# Patient Record
Sex: MALE | Race: WHITE | Employment: UNEMPLOYED | URBAN - METROPOLITAN AREA
[De-identification: names, ages, dates, MRNs, and addresses within clinical notes are randomized per-mention and may not be internally consistent; named-entity substitution may affect disease eponyms.]

---

## 2017-03-09 ENCOUNTER — OFFICE VISIT (OUTPATIENT)
Dept: CARDIOLOGY CLINIC | Age: 22
End: 2017-03-09

## 2017-03-09 VITALS
BODY MASS INDEX: 46.65 KG/M2 | HEART RATE: 64 BPM | HEIGHT: 69 IN | SYSTOLIC BLOOD PRESSURE: 119 MMHG | WEIGHT: 315 LBS | DIASTOLIC BLOOD PRESSURE: 57 MMHG

## 2017-03-09 DIAGNOSIS — I42.9 CARDIOMYOPATHY (HCC): ICD-10-CM

## 2017-03-09 DIAGNOSIS — R06.02 SHORTNESS OF BREATH: ICD-10-CM

## 2017-03-09 DIAGNOSIS — I10 ESSENTIAL HYPERTENSION WITH GOAL BLOOD PRESSURE LESS THAN 140/90: Primary | ICD-10-CM

## 2017-03-09 DIAGNOSIS — E66.9 OBESITY, UNSPECIFIED OBESITY SEVERITY, UNSPECIFIED OBESITY TYPE: ICD-10-CM

## 2017-03-09 RX ORDER — GEMFIBROZIL 600 MG/1
600 TABLET, FILM COATED ORAL 2 TIMES DAILY
COMMUNITY
End: 2018-02-05 | Stop reason: SDUPTHER

## 2017-03-09 RX ORDER — VITAMIN E 268 MG
CAPSULE ORAL DAILY
COMMUNITY
End: 2017-07-18 | Stop reason: SDUPTHER

## 2017-03-09 RX ORDER — LISINOPRIL 2.5 MG/1
2.5 TABLET ORAL DAILY
Qty: 30 TAB | Refills: 6 | Status: SHIPPED | OUTPATIENT
Start: 2017-03-09 | End: 2017-07-18 | Stop reason: SDUPTHER

## 2017-03-09 RX ORDER — ASPIRIN 81 MG/1
TABLET ORAL DAILY
COMMUNITY
End: 2018-03-08

## 2017-03-09 NOTE — LETTER
Migel Hector 1995 
 
3/9/2017 Dear Enrique Moreno MD 
 
I had the pleasure of evaluating  Mr. Shelbie De Santiago in office today. Below are the relevant portions of my assessment and plan of care. ICD-10-CM ICD-9-CM 1. Essential hypertension with goal blood pressure less than 140/90 I10 401.9   
 controlled 2. Cardiomyopathy ([address] I42.9 425.4   
 improved 3. Shortness of breath R06.02 786.05   
 exertional 
stable 4. Obesity, unspecified obesity severity, unspecified obesity type E66.9 278.00   
 discussed  diet Current Outpatient Prescriptions Medication Sig Dispense Refill  ERGOCALCIFEROL, VITAMIN D2, (VITAMIN D2 PO) Take  by mouth.  vitamin E (AQUA GEMS) 400 unit capsule Take  by mouth daily.  gemfibrozil (LOPID) 600 mg tablet Take 600 mg by mouth two (2) times a day.  aspirin delayed-release 81 mg tablet Take  by mouth daily.  lisinopril (PRINIVIL, ZESTRIL) 2.5 mg tablet Take 1 Tab by mouth daily. 30 Tab 6  
 sodium chloride (SALINE NASAL) 0.65 % nasal spray 1 [place] by Both Nostrils route as needed for Congestion. 30 mL 0  
 carvedilol (COREG) 6.25 mg tablet Take 1 Tab by mouth two (2) times daily (with meals). 60 Tab 6  
 albuterol (VENTOLIN HFA) 90 mcg/actuation inhaler Take  by inhalation.  fexofenadine (ALLEGRA ALLERGY) 60 mg tablet Take 1 Tab by mouth daily. 30 Tab 0  
 naproxen (NAPROSYN) 500 mg tablet Take 500 mg by mouth two (2) times daily (with meals). Orders Placed This Encounter  ERGOCALCIFEROL, VITAMIN D2, (VITAMIN D2 PO) Sig: Take  by mouth.  vitamin E (AQUA GEMS) 400 unit capsule Sig: Take  by mouth daily.  gemfibrozil (LOPID) 600 mg tablet Sig: Take 600 mg by mouth two (2) times a day.  aspirin delayed-release 81 mg tablet Sig: Take  by mouth daily.  lisinopril (PRINIVIL, ZESTRIL) 2.5 mg tablet Sig: Take 1 Tab by mouth daily. Dispense:  30 Tab Refill:  6 If you have questions, please do not hesitate to call me. I look forward to following Mr. Londono Jess along with you. Sincerely, Judd Bourne MD

## 2017-03-09 NOTE — PROGRESS NOTES
1. Have you been to the ER, urgent care clinic since your last visit? Hospitalized since your last visit? No    2. Have you seen or consulted any other health care providers outside of the [address] [place] [address] since your last visit? Include any pap smears or colon screening. No     3. Since your last visit, have you had any of the following symptoms? None    4. Have you had any blood work, X-rays or cardiac testing? None    5. Where do you normally have your labs drawn? [address]    6. Do you need any refills today?   yes

## 2017-03-09 NOTE — PROGRESS NOTES
HISTORY OF PRESENT ILLNESS  Bhavna Sarabia is a 24 y.o. male. Hypertension   The history is provided by the patient. This is a chronic problem. The problem has not changed since onset. Associated symptoms include shortness of breath. Pertinent negatives include no chest pain. Shortness of Breath   The history is provided by the patient. This is a recurrent problem. The problem occurs intermittently. The problem has been rapidly improving. Pertinent negatives include no fever, no cough, no sputum production, no hemoptysis, no wheezing, no PND, no orthopnea, no chest pain, no vomiting, no rash, no leg swelling and no claudication. Review of Systems   Constitutional: Negative for chills and fever. HENT: Negative for nosebleeds. Eyes: Negative for blurred vision and double vision. Respiratory: Positive for shortness of breath. Negative for cough, hemoptysis, sputum production and wheezing. Cardiovascular: Negative for chest pain, palpitations, orthopnea, claudication, leg swelling and PND. Gastrointestinal: Negative for heartburn, nausea and vomiting. Musculoskeletal: Negative for myalgias. Skin: Negative for rash. Neurological: Negative for dizziness and weakness. Endo/Heme/Allergies: Does not bruise/bleed easily. Family History   Problem Relation Age of Onset    Heart Attack Neg Hx     Heart Surgery Neg Hx        Past Medical History:   Diagnosis Date    Asthma 5/5/2014    possible asthma r/o cardiac etiology h/o chest trauma as a child     Other specified cardiac dysrhythmias(427.89) 5/5/2014    marked sinus bradycardia     Palpitations 5/5/2014    r/o arrythmia     Shortness of breath 5/5/2014    possible asthma r/o cardiac etiology h/o chest trauma as a child        No past surgical history on file.     Social History   Substance Use Topics    Smoking status: Never Smoker    Smokeless tobacco: Never Used    Alcohol use No       Allergies   Allergen Reactions    Penicillins Unable to Obtain       Outpatient Prescriptions Marked as Taking for the 3/9/17 encounter (Office Visit) with Tani Angel MD   Medication Sig Dispense Refill    ERGOCALCIFEROL, VITAMIN D2, (VITAMIN D2 PO) Take  by mouth.  vitamin E (AQUA GEMS) 400 unit capsule Take  by mouth daily.  gemfibrozil (LOPID) 600 mg tablet Take 600 mg by mouth two (2) times a day.  aspirin delayed-release 81 mg tablet Take  by mouth daily.  lisinopril (PRINIVIL, ZESTRIL) 2.5 mg tablet Take 1 Tab by mouth daily. 30 Tab 6    sodium chloride (SALINE NASAL) 0.65 % nasal spray 1 [place] by Both Nostrils route as needed for Congestion. 30 mL 0    carvedilol (COREG) 6.25 mg tablet Take 1 Tab by mouth two (2) times daily (with meals). 60 Tab 6    albuterol (VENTOLIN HFA) 90 mcg/actuation inhaler Take  by inhalation. Visit Vitals    /57 (BP 1 Location: Left arm, BP Patient Position: At rest)    Pulse 64    Ht 5' 9\" (1.753 m)    Wt 155.1 kg (342 lb)    BMI 50.5 kg/m2         Physical Exam   Constitutional: He is oriented to person, place, and time. He appears well-developed and well-nourished. HENT:   Head: Normocephalic and atraumatic. Eyes: Conjunctivae are normal.   Neck: Neck supple. No JVD present. No tracheal deviation present. No thyromegaly present. Cardiovascular: Normal rate, regular rhythm and normal heart sounds. Exam reveals no gallop and no friction rub. No murmur heard. Pulmonary/Chest: Breath sounds normal. No respiratory distress. He has no wheezes. He has no rales. He exhibits no tenderness. Abdominal: Soft. There is no tenderness. Musculoskeletal: He exhibits no edema. Neurological: He is alert and oriented to person, place, and time. Skin: Skin is warm and dry. Psychiatric: He has a normal mood and affect. Mr. Eliceo Horton has a reminder for a \"due or due soon\" health maintenance.  I have asked that he contact his primary care provider for follow-up on this health maintenance. No flowsheet data found. I have personally reviewed patient's records available from hospital and other providers and incorporated findings in patient care. SUMMARY:echo:12/2015  Left ventricle: Systolic function was normal. Ejection fraction was  estimated to be 55 %. There were no regional wall motion abnormalities. Left ventricular diastolic function parameters were normal.    COMPARISONS:  Comparison was made with the previous study of 02-Jun-2015. LV overall  function has increased from 45 % to 55 %    Assessment         ICD-10-CM ICD-9-CM    1. Essential hypertension with goal blood pressure less than 140/90 I10 401.9     controlled   2. Cardiomyopathy ([facility] [address] [facility] I42.9 425.4     improved   3. Shortness of breath R06.02 786.05     exertional  stable   4. Obesity, unspecified obesity severity, unspecified obesity type E66.9 278.00     discussed  diet       Medications Discontinued During This Encounter   Medication Reason    lisinopril (PRINIVIL, ZESTRIL) 2.5 mg tablet Reorder       Orders Placed This Encounter    lisinopril (PRINIVIL, ZESTRIL) 2.5 mg tablet     Sig: Take 1 Tab by mouth daily. Dispense:  30 Tab     Refill:  6       Follow-up Disposition:  Return in about 6 months (around 9/9/2017).

## 2017-03-09 NOTE — MR AVS SNAPSHOT
Visit Information Date & Time Provider Department Dept. Phone Encounter #  
 3/9/2017  9:30 AM Graham Guo MD [facility] [address] [place] [address] [id number] Follow-up Instructions Return in about 6 months (around 9/9/2017). Your Appointments 9/7/2017 10:00 AM  
ESTABLISHED PATIENT with Graham Guo MD  
[facility]) Appt Note: 6 months [address] [facility], [address] [facility] [address] [address] [facility], [address] [place] [address] Upcoming Health Maintenance Date Due Pneumococcal 19-64 Medium Risk (1 of 1 - PPSV23) 4/4/2013 DTaP/Tdap/Td series (1 - Tdap) 4/4/2015 INFLUENZA AGE 9 TO ADULT 8/1/2016 Allergies as of 3/9/2017  Review Complete On: 3/9/2017 By: Graham Guo MD  
  
 Severity Noted Reaction Type Reactions Penicillins High 05/05/2014    Unable to Obtain Current Immunizations  Never Reviewed No immunizations on file. Not reviewed this visit You Were Diagnosed With   
  
 Codes Comments Essential hypertension with goal blood pressure less than 140/90    -  Primary ICD-10-CM: I10 
ICD-9-CM: 401.9 controlled Cardiomyopathy ([facility] [address]     ICD-10-CM: I42.9 ICD-9-CM: 425.4 improved Shortness of breath     ICD-10-CM: R06.02 
ICD-9-CM: 786.05 exertional 
stable Obesity, unspecified obesity severity, unspecified obesity type     ICD-10-CM: E66.9 ICD-9-CM: 278.00 discussed  diet Vitals BP Pulse Height(growth percentile) Weight(growth percentile) BMI Smoking Status 119/57 (BP 1 Location: Left arm, BP Patient Position: At rest) 64 5' 9\" (1.753 m) 342 lb (155.1 kg) 50.5 kg/m2 Never Smoker Vitals History BMI and BSA Data Body Mass Index Body Surface Area 50.5 kg/m 2 2.75 m 2 Preferred Pharmacy Pharmacy Name Phone [address], [address] [place] [address] [phone] Your Updated Medication List  
  
   
This list is accurate as of: 3/9/17 10:25 AM.  Always use your most recent med list.  
  
  
  
  
 aspirin delayed-release 81 mg tablet Take  by mouth daily. carvedilol 6.25 mg tablet Commonly known as:  Ozie Bitter Take 1 Tab by mouth two (2) times daily (with meals). fexofenadine 60 mg tablet Commonly known as:  ALLEGRA ALLERGY Take 1 Tab by mouth daily. lisinopril 2.5 mg tablet Commonly known as:  Marilynne Shows Take 1 Tab by mouth daily. LOPID 600 mg tablet Generic drug:  gemfibrozil Take 600 mg by mouth two (2) times a day. naproxen 500 mg tablet Commonly known as:  NAPROSYN Take 500 mg by mouth two (2) times daily (with meals). sodium chloride 0.65 % nasal spray Commonly known as:  SALINE NASAL  
1 [place] by Both Nostrils route as needed for Congestion. VENTOLIN HFA 90 mcg/actuation inhaler Generic drug:  albuterol Take  by inhalation. VITAMIN D2 PO Take  by mouth.  
  
 vitamin E 400 unit capsule Commonly known as:  [address] Take  by mouth daily. Prescriptions Sent to Pharmacy Refills  
 lisinopril (PRINIVIL, ZESTRIL) 2.5 mg tablet 6 Sig: Take 1 Tab by mouth daily. Class: Normal  
 Pharmacy: [facility], [address] [place] [address] #: [phone] Route: Oral  
  
Follow-up Instructions Return in about 6 months (around 9/9/2017). Introducing [facility]! [employer] introduces [employer] patient portal. Now you can access parts of your medical record, email your doctor's office, and request medication refills online. 1. In your internet browser, go to [email or web address] 2. Click on the First Time User? Click Here link in the Sign In box. You will see the New Member Sign Up page. 3. Enter your [employer] Access Code exactly as it appears below.  You will not need to use this code after youve completed the sign-up process. If you do not sign up before the expiration date, you must request a new code. · [employer] Access Code: [email or web address] Expires: 6/7/2017  9:44 AM 
 
4. Enter the last four digits of your Social Security Number (xxxx) and Date of Birth (mm/dd/yyyy) as indicated and click Submit. You will be taken to the next sign-up page. 5. Create a [employer] ID. This will be your [employer] login ID and cannot be changed, so think of one that is secure and easy to remember. 6. Create a [employer] password. You can change your password at any time. 7. Enter your Password Reset Question and Answer. This can be used at a later time if you forget your password. 8. Enter your e-mail address. You will receive e-mail notification when new information is available in [address] 9. Click Sign Up. You can now view and download portions of your medical record. 10. Click the Download Summary menu link to download a portable copy of your medical information. If you have questions, please visit the Frequently Asked Questions section of the [employer] website. Remember, [employer] is NOT to be used for urgent needs. For medical emergencies, dial 911. Now available from your iPhone and Android! Please provide this summary of care documentation to your next provider. Your primary care clinician is listed as Tia Chris. If you have any questions after today's visit, please call [phone].

## 2017-04-20 ENCOUNTER — OFFICE VISIT (OUTPATIENT)
Dept: FAMILY MEDICINE CLINIC | Facility: CLINIC | Age: 22
End: 2017-04-20

## 2017-04-20 VITALS
OXYGEN SATURATION: 97 % | TEMPERATURE: 98.9 F | WEIGHT: 315 LBS | DIASTOLIC BLOOD PRESSURE: 64 MMHG | HEART RATE: 58 BPM | SYSTOLIC BLOOD PRESSURE: 134 MMHG | HEIGHT: 68 IN | RESPIRATION RATE: 18 BRPM | BODY MASS INDEX: 47.74 KG/M2

## 2017-04-20 DIAGNOSIS — E66.01 MORBID OBESITY DUE TO EXCESS CALORIES (HCC): ICD-10-CM

## 2017-04-20 DIAGNOSIS — I10 ESSENTIAL HYPERTENSION WITH GOAL BLOOD PRESSURE LESS THAN 140/90: ICD-10-CM

## 2017-04-20 DIAGNOSIS — J45.40 MODERATE PERSISTENT ASTHMA WITHOUT COMPLICATION: ICD-10-CM

## 2017-04-20 DIAGNOSIS — M79.645 THUMB PAIN, LEFT: Primary | ICD-10-CM

## 2017-04-20 DIAGNOSIS — R00.1 BRADYCARDIA: ICD-10-CM

## 2017-04-20 RX ORDER — ERGOCALCIFEROL 1.25 MG/1
CAPSULE ORAL
Refills: 0 | COMMUNITY
Start: 2017-03-13 | End: 2017-07-18 | Stop reason: SDUPTHER

## 2017-04-20 NOTE — MR AVS SNAPSHOT
Visit Information Date & Time Provider Department Dept. Phone Encounter #  
 4/20/2017 10:00 AM Dayna Cannon MD [facility] [phone] [id number] Follow-up Instructions Return in about 3 months (around 7/20/2017), or if symptoms worsen or fail to improve, for HTN, Asthma, Obesity. Your Appointments 9/7/2017 10:00 AM  
ESTABLISHED PATIENT with Danny Riley MD  
[facility] Pending sale to [facility]) Appt Note: 6 months [address], [address] [facility] [address]  
[address], [address] Upcoming Health Maintenance Date Due Pneumococcal 19-64 Medium Risk (1 of 1 - PPSV23) 4/4/2013 DTaP/Tdap/Td series (1 - Tdap) 4/4/2015 INFLUENZA AGE 9 TO ADULT 8/1/2016 Allergies as of 4/20/2017  Review Complete On: 4/20/2017 By: Jessi Syed Severity Noted Reaction Type Reactions Penicillins High 05/05/2014    Unable to Obtain Current Immunizations  Never Reviewed No immunizations on file. Not reviewed this visit You Were Diagnosed With   
  
 Codes Comments Thumb pain, left    -  Primary ICD-10-CM: W46.600 ICD-9-CM: 729.5 Moderate persistent asthma without complication     OMT-92-GR: J45.40 ICD-9-CM: 493.90 Essential hypertension with goal blood pressure less than 140/90     ICD-10-CM: I10 
ICD-9-CM: 401.9 Vitals BP Pulse Temp Resp Height(growth percentile) Weight(growth percentile) 134/64 (!) 48 98.9 °F (37.2 °C) 18 5' 8\" (1.727 m) 348 lb (157.9 kg) SpO2 BMI Smoking Status 97% 52.91 kg/m2 Never Smoker Vitals History BMI and BSA Data Body Mass Index Body Surface Area 52.91 kg/m 2 2.75 m 2 Preferred Pharmacy Pharmacy Name Phone [address], [address] [place] [address] [phone] Your Updated Medication List  
  
   
 This list is accurate as of: 4/20/17 10:33 AM.  Always use your most recent med list.  
  
  
  
  
 aspirin delayed-release 81 mg tablet Take  by mouth daily. carvedilol 6.25 mg tablet Commonly known as:  Casandra Margret Take 1 Tab by mouth two (2) times daily (with meals). fexofenadine 60 mg tablet Commonly known as:  ALLEGRA ALLERGY Take 1 Tab by mouth daily. lisinopril 2.5 mg tablet Commonly known as:  Nelli Peraza Take 1 Tab by mouth daily. LOPID 600 mg tablet Generic drug:  gemfibrozil Take 600 mg by mouth two (2) times a day. naproxen 500 mg tablet Commonly known as:  NAPROSYN Take 500 mg by mouth two (2) times daily (with meals). sodium chloride 0.65 % nasal spray Commonly known as:  SALINE NASAL  
1 [place] by Both Nostrils route as needed for Congestion. SPIRIVA RESPIMAT 1.25 mcg/actuation inhaler Generic drug:  tiotropium bromide Take 2 Puffs by inhalation daily. VENTOLIN HFA 90 mcg/actuation inhaler Generic drug:  albuterol Take  by inhalation. * VITAMIN D2 PO Take 1.25 mg by mouth. * VITAMIN D2 50,000 unit capsule Generic drug:  ergocalciferol  
take 1 capsule by mouth every week  
  
 vitamin E 400 unit capsule Commonly known as:  [address] Take  by mouth daily. * Notice: This list has 2 medication(s) that are the same as other medications prescribed for you. Read the directions carefully, and ask your doctor or other care provider to review them with you. Follow-up Instructions Return in about 3 months (around 7/20/2017), or if symptoms worsen or fail to improve, for HTN, Asthma, Obesity. To-Do List   
 04/20/2017 Imaging:  XR HAND LT MIN 3 V Patient Instructions Hand Pain: Care Instructions Your Care Instructions Common causes of hand pain are overuse and injuries, such as might happen during sports or home repair projects.  Everyday wear and tear, especially as you get older, also can cause hand pain. Most minor hand injuries will heal on their own, and home treatment is usually all you need to do. If you have sudden and severe pain, you may need tests and treatment. Follow-up care is a key part of your treatment and safety. Be sure to make and go to all appointments, and call your doctor if you are having problems. Its also a good idea to know your test results and keep a list of the medicines you take. How can you care for yourself at home? · Take pain medicines exactly as directed. ¨ If the doctor gave you a prescription medicine for pain, take it as prescribed. ¨ If you are not taking a prescription pain medicine, ask your doctor if you can take an over-the-counter medicine. · Rest and protect your hand. Take a break from any activity that may cause pain. · Put ice or a cold pack on your hand for 10 to 20 minutes at a time. Put a thin cloth between the ice and your skin. · Prop up the sore hand on a pillow when you ice it or anytime you sit or lie down during the next 3 days. Try to keep it above the level of your heart. This will help reduce swelling. · If your doctor recommends a sling, splint, or elastic bandage to support your hand, wear it as directed. When should you call for help? Call 911 anytime you think you may need emergency care. For example, call if: 
· Your hand turns cool or pale or changes color. Call your doctor now or seek immediate medical care if: 
· You cannot move your hand. · Your hand pops, moves out of its normal position, and then returns to its normal position. · You have signs of infection, such as: 
¨ Increased pain, swelling, warmth, or redness. ¨ Red streaks leading from the sore area. ¨ Pus draining from a place on your hand. ¨ A fever. · Your hand feels numb or tingly. Watch closely for changes in your health, and be sure to contact your doctor if: 
· Your hand feels unstable when you try to use it. · You do not get better as expected. · You have any new symptoms, such as swelling. · Bruises from an injury to your hand last longer than 2 weeks. Where can you learn more? Go to http://leandra-giovanni.info/. Enter R273 in the search box to learn more about \"Hand Pain: Care Instructions. \" Current as of: May 27, 2016 Content Version: 11.2 © 8589-8764 [employer]. Care instructions adapted under license by [employer] (which disclaims liability or warranty for this information). If you have questions about a medical condition or this instruction, always ask your healthcare professional. [address] any warranty or liability for your use of this information. Low Sodium Diet (2,000 Milligram): Care Instructions Your Care Instructions Too much sodium causes your body to hold on to extra water. This can raise your blood pressure and force your heart and kidneys to work harder. In very serious cases, this could cause you to be put in the hospital. It might even be life-threatening. By limiting sodium, you will feel better and lower your risk of serious problems. The most common source of sodium is salt. People get most of the salt in their diet from canned, prepared, and packaged foods. Fast food and restaurant meals also are very high in sodium. Your doctor will probably limit your sodium to less than 2,000 milligrams (mg) a day. This limit counts all the sodium in prepared and packaged foods and any salt you add to your food. Follow-up care is a key part of your treatment and safety. Be sure to make and go to all appointments, and call your doctor if you are having problems. It's also a good idea to know your test results and keep a list of the medicines you take. How can you care for yourself at home? Read food labels · Read labels on cans and food packages.  The labels tell you how much sodium is in each serving. Make sure that you look at the serving size. If you eat more than the serving size, you have eaten more sodium. · Food labels also tell you the Percent Daily Value for sodium. Choose products with low Percent Daily Values for sodium. · Be aware that sodium can come in forms other than salt, including monosodium glutamate (MSG), sodium citrate, and sodium bicarbonate (baking soda). MSG is often added to Asian food. When you eat out, you can sometimes ask for food without MSG or added salt. Buy low-sodium foods · Buy foods that are labeled \"unsalted\" (no salt added), \"sodium-free\" (less than 5 mg of sodium per serving), or \"low-sodium\" (less than 140 mg of sodium per serving). Foods labeled \"reduced-sodium\" and \"light sodium\" may still have too much sodium. Be sure to read the label to see how much sodium you are getting. · Buy fresh vegetables, or frozen vegetables without added sauces. Buy low-sodium versions of canned vegetables, soups, and other canned goods. Prepare low-sodium meals · Cut back on the amount of salt you use in cooking. This will help you adjust to the taste. Do not add salt after cooking. One teaspoon of salt has about 2,300 mg of sodium. · Take the salt shaker off the table. · Flavor your food with garlic, lemon juice, onion, vinegar, herbs, and spices. Do not use soy sauce, lite soy sauce, steak sauce, onion salt, garlic salt, celery salt, mustard, or ketchup on your food. · Use low-sodium salad dressings, sauces, and ketchup. Or make your own salad dressings and sauces without adding salt. · Use less salt (or none) when recipes call for it. You can often use half the salt a recipe calls for without losing flavor. Other foods such as rice, pasta, and grains do not need added salt. · Rinse canned vegetables, and cook them in fresh water. This removes somebut not allof the salt.  
· Avoid water that is naturally high in sodium or that has been treated with water softeners, which add sodium. Call your local water company to find out the sodium content of your water supply. If you buy bottled water, read the label and choose a sodium-free brand. Avoid high-sodium foods · Avoid eating: ¨ Smoked, cured, salted, and canned meat, fish, and poultry. ¨ Ham, ortega, hot dogs, and luncheon meats. ¨ Regular, hard, and processed cheese and regular peanut butter. ¨ Crackers with salted tops, and other salted snack foods such as pretzels, chips, and salted popcorn. ¨ Frozen prepared meals, unless labeled low-sodium. ¨ Canned and dried soups, broths, and bouillon, unless labeled sodium-free or low-sodium. ¨ Canned vegetables, unless labeled sodium-free or low-sodium. ¨ Western [place] fries, pizza, tacos, and other fast foods. ¨ Pickles, olives, ketchup, and other condiments, especially soy sauce, unless labeled sodium-free or low-sodium. Where can you learn more? Go to http://leandraReDigigiovanni.info/. Enter O669 in the search box to learn more about \"Low Sodium Diet (2,000 Milligram): Care Instructions. \" Current as of: July 26, 2016 Content Version: 11.2 © 4384-3267 [employer]. Care instructions adapted under license by [employer] (which disclaims liability or warranty for this information). If you have questions about a medical condition or this instruction, always ask your healthcare professional. [place] [address] any warranty or liability for your use of this information. Learning About Asthma Triggers What are asthma triggers? When you have asthma, certain things can make your symptoms worse. These are called triggers. Learn what triggers an asthma attack for you, and avoid the triggers when you can. Common triggers include colds, smoke, air pollution, dust, pollen, pets, stress, and cold air. How do asthma triggers affect you? Triggers can make it harder for your lungs to work as they should.  They can lead to sudden breathing problems and other symptoms. When you are around a trigger, an asthma attack is more likely. If your symptoms are severe, you may need emergency treatment or have to go to the hospital for treatment. What can you do to avoid triggers? The first thing is to know your triggers. When you are having symptoms, note the things around you that might be causing them. Then look for patterns that may be triggering your symptoms. Record your triggers on a piece of paper or in an asthma diary. When you have your list of possible triggers, work with your doctor to find ways to avoid them. Avoid colds and flu. Get a pneumococcal vaccine shot. If you have had one before, ask your doctor whether you need a second dose. Get a flu vaccine every year, as soon as it's available. If you must be around people with colds or the flu, wash your hands often. Here are some ways to avoid a few common triggers. · Do not smoke or allow others to smoke around you. If you need help quitting, talk to your doctor about stop-smoking programs and medicines. These can increase your chances of quitting for good. · If there is a lot of pollution, pollen, or dust outside, stay at home and keep your windows closed. Use an air conditioner or air filter in your home. Check your local weather report or newspaper for air quality and pollen reports. What else should you know? · Take your controller medicine every day, not just when you have symptoms. It helps prevent problems before they occur. · Your doctor may suggest that you check how well your lungs are working by measuring your peak expiratory flow (PEF) throughout the day. Your PEF may drop when you are near things that trigger symptoms. Where can you learn more? Go to http://leandra-giovanni.info/. Enter L880 in the search box to learn more about \"Learning About Asthma Triggers. \" Current as of: May 23, 2016 Content Version: 11.2 © 8273-5224 [employer]. Care instructions adapted under license by [employer] (which disclaims liability or warranty for this information). If you have questions about a medical condition or this instruction, always ask your healthcare professional. [address] any warranty or liability for your use of this information. Introducing [facility]! Christianoashley Reed introduces [employer] patient portal. Now you can access parts of your medical record, email your doctor's office, and request medication refills online. 1. In your internet browser, go to [email or web address] 2. Click on the First Time User? Click Here link in the Sign In box. You will see the New Member Sign Up page. 3. Enter your [employer] Access Code exactly as it appears below. You will not need to use this code after youve completed the sign-up process. If you do not sign up before the expiration date, you must request a new code. · [employer] Access Code: [email or web address] Expires: 6/7/2017 10:44 AM 
 
4. Enter the last four digits of your Social Security Number (xxxx) and Date of Birth (mm/dd/yyyy) as indicated and click Submit. You will be taken to the next sign-up page. 5. Create a M[employer] ID. This will be your [employer] login ID and cannot be changed, so think of one that is secure and easy to remember. 6. Create a [employer] password. You can change your password at any time. 7. Enter your Password Reset Question and Answer. This can be used at a later time if you forget your password. 8. Enter your e-mail address. You will receive e-mail notification when new information is available in [address] 9. Click Sign Up. You can now view and download portions of your medical record. 10. Click the Download Summary menu link to download a portable copy of your medical information.  
 
If you have questions, please visit the Frequently Asked Questions section of the [employer]. Remember, [employer] is NOT to be used for urgent needs. For medical emergencies, dial 911. Now available from your iPhone and Android! Please provide this summary of care documentation to your next provider. Your primary care clinician is listed as Clayton December. If you have any questions after today's visit, please call [phone].

## 2017-04-20 NOTE — PATIENT INSTRUCTIONS
Hand Pain: Care Instructions  Your Care Instructions  Common causes of hand pain are overuse and injuries, such as might happen during sports or home repair projects. Everyday wear and tear, especially as you get older, also can cause hand pain. Most minor hand injuries will heal on their own, and home treatment is usually all you need to do. If you have sudden and severe pain, you may need tests and treatment. Follow-up care is a key part of your treatment and safety. Be sure to make and go to all appointments, and call your doctor if you are having problems. Its also a good idea to know your test results and keep a list of the medicines you take. How can you care for yourself at home? · Take pain medicines exactly as directed. ¨ If the doctor gave you a prescription medicine for pain, take it as prescribed. ¨ If you are not taking a prescription pain medicine, ask your doctor if you can take an over-the-counter medicine. · Rest and protect your hand. Take a break from any activity that may cause pain. · Put ice or a cold pack on your hand for 10 to 20 minutes at a time. Put a thin cloth between the ice and your skin. · Prop up the sore hand on a pillow when you ice it or anytime you sit or lie down during the next 3 days. Try to keep it above the level of your heart. This will help reduce swelling. · If your doctor recommends a sling, splint, or elastic bandage to support your hand, wear it as directed. When should you call for help? Call 911 anytime you think you may need emergency care. For example, call if:  · Your hand turns cool or pale or changes color. Call your doctor now or seek immediate medical care if:  · You cannot move your hand. · Your hand pops, moves out of its normal position, and then returns to its normal position. · You have signs of infection, such as:  ¨ Increased pain, swelling, warmth, or redness. ¨ Red streaks leading from the sore area.   ¨ Pus draining from a place on your hand. ¨ A fever. · Your hand feels numb or tingly. Watch closely for changes in your health, and be sure to contact your doctor if:  · Your hand feels unstable when you try to use it. · You do not get better as expected. · You have any new symptoms, such as swelling. · Bruises from an injury to your hand last longer than 2 weeks. Where can you learn more? Go to http://leandra-giovanni.info/. Enter R273 in the search box to learn more about \"Hand Pain: Care Instructions. \"  Current as of: May 27, 2016  Content Version: 11.2  © 2222-6777 [employer]. Care instructions adapted under license by [employer] (which disclaims liability or warranty for this information). If you have questions about a medical condition or this instruction, always ask your healthcare professional. [address] any warranty or liability for your use of this information. Low Sodium Diet (2,000 Milligram): Care Instructions  Your Care Instructions  Too much sodium causes your body to hold on to extra water. This can raise your blood pressure and force your heart and kidneys to work harder. In very serious cases, this could cause you to be put in the hospital. It might even be life-threatening. By limiting sodium, you will feel better and lower your risk of serious problems. The most common source of sodium is salt. People get most of the salt in their diet from canned, prepared, and packaged foods. Fast food and restaurant meals also are very high in sodium. Your doctor will probably limit your sodium to less than 2,000 milligrams (mg) a day. This limit counts all the sodium in prepared and packaged foods and any salt you add to your food. Follow-up care is a key part of your treatment and safety. Be sure to make and go to all appointments, and call your doctor if you are having problems.  It's also a good idea to know your test results and keep a list of the medicines you take. How can you care for yourself at home? Read food labels  · Read labels on cans and food packages. The labels tell you how much sodium is in each serving. Make sure that you look at the serving size. If you eat more than the serving size, you have eaten more sodium. · Food labels also tell you the Percent Daily Value for sodium. Choose products with low Percent Daily Values for sodium. · Be aware that sodium can come in forms other than salt, including monosodium glutamate (MSG), sodium citrate, and sodium bicarbonate (baking soda). MSG is often added to Asian food. When you eat out, you can sometimes ask for food without MSG or added salt. Buy low-sodium foods  · Buy foods that are labeled \"unsalted\" (no salt added), \"sodium-free\" (less than 5 mg of sodium per serving), or \"low-sodium\" (less than 140 mg of sodium per serving). Foods labeled \"reduced-sodium\" and \"light sodium\" may still have too much sodium. Be sure to read the label to see how much sodium you are getting. · Buy fresh vegetables, or frozen vegetables without added sauces. Buy low-sodium versions of canned vegetables, soups, and other canned goods. Prepare low-sodium meals  · Cut back on the amount of salt you use in cooking. This will help you adjust to the taste. Do not add salt after cooking. One teaspoon of salt has about 2,300 mg of sodium. · Take the salt shaker off the table. · Flavor your food with garlic, lemon juice, onion, vinegar, herbs, and spices. Do not use soy sauce, lite soy sauce, steak sauce, onion salt, garlic salt, celery salt, mustard, or ketchup on your food. · Use low-sodium salad dressings, sauces, and ketchup. Or make your own salad dressings and sauces without adding salt. · Use less salt (or none) when recipes call for it. You can often use half the salt a recipe calls for without losing flavor. Other foods such as rice, pasta, and grains do not need added salt.   · Rinse canned vegetables, and cook them in fresh water. This removes somebut not allof the salt. · Avoid water that is naturally high in sodium or that has been treated with water softeners, which add sodium. Call your local water company to find out the sodium content of your water supply. If you buy bottled water, read the label and choose a sodium-free brand. Avoid high-sodium foods  · Avoid eating:  ¨ Smoked, cured, salted, and canned meat, fish, and poultry. ¨ Ham, ortega, hot dogs, and luncheon meats. ¨ Regular, hard, and processed cheese and regular peanut butter. ¨ Crackers with salted tops, and other salted snack foods such as pretzels, chips, and salted popcorn. ¨ Frozen prepared meals, unless labeled low-sodium. ¨ Canned and dried soups, broths, and bouillon, unless labeled sodium-free or low-sodium. ¨ Canned vegetables, unless labeled sodium-free or low-sodium. ¨ [place] [employer] fries, pizza, tacos, and other fast foods. ¨ Pickles, olives, ketchup, and other condiments, especially soy sauce, unless labeled sodium-free or low-sodium. Where can you learn more? Go to http://leandra-giovanni.info/. Enter J704 in the search box to learn more about \"Low Sodium Diet (2,000 Milligram): Care Instructions. \"  Current as of: July 26, 2016  Content Version: 11.2  © 2378-5958 [employer]. Care instructions adapted under license by [employer] (which disclaims liability or warranty for this information). If you have questions about a medical condition or this instruction, always ask your healthcare professional. [place] [address] any warranty or liability for your use of this information. Learning About Asthma Triggers  What are asthma triggers? When you have asthma, certain things can make your symptoms worse. These are called triggers. Learn what triggers an asthma attack for you, and avoid the triggers when you can.  Common triggers include colds, smoke, air pollution, dust, pollen, pets, stress, and cold air. How do asthma triggers affect you? Triggers can make it harder for your lungs to work as they should. They can lead to sudden breathing problems and other symptoms. When you are around a trigger, an asthma attack is more likely. If your symptoms are severe, you may need emergency treatment or have to go to the hospital for treatment. What can you do to avoid triggers? The first thing is to know your triggers. When you are having symptoms, note the things around you that might be causing them. Then look for patterns that may be triggering your symptoms. Record your triggers on a piece of paper or in an asthma diary. When you have your list of possible triggers, work with your doctor to find ways to avoid them. Avoid colds and flu. Get a pneumococcal vaccine shot. If you have had one before, ask your doctor whether you need a second dose. Get a flu vaccine every year, as soon as it's available. If you must be around people with colds or the flu, wash your hands often. Here are some ways to avoid a few common triggers. · Do not smoke or allow others to smoke around you. If you need help quitting, talk to your doctor about stop-smoking programs and medicines. These can increase your chances of quitting for good. · If there is a lot of pollution, pollen, or dust outside, stay at home and keep your windows closed. Use an air conditioner or air filter in your home. Check your local weather report or newspaper for air quality and pollen reports. What else should you know? · Take your controller medicine every day, not just when you have symptoms. It helps prevent problems before they occur. · Your doctor may suggest that you check how well your lungs are working by measuring your peak expiratory flow (PEF) throughout the day. Your PEF may drop when you are near things that trigger symptoms. Where can you learn more? Go to http://leandra-giovanni.info/.   Enter N785 in the search box to learn more about \"Learning About Asthma Triggers. \"  Current as of: May 23, 2016  Content Version: 11.2  © 6210-7030 [employer]. Care instructions adapted under license by [employer] (which disclaims liability or warranty for this information). If you have questions about a medical condition or this instruction, always ask your healthcare professional. [place] [address] any warranty or liability for your use of this information.

## 2017-04-20 NOTE — PROGRESS NOTES
Chief Complaint   Patient presents with    Establish Care    Hand Pain     left thumb     HPI:  New patient here to establish care  Previous patient of Lillie Smith for cardiomyopathy  HTN: stable on lisinopril and Coreg without complaints of lightheadness, dizziness, fatigue, headache, blurry vision or focal deficits. Morbid Obesity: planning to start exercising more and change diet. Trying to lose weight and be active for his girlfriend    Asthma: stable with rescue inhaler and Spiriva. Denies chest pain, sob, gomez, palpitations or wheezing.    c/o 2 day hx of left thumb pain when trying to maneuver a folding chair. Able to move thumb, no redness. Takes Aleve which helps pain. Review of Systems   Constitutional: Negative for chills, diaphoresis, fever, malaise/fatigue and weight loss. HENT: Negative for congestion and sore throat. Eyes: Negative for blurred vision, double vision and photophobia. Respiratory: Negative for cough, shortness of breath and wheezing. Cardiovascular: Negative for chest pain, palpitations, orthopnea, claudication, leg swelling and PND. Gastrointestinal: Negative for abdominal pain, blood in stool, constipation, diarrhea, heartburn, nausea and vomiting. Genitourinary: Negative for dysuria, frequency and urgency. Musculoskeletal: Positive for joint pain. Negative for myalgias. Skin: Negative for itching and rash. Neurological: Negative for tingling, sensory change, weakness and headaches. Psychiatric/Behavioral: Negative for suicidal ideas.          Allergies   Allergen Reactions    Penicillins Unable to Obtain     Past Medical History:   Diagnosis Date    Asthma 5/5/2014    possible asthma r/o cardiac etiology h/o chest trauma as a child     Other specified cardiac dysrhythmias 5/5/2014    marked sinus bradycardia     Palpitations 5/5/2014    r/o arrythmia     Shortness of breath 5/5/2014    possible asthma r/o cardiac etiology h/o chest trauma as a child      Past Surgical History:   Procedure Laterality Date    HX ADENOIDECTOMY       Family History   Problem Relation Age of Onset    Diabetes Mother     Hypertension Mother     Heart Attack Father     Hypertension Sister     Cancer Maternal Aunt     Stroke Maternal Grandmother     Stroke Maternal Grandfather     Heart Surgery Neg Hx      History   Smoking Status    Never Smoker   Smokeless Tobacco    Never Used     Social History     Social History    Marital status: SINGLE     Spouse name: N/A    Number of children: N/A    Years of education: N/A     Occupational History    Not on file. Social History Main Topics    Smoking status: Never Smoker    Smokeless tobacco: Never Used    Alcohol use No    Drug use: No    Sexual activity: Not on file     Other Topics Concern    Not on file     Social History Narrative         OBJECTIVE:  Visit Vitals    /64    Pulse (!) 58    Temp 98.9 °F (37.2 °C)    Resp 18    Ht 5' 8\" (1.727 m)    Wt 348 lb (157.9 kg)    SpO2 97%    BMI 52.91 kg/m2     PHYSICAL EXAM:  Physical Exam   Constitutional: He is oriented to person, place, and time. He appears well-developed and well-nourished. Eyes: Conjunctivae are normal. Pupils are equal, round, and reactive to light. Cardiovascular: Normal rate, regular rhythm and intact distal pulses. Pulmonary/Chest: Effort normal and breath sounds normal. No respiratory distress. He has no wheezes. He has no rales. Abdominal: Soft. Bowel sounds are normal. He exhibits no distension. There is no tenderness. Musculoskeletal:        Left hand: He exhibits tenderness and bony tenderness. He exhibits normal range of motion, normal capillary refill, no deformity and no swelling. Normal sensation noted. Normal strength noted. Hands:  Lymphadenopathy:     He has no cervical adenopathy. Neurological: He is alert and oriented to person, place, and time. Skin: Skin is warm. Psychiatric: He has a normal mood and affect. His behavior is normal.       ASSESSMENT/PLAN:  Hilario Michaels was seen today for establish care and hand pain. Diagnoses and all orders for this visit:    Thumb pain, left  -     XR HAND LT MIN 3 V; Future    Moderate persistent asthma without complication    Essential hypertension with goal blood pressure less than 140/90    Morbid obesity due to excess calories (HCC)    Bradycardia     Medical record requested  Dr Iva Jack records reviewed    I have discussed the diagnosis with the patient and the intended plan as seen in the above orders. The patient has received an after-visit summary and questions were answered concerning future plans. I have discussed medication side effects and warnings with the patient as well. I have reviewed the plan of care with the patient, accepted their input and they are in agreement with the treatment goals. Patient verbalizes understanding. Follow-up Disposition:  Return in about 3 months (around 7/20/2017), or if symptoms worsen or fail to improve, for HTN, Asthma, Obesity.

## 2017-07-18 NOTE — TELEPHONE ENCOUNTER
Patient's last office visit on 04-20-17  Medication(s) last filled on 09-10-15 (Coreg), 03-09-17 Linsinopril, 03-13-17 (Vit D) and New Request  Next Appointment: 07-20-17  Rx Class Normal

## 2017-07-19 RX ORDER — LISINOPRIL 2.5 MG/1
2.5 TABLET ORAL DAILY
Qty: 30 TAB | Refills: 6 | Status: SHIPPED | OUTPATIENT
Start: 2017-07-19 | End: 2017-08-31 | Stop reason: SDUPTHER

## 2017-07-19 RX ORDER — VITAMIN E 268 MG
400 CAPSULE ORAL DAILY
Qty: 180 CAP | Refills: 3 | Status: SHIPPED | OUTPATIENT
Start: 2017-07-19 | End: 2017-08-31 | Stop reason: SDUPTHER

## 2017-07-19 RX ORDER — ERGOCALCIFEROL 1.25 MG/1
CAPSULE ORAL
Qty: 12 CAP | Refills: 0 | Status: SHIPPED | OUTPATIENT
Start: 2017-07-19 | End: 2017-08-31 | Stop reason: SDUPTHER

## 2017-07-19 RX ORDER — CARVEDILOL 6.25 MG/1
6.25 TABLET ORAL 2 TIMES DAILY WITH MEALS
Qty: 60 TAB | Refills: 6 | Status: SHIPPED | OUTPATIENT
Start: 2017-07-19 | End: 2017-08-31 | Stop reason: SDUPTHER

## 2017-07-21 ENCOUNTER — OFFICE VISIT (OUTPATIENT)
Dept: FAMILY MEDICINE CLINIC | Facility: CLINIC | Age: 22
End: 2017-07-21

## 2017-07-21 VITALS
WEIGHT: 315 LBS | SYSTOLIC BLOOD PRESSURE: 136 MMHG | HEART RATE: 72 BPM | OXYGEN SATURATION: 98 % | HEIGHT: 68 IN | DIASTOLIC BLOOD PRESSURE: 70 MMHG | RESPIRATION RATE: 18 BRPM | BODY MASS INDEX: 47.74 KG/M2 | TEMPERATURE: 97.9 F

## 2017-07-21 DIAGNOSIS — I10 ESSENTIAL HYPERTENSION WITH GOAL BLOOD PRESSURE LESS THAN 140/90: ICD-10-CM

## 2017-07-21 DIAGNOSIS — M54.50 BILATERAL LOW BACK PAIN WITHOUT SCIATICA, UNSPECIFIED CHRONICITY: ICD-10-CM

## 2017-07-21 DIAGNOSIS — M54.6 RIGHT-SIDED THORACIC BACK PAIN, UNSPECIFIED CHRONICITY: ICD-10-CM

## 2017-07-21 DIAGNOSIS — J45.40 MODERATE PERSISTENT ASTHMA WITHOUT COMPLICATION: ICD-10-CM

## 2017-07-21 DIAGNOSIS — E66.01 MORBID OBESITY DUE TO EXCESS CALORIES (HCC): ICD-10-CM

## 2017-07-21 DIAGNOSIS — I10 ESSENTIAL HYPERTENSION WITH GOAL BLOOD PRESSURE LESS THAN 140/90: Primary | ICD-10-CM

## 2017-07-21 RX ORDER — CYCLOBENZAPRINE HCL 10 MG
10 TABLET ORAL
Qty: 30 TAB | Refills: 0 | Status: SHIPPED | OUTPATIENT
Start: 2017-07-21 | End: 2017-08-31 | Stop reason: SDUPTHER

## 2017-07-21 NOTE — MR AVS SNAPSHOT
Visit Information Date & Time Provider Department Dept. Phone Encounter #  
 7/21/2017  9:30 AM Dequan Card MD [facility] [place] [phone] [id number] Follow-up Instructions Return in about 3 months (around 10/21/2017), or if symptoms worsen or fail to improve, for HTN, Asthma, obesity. Your Appointments 9/7/2017 10:00 AM  
ESTABLISHED PATIENT with Miguel Montelongo MD  
[facility]) Appt Note: 6 months [address] [facility], [address] [facility] [phone] [address] [facility], [address] [place] [address] [address] [place] Upcoming Health Maintenance Date Due Pneumococcal 19-64 Medium Risk (1 of 1 - PPSV23) 4/4/2013 DTaP/Tdap/Td series (2 - Td) 1/1/2015 INFLUENZA AGE 9 TO ADULT 8/1/2017 Allergies as of 7/21/2017  Review Complete On: 7/21/2017 By: Hoang Mccoy Severity Noted Reaction Type Reactions Penicillins High 05/05/2014    Unable to Obtain Current Immunizations  Never Reviewed No immunizations on file. Not reviewed this visit You Were Diagnosed With   
  
 Codes Comments Essential hypertension with goal blood pressure less than 140/90    -  Primary ICD-10-CM: I10 
ICD-9-CM: 401.9 Moderate persistent asthma without complication     EYS-86-HF: J45.40 ICD-9-CM: 493.90 Morbid obesity due to excess calories (HCC)     ICD-10-CM: E66.01 
ICD-9-CM: 278.01 Vitals BP Pulse Temp Resp Height(growth percentile) Weight(growth percentile) 136/70 72 97.9 °F (36.6 °C) 18 5' 8\" (1.727 m) 345 lb (156.5 kg) SpO2 BMI Smoking Status 98% 52.46 kg/m2 Never Smoker Vitals History BMI and BSA Data Body Mass Index Body Surface Area  
 52.46 kg/m 2 2.74 m 2 Preferred Pharmacy Pharmacy Name Phone [address], [address] [place] [address] [phone] Your Updated Medication List  
  
   
This list is accurate as of: 7/21/17 10:05 AM.  Always use your most recent med list.  
  
  
  
  
 aspirin delayed-release 81 mg tablet Take  by mouth daily. carvedilol 6.25 mg tablet Commonly known as:  Rosalba Ellis Take 1 Tab by mouth two (2) times daily (with meals). fexofenadine 60 mg tablet Commonly known as:  ALLEGRA ALLERGY Take 1 Tab by mouth daily. lisinopril 2.5 mg tablet Commonly known as:  Fela Adolfo Take 1 Tab by mouth daily. LOPID 600 mg tablet Generic drug:  gemfibrozil Take 600 mg by mouth two (2) times a day. naproxen 500 mg tablet Commonly known as:  NAPROSYN Take 500 mg by mouth as needed. sodium chloride 0.65 % nasal spray Commonly known as:  SALINE NASAL  
1 [place] by Both Nostrils route as needed for Congestion. SPIRIVA RESPIMAT 1.25 mcg/actuation inhaler Generic drug:  tiotropium bromide Take 2 Puffs by inhalation daily. VENTOLIN HFA 90 mcg/actuation inhaler Generic drug:  albuterol Take  by inhalation as needed. VITAMIN D2 50,000 unit capsule Generic drug:  ergocalciferol  
take 1 capsule by mouth every week  
  
 vitamin E 400 unit capsule Commonly known as:  [address] Take 1 Cap by mouth daily. Follow-up Instructions Return in about 3 months (around 10/21/2017), or if symptoms worsen or fail to improve, for HTN, Asthma, obesity. To-Do List   
 07/21/2017 Lab:  HEMOGLOBIN A1C WITH EAG   
  
 07/21/2017 Lab:  LIPID PANEL   
  
 07/21/2017 Lab:  METABOLIC PANEL, COMPREHENSIVE   
  
 07/21/2017 Lab:  TSH 3RD GENERATION Patient Instructions Learning About Asthma Triggers What are asthma triggers? When you have asthma, certain things can make your symptoms worse. These are called triggers.  Learn what triggers an asthma attack for you, and avoid the triggers when you can. Common triggers include colds, smoke, air pollution, dust, pollen, pets, stress, and cold air. How do asthma triggers affect you? Triggers can make it harder for your lungs to work as they should. They can lead to sudden breathing problems and other symptoms. When you are around a trigger, an asthma attack is more likely. If your symptoms are severe, you may need emergency treatment or have to go to the hospital for treatment. What can you do to avoid triggers? The first thing is to know your triggers. When you are having symptoms, note the things around you that might be causing them. Then look for patterns that may be triggering your symptoms. Record your triggers on a piece of paper or in an asthma diary. When you have your list of possible triggers, work with your doctor to find ways to avoid them. Avoid colds and flu. Get a pneumococcal vaccine shot. If you have had one before, ask your doctor whether you need a second dose. Get a flu vaccine every year, as soon as it's available. If you must be around people with colds or the flu, wash your hands often. Here are some ways to avoid a few common triggers. · Do not smoke or allow others to smoke around you. If you need help quitting, talk to your doctor about stop-smoking programs and medicines. These can increase your chances of quitting for good. · If there is a lot of pollution, pollen, or dust outside, stay at home and keep your windows closed. Use an air conditioner or air filter in your home. Check your local weather report or newspaper for air quality and pollen reports. What else should you know? · Take your controller medicine every day, not just when you have symptoms. It helps prevent problems before they occur. · Your doctor may suggest that you check how well your lungs are working by measuring your peak expiratory flow (PEF) throughout the day.  Your PEF may drop when you are near things that trigger symptoms. Where can you learn more? Go to http://leandra-giovanni.info/. Enter S336 in the search box to learn more about \"Learning About Asthma Triggers. \" Current as of: March 25, 2017 Content Version: 11.3 © 9116-9514 [facility]. Care instructions adapted under license by [employer] (which disclaims liability or warranty for this information). If you have questions about a medical condition or this instruction, always ask your healthcare professional. [address] any warranty or liability for your use of this information. Asthma: Your Action Plan Sample Action Plan Controller medicine action plan Fill in the blank spaces and boxes that apply for all sections. · Name of your controller medicine: 
¨ ____________________________________________ · How much of this medicine do you take? ¨ ____________________________________________ · How often do you take this medicine? ¨ ____________________________________________ · Other instructions? ¨ ____________________________________________ Quick-relief medicine action plan · Name of your quick-relief medicine: 
¨ ____________________________________________ · How much of this medicine do you take? ¨ ____________________________________________ · How often do you take this medicine? ¨ ____________________________________________ Asthma Zones GREEN ZONE: This is where you want to be! Green zone symptoms · You have no shortness of breath or chest tightness. You are not coughing or wheezing. · You can do all of your usual activities. · You sleep well at night. Green zone peak flow (if you use a peak flow meter) · ______ or more (80% or more of your personal best) Green zone actions (Check the boxes and fill in the blank spaces that apply.) [ ] You take your controller medicine(s) every day. [ ] Marianoleylapatricia Duartes are staying away from your asthma triggers. [ ] You take quick-relief medicine (called _____________________) ______ minutes before exercise. YELLOW ZONE: Your asthma is getting worse. Yellow zone symptoms · You are short of breath or have chest tightness. You are coughing or wheezing. · You have symptoms that keep you up at night. · You can do some, but not all, of your usual activities. Yellow zone peak flow (if you use a peak flow meter) · ______ to ______ (50% to 79% of your personal best) Yellow zone actions (Check the boxes and fill in the blank spaces that apply.) [ ] Take _____ puff(s) of quick-relief medicine called ______________________. Repeat _____ times. [ ] If your symptoms don't get better or your peak flow has not returned to the green zone in 1 hour, then: · [ ] Take _____ puff(s) of medicine called ______________________. Take it ____ times a day. · [ ] Begin or increase treatment with corticosteroid pills. Take ______ mg of medicine called ____________________________ every __________. · [ ] Call your doctor at this number: ____________________. RED ZONE: Danger! Red zone symptoms · You are very short of breath. · You can't do your usual activities. · Quick-relief medicine doesn't help. Or your symptoms don't get better after 24 hours in the yellow zone. Red zone peak flow (if you use a peak flow meter) · Less than _______ (less than 50% of your personal best) Red zone actions (Check the boxes and fill in the blank spaces that apply.) [ ] Take _____ puff(s) of quick-relief medicine called ____________________________. Repeat ______ times. [ ] Begin or increase treatment with corticosteroid pills. Take ________ mg now. [ ] Call your doctor at this number: _________________. If you can't contact your doctor, go to the emergency department. Call 911 or ___________________. [ ] Other numbers you might call are: ___________________________________. When should you call for help? Call 911 anytime you think you may need emergency care. For example, call if: 
· You have severe trouble breathing. Call your doctor now or seek immediate medical care if: 
· You are in the red zone of your asthma action plan. · You've used your quick-relief medicine but are still having trouble breathing. · You cough up blood. · You have new or worse trouble breathing. · You cough up dark brown or bloody mucus (sputum). Watch closely for changes in your health, and be sure to contact your doctor if: 
· You need to use quick-relief medicine more than 2 days each week (unless it's just for exercise). · Your coughing and wheezing get worse. Follow-up care is a key part of your treatment and safety. Be sure to make and go to all appointments, and call your doctor if you are having problems. It's also a good idea to know your test results and keep a list of the medicines you take. Where can you learn more? Go to http://leandra-giovanni.info/. Enter 79 [id number] in the search box to learn more about \"Asthma: Your Action Plan. \" Current as of: March 25, 2017 Content Version: 11.3 © 2932-7242 [employer]. Care instructions adapted under license by [employer] (which disclaims liability or warranty for this information). If you have questions about a medical condition or this instruction, always ask your healthcare professional. [place] [address] any warranty or liability for your use of this information. DASH Diet: Care Instructions Your Care Instructions The DASH diet is an eating plan that can help lower your blood pressure. DASH stands for Dietary Approaches to Stop Hypertension. Hypertension is high blood pressure. The DASH diet focuses on eating foods that are high in calcium, potassium, and magnesium. These nutrients can lower blood pressure.  The foods that are highest in these nutrients are fruits, vegetables, low-fat dairy products, nuts, seeds, and legumes. But taking calcium, potassium, and magnesium supplements instead of eating foods that are high in those nutrients does not have the same effect. The DASH diet also includes whole grains, fish, and poultry. The DASH diet is one of several lifestyle changes your doctor may recommend to lower your high blood pressure. Your doctor may also want you to decrease the amount of sodium in your diet. Lowering sodium while following the DASH diet can lower blood pressure even further than just the DASH diet alone. Follow-up care is a key part of your treatment and safety. Be sure to make and go to all appointments, and call your doctor if you are having problems. It's also a good idea to know your test results and keep a list of the medicines you take. How can you care for yourself at home? Following the DASH diet · Eat 4 to 5 servings of fruit each day. A serving is 1 medium-sized piece of fruit, ½ cup chopped or canned fruit, 1/4 cup dried fruit, or 4 ounces (½ cup) of fruit juice. Choose fruit more often than fruit juice. · Eat 4 to 5 servings of vegetables each day. A serving is 1 cup of lettuce or raw leafy vegetables, ½ cup of chopped or cooked vegetables, or 4 ounces (½ cup) of vegetable juice. Choose vegetables more often than vegetable juice. · Get 2 to 3 servings of low-fat and fat-free dairy each day. A serving is 8 ounces of milk, 1 cup of yogurt, or 1 ½ ounces of cheese. · Eat 6 to 8 servings of grains each day. A serving is 1 slice of bread, 1 ounce of dry cereal, or ½ cup of cooked rice, pasta, or cooked cereal. Try to choose whole-grain products as much as possible. · Limit lean meat, poultry, and fish to 2 servings each day. A serving is 3 ounces, about the size of a deck of cards.  
· Eat 4 to 5 servings of nuts, seeds, and legumes (cooked dried beans, lentils, and split peas) each week. A serving is 1/3 cup of nuts, 2 tablespoons of seeds, or ½ cup of cooked beans or peas. · Limit fats and oils to 2 to 3 servings each day. A serving is 1 teaspoon of vegetable oil or 2 tablespoons of salad dressing. · Limit sweets and added sugars to 5 servings or less a week. A serving is 1 tablespoon jelly or jam, ½ cup sorbet, or 1 cup of lemonade. · Eat less than 2,300 milligrams (mg) of sodium a day. If you limit your sodium to 1,500 mg a day, you can lower your blood pressure even more. Tips for success · Start small. Do not try to make dramatic changes to your diet all at once. You might feel that you are missing out on your favorite foods and then be more likely to not follow the plan. Make small changes, and stick with them. Once those changes become habit, add a few more changes. · Try some of the following: ¨ Make it a goal to eat a fruit or vegetable at every meal and at snacks. This will make it easy to get the recommended amount of fruits and vegetables each day. ¨ Try yogurt topped with fruit and nuts for a snack or healthy dessert. ¨ Add lettuce, tomato, cucumber, and onion to sandwiches. ¨ Combine a ready-made pizza crust with low-fat mozzarella cheese and lots of vegetable toppings. Try using tomatoes, squash, spinach, broccoli, carrots, cauliflower, and onions. ¨ Have a variety of cut-up vegetables with a low-fat dip as an appetizer instead of chips and dip. ¨ Sprinkle sunflower seeds or chopped almonds over salads. Or try adding chopped walnuts or almonds to cooked vegetables. ¨ Try some vegetarian meals using beans and peas. Add garbanzo or kidney beans to salads. Make burritos and tacos with mashed greer beans or black beans. Where can you learn more? Go to http://leandra-giovanni.info/. Enter L876 in the search box to learn more about \"DASH Diet: Care Instructions. \" Current as of: April 3, 2017 Content Version: 11.3 © 5552-8928 [employer]orporated. Care instructions adapted under license by [employer] (which disclaims liability or warranty for this information). If you have questions about a medical condition or this instruction, always ask your healthcare professional. [address] any warranty or liability for your use of this information. Learning About Obesity What is obesity? Obesity means having so much body fat that your health is in danger. Having too much body fat can lead to type 2 diabetes, heart disease, high blood pressure, arthritis, sleep apnea, and stroke. Even if you don't feel bad now, think about these health risks. Do they seem like a good reason to start on a new path toward a healthier weight? Or do you have another personal, powerful reason for wanting to lose weight? Whatever it is, keep it in mind. It can be hard to change eating habits and exercise habits. But with your own reason and plan, you can do it. How do you know if your weight is in the obesity range? To know if your weight is in the obesity range, your doctor looks at your body mass index (BMI) and waist size. Your BMI is a number that is calculated from your weight and your height. To figure your BMI for yourself, get a BMI table from your doctor or use an online tool, such as http://www.iraheta.com/ on the [employer]. What causes obesity? When you take in more calories than you burn off, you gain weight. How you eat, how active you are, and other things affect how your body uses calories and whether you gain weight. If you have family members who have too much body fat, you may have inherited a tendency to gain weight. And your family also helps form your eating and lifestyle habits, which can lead to obesity. Also, our busy lives make it harder to plan and cook healthy meals.  For many of us, it's easier to reach for prepared foods, go out to eat, or go to the drive-through. But these foods are often high in saturated fat and calories. Portions are often too large. What can you do to reach a healthy weight? Focus on health, not diets. Diets are hard to stay on and don't work in the long run. It is very hard to stay with a diet that includes lots of big changes in your eating habits. Instead of a diet, focus on lifestyle changes that will improve your health and achieve the right balance of energy and calories. To lose weight, you need to burn more calories than you take in. You can do it by eating healthy foods in reasonable amounts and becoming more active, even a little bit every day. Making small changes over time can add up to a lot. Make a plan for change. Many people have found that naming their reasons for change and staying focused on their plan can make a big difference. Work with your doctor to create a plan that is right for you. · Ask yourself: Bhavana Mccoy are my personal, most powerful reasons for wanting this change? What will my life look like when I've made the change? \" · Set your long-term goal. Make it specific, such as \"I will lose x pounds. \" 
· Break your long-term goal into smaller, short-term goals. Make these small steps specific and within your reach, things you know you can do. These steps are what keep you going from day to day. How can you stay on your plan for change? Be ready. Choose to start during a time when there are few events that might trigger slip-ups, like holidays, social events, and high-stress periods. Decide on your first few steps. Most people have more success when they make small changes, one step at a time. For example, you might switch a daily candy bar to a piece of fruit, walk 10 minutes more, or add more vegetables to a meal. 
Line up your support people.  Make sure you're not going to be alone as you make this change. Connect with people who understand how important it is to you. Ask family members and friends for help in keeping with your plan. And think about who could make it harder for you, and how to handle them. Try tracking. People who keep track of what they eat, feel, and do are better at losing weight. Try writing down things like: · What and how much you eat. · How you feel before and after each meal. 
· Details about each meal (like eating out or at home, eating alone, or with friends or family). · What you do to be active. Look and plan. As you track, look for patterns that you may want to change. Take note of: · When you eat and whether you skip meals. · How often you eat out. · How many fruits and vegetables you eat. · When you eat beyond feeling full. · When and why you eat for reasons other than being hungry. When you stray from your plan, don't get upset. Figure out what made you slip up and how you can fix it. Can you take medicines or have surgery to lose weight? Before your doctor will prescribe medicines or surgery, he or she will probably want you to be more active and follow your healthy eating plan for a period of time. These habits are key lifelong changes for managing your weight, with or without other medical treatment. And these changes can help you avoid weight-related health problems. Follow-up care is a key part of your treatment and safety. Be sure to make and go to all appointments, and call your doctor if you are having problems. It's also a good idea to know your test results and keep a list of the medicines you take. Where can you learn more? Go to http://leandra-giovanni.info/. Enter N111 in the search box to learn more about \"Learning About Obesity. \" Current as of: October 13, 2016 Content Version: 11.3 © 6866-5905 [employer].  Care instructions adapted under license by [address] (which disclaims liability or warranty for this information). If you have questions about a medical condition or this instruction, always ask your healthcare professional. [address] any warranty or liability for your use of this information. Starting a Weight Loss Plan: Care Instructions Your Care Instructions If you are thinking about losing weight, it can be hard to know where to start. Your doctor can help you set up a weight loss plan that best meets your needs. You may want to take a class on nutrition or exercise, or join a weight loss support group. If you have questions about how to make changes to your eating or exercise habits, ask your doctor about seeing a registered dietitian or an exercise specialist. 
It can be a big challenge to lose weight. But you do not have to make huge changes at once. Make small changes, and stick with them. When those changes become habit, add a few more changes. If you do not think you are ready to make changes right now, try to pick a date in the future. Make an appointment to see your doctor to discuss whether the time is right for you to start a plan. Follow-up care is a key part of your treatment and safety. Be sure to make and go to all appointments, and call your doctor if you are having problems. Its also a good idea to know your test results and keep a list of the medicines you take. How can you care for yourself at home? · Set realistic goals. Many people expect to lose much more weight than is likely. A weight loss of 5% to 10% of your body weight may be enough to improve your health. · Get family and friends involved to provide support. Talk to them about why you are trying to lose weight, and ask them to help. They can help by participating in exercise and having meals with you, even if they may be eating something different. · Find what works best for you. If you do not have time or do not like to cook, a program that offers meal replacement bars or shakes may be better for you. Or if you like to prepare meals, finding a plan that includes daily menus and recipes may be best. 
· Ask your doctor about other health professionals who can help you achieve your weight loss goals. ¨ A dietitian can help you make healthy changes in your diet. ¨ An exercise specialist or  can help you develop a safe and effective exercise program. 
¨ A counselor or psychiatrist can help you cope with issues such as depression, anxiety, or family problems that can make it hard to focus on weight loss. · Consider joining a support group for people who are trying to lose weight. Your doctor can suggest groups in your area. Where can you learn more? Go to http://leandraINSOMENIAgiovanni.info/. Enter K636 in the search box to learn more about \"Starting a Weight Loss Plan: Care Instructions. \" Current as of: October 13, 2016 Content Version: 11.3 © 3771-2094 [employer]. Care instructions adapted under license by [employer] (which disclaims liability or warranty for this information). If you have questions about a medical condition or this instruction, always ask your healthcare professional. [address] any warranty or liability for your use of this information. Introducing [facility]! [employer] introduces Wonderloop patient portal. Now you can access parts of your medical record, email your doctor's office, and request medication refills online. 1. In your internet browser, go to [email or web address] 2. Click on the First Time User? Click Here link in the Sign In box. You will see the New Member Sign Up page. 3. Enter your Wonder[employer] Access Code exactly as it appears below.  You will not need to use this code after youve completed the sign-up process. If you do not sign up before the expiration date, you must request a new code. · Zuse Access Code: [email or web address] Expires: 10/19/2017 10:05 AM 
 
4. Enter the last four digits of your Social Security Number (xxxx) and Date of Birth (mm/dd/yyyy) as indicated and click Submit. You will be taken to the next sign-up page. 5. Create a Zuse ID. This will be your Zuse login ID and cannot be changed, so think of one that is secure and easy to remember. 6. Create a Zuse password. You can change your password at any time. 7. Enter your Password Reset Question and Answer. This can be used at a later time if you forget your password. 8. Enter your e-mail address. You will receive e-mail notification when new information is available in [address] 9. Click Sign Up. You can now view and download portions of your medical record. 10. Click the Download Summary menu link to download a portable copy of your medical information. If you have questions, please visit the Frequently Asked Questions section of the Zuse website. Remember, Zuse is NOT to be used for urgent needs. For medical emergencies, dial 911. Now available from your iPhone and Android! Please provide this summary of care documentation to your next provider. Your primary care clinician is listed as Jossy Rodriguez. If you have any questions after today's visit, please call [phone].

## 2017-07-21 NOTE — PATIENT INSTRUCTIONS
Learning About Asthma Triggers  What are asthma triggers? When you have asthma, certain things can make your symptoms worse. These are called triggers. Learn what triggers an asthma attack for you, and avoid the triggers when you can. Common triggers include colds, smoke, air pollution, dust, pollen, pets, stress, and cold air. How do asthma triggers affect you? Triggers can make it harder for your lungs to work as they should. They can lead to sudden breathing problems and other symptoms. When you are around a trigger, an asthma attack is more likely. If your symptoms are severe, you may need emergency treatment or have to go to the hospital for treatment. What can you do to avoid triggers? The first thing is to know your triggers. When you are having symptoms, note the things around you that might be causing them. Then look for patterns that may be triggering your symptoms. Record your triggers on a piece of paper or in an asthma diary. When you have your list of possible triggers, work with your doctor to find ways to avoid them. Avoid colds and flu. Get a pneumococcal vaccine shot. If you have had one before, ask your doctor whether you need a second dose. Get a flu vaccine every year, as soon as it's available. If you must be around people with colds or the flu, wash your hands often. Here are some ways to avoid a few common triggers. · Do not smoke or allow others to smoke around you. If you need help quitting, talk to your doctor about stop-smoking programs and medicines. These can increase your chances of quitting for good. · If there is a lot of pollution, pollen, or dust outside, stay at home and keep your windows closed. Use an air conditioner or air filter in your home. Check your local weather report or newspaper for air quality and pollen reports. What else should you know? · Take your controller medicine every day, not just when you have symptoms.  It helps prevent problems before they occur. · Your doctor may suggest that you check how well your lungs are working by measuring your peak expiratory flow (PEF) throughout the day. Your PEF may drop when you are near things that trigger symptoms. Where can you learn more? Go to http://leandra-giovanni.info/. Enter V537 in the search box to learn more about \"Learning About Asthma Triggers. \"  Current as of: March 25, 2017  Content Version: 11.3  © 2006-2017 [employer]. Care instructions adapted under license by [employer] (which disclaims liability or warranty for this information). If you have questions about a medical condition or this instruction, always ask your healthcare professional. [address] any warranty or liability for your use of this information. Asthma: Your Action Plan  Sample Action Plan  Controller medicine action plan  Fill in the blank spaces and boxes that apply for all sections. · Name of your controller medicine:  ¨ ____________________________________________  · How much of this medicine do you take? ¨ ____________________________________________  · How often do you take this medicine? ¨ ____________________________________________  · Other instructions? ¨ ____________________________________________  Quick-relief medicine action plan  · Name of your quick-relief medicine:  ¨ ____________________________________________  · How much of this medicine do you take? ¨ ____________________________________________  · How often do you take this medicine? ¨ ____________________________________________  Asthma Zones  GREEN ZONE: This is where you want to be! Green zone symptoms  · You have no shortness of breath or chest tightness. You are not coughing or wheezing. · You can do all of your usual activities. · You sleep well at night.   Green zone peak flow (if you use a peak flow meter)  · ______ or more (80% or more of your personal best)  Green zone actions (Check the boxes and fill in the blank spaces that apply.)  [ ] You take your controller medicine(s) every day. [ ] Veryl Meager are staying away from your asthma triggers. [ ] You take quick-relief medicine (called _____________________) ______ minutes before exercise. YELLOW ZONE: Your asthma is getting worse. Yellow zone symptoms  · You are short of breath or have chest tightness. You are coughing or wheezing. · You have symptoms that keep you up at night. · You can do some, but not all, of your usual activities. Yellow zone peak flow (if you use a peak flow meter)  · ______ to ______ (50% to 79% of your personal best)  Yellow zone actions (Check the boxes and fill in the blank spaces that apply.)  [ ] Take _____ puff(s) of quick-relief medicine called ______________________. Repeat _____ times. [ ] If your symptoms don't get better or your peak flow has not returned to the green zone in 1 hour, then:  · [ ] Take _____ puff(s) of medicine called ______________________. Take it ____ times a day. · [ ] Begin or increase treatment with corticosteroid pills. Take ______ mg of medicine called ____________________________ every __________. · [ ] Call your doctor at this number: ____________________. RED ZONE: Danger! Red zone symptoms  · You are very short of breath. · You can't do your usual activities. · Quick-relief medicine doesn't help. Or your symptoms don't get better after 24 hours in the yellow zone. Red zone peak flow (if you use a peak flow meter)  · Less than _______ (less than 50% of your personal best)  Red zone actions (Check the boxes and fill in the blank spaces that apply.)  [ ] Take _____ puff(s) of quick-relief medicine called ____________________________. Repeat ______ times. [ ] Begin or increase treatment with corticosteroid pills. Take ________ mg now. [ ] Call your doctor at this number: _________________. If you can't contact your doctor, go to the emergency department.  Call 911 or ___________________. [ ] Other numbers you might call are: ___________________________________. When should you call for help? Call 911 anytime you think you may need emergency care. For example, call if:  · You have severe trouble breathing. Call your doctor now or seek immediate medical care if:  · You are in the red zone of your asthma action plan. · You've used your quick-relief medicine but are still having trouble breathing. · You cough up blood. · You have new or worse trouble breathing. · You cough up dark brown or bloody mucus (sputum). Watch closely for changes in your health, and be sure to contact your doctor if:  · You need to use quick-relief medicine more than 2 days each week (unless it's just for exercise). · Your coughing and wheezing get worse. Follow-up care is a key part of your treatment and safety. Be sure to make and go to all appointments, and call your doctor if you are having problems. It's also a good idea to know your test results and keep a list of the medicines you take. Where can you learn more? Go to http://leandra-giovanni.info/. Enter 06 16 99 in the search box to learn more about \"Asthma: Your Action Plan. \"  Current as of: March 25, 2017  Content Version: 11.3  © 3165-5701 [employer]. Care instructions adapted under license by [employer] (which disclaims liability or warranty for this information). If you have questions about a medical condition or this instruction, always ask your healthcare professional. [address] any warranty or liability for your use of this information. DASH Diet: Care Instructions  Your Care Instructions  The DASH diet is an eating plan that can help lower your blood pressure. DASH stands for Dietary Approaches to Stop Hypertension. Hypertension is high blood pressure. The DASH diet focuses on eating foods that are high in calcium, potassium, and magnesium.  These nutrients can lower blood pressure. The foods that are highest in these nutrients are fruits, vegetables, low-fat dairy products, nuts, seeds, and legumes. But taking calcium, potassium, and magnesium supplements instead of eating foods that are high in those nutrients does not have the same effect. The DASH diet also includes whole grains, fish, and poultry. The DASH diet is one of several lifestyle changes your doctor may recommend to lower your high blood pressure. Your doctor may also want you to decrease the amount of sodium in your diet. Lowering sodium while following the DASH diet can lower blood pressure even further than just the DASH diet alone. Follow-up care is a key part of your treatment and safety. Be sure to make and go to all appointments, and call your doctor if you are having problems. It's also a good idea to know your test results and keep a list of the medicines you take. How can you care for yourself at home? Following the DASH diet  · Eat 4 to 5 servings of fruit each day. A serving is 1 medium-sized piece of fruit, ½ cup chopped or canned fruit, 1/4 cup dried fruit, or 4 ounces (½ cup) of fruit juice. Choose fruit more often than fruit juice. · Eat 4 to 5 servings of vegetables each day. A serving is 1 cup of lettuce or raw leafy vegetables, ½ cup of chopped or cooked vegetables, or 4 ounces (½ cup) of vegetable juice. Choose vegetables more often than vegetable juice. · Get 2 to 3 servings of low-fat and fat-free dairy each day. A serving is 8 ounces of milk, 1 cup of yogurt, or 1 ½ ounces of cheese. · Eat 6 to 8 servings of grains each day. A serving is 1 slice of bread, 1 ounce of dry cereal, or ½ cup of cooked rice, pasta, or cooked cereal. Try to choose whole-grain products as much as possible. · Limit lean meat, poultry, and fish to 2 servings each day. A serving is 3 ounces, about the size of a deck of cards.   · Eat 4 to 5 servings of nuts, seeds, and legumes (cooked dried beans, lentils, and split peas) each week. A serving is 1/3 cup of nuts, 2 tablespoons of seeds, or ½ cup of cooked beans or peas. · Limit fats and oils to 2 to 3 servings each day. A serving is 1 teaspoon of vegetable oil or 2 tablespoons of salad dressing. · Limit sweets and added sugars to 5 servings or less a week. A serving is 1 tablespoon jelly or jam, ½ cup sorbet, or 1 cup of lemonade. · Eat less than 2,300 milligrams (mg) of sodium a day. If you limit your sodium to 1,500 mg a day, you can lower your blood pressure even more. Tips for success  · Start small. Do not try to make dramatic changes to your diet all at once. You might feel that you are missing out on your favorite foods and then be more likely to not follow the plan. Make small changes, and stick with them. Once those changes become habit, add a few more changes. · Try some of the following:  ¨ Make it a goal to eat a fruit or vegetable at every meal and at snacks. This will make it easy to get the recommended amount of fruits and vegetables each day. ¨ Try yogurt topped with fruit and nuts for a snack or healthy dessert. ¨ Add lettuce, tomato, cucumber, and onion to sandwiches. ¨ Combine a ready-made pizza crust with low-fat mozzarella cheese and lots of vegetable toppings. Try using tomatoes, squash, spinach, broccoli, carrots, cauliflower, and onions. ¨ Have a variety of cut-up vegetables with a low-fat dip as an appetizer instead of chips and dip. ¨ Sprinkle sunflower seeds or chopped almonds over salads. Or try adding chopped walnuts or almonds to cooked vegetables. ¨ Try some vegetarian meals using beans and peas. Add garbanzo or kidney beans to salads. Make burritos and tacos with mashed greer beans or black beans. Where can you learn more? Go to http://leandra-giovanni.info/. Enter Z808 in the search box to learn more about \"DASH Diet: Care Instructions. \"  Current as of: April 3, 2017  Content Version: 11.3  © 1671-9059 [employer]. Care instructions adapted under license by "[employer] (which disclaims liability or warranty for this information). If you have questions about a medical condition or this instruction, always ask your healthcare professional. [address] any warranty or liability for your use of this information. Learning About Obesity  What is obesity? Obesity means having so much body fat that your health is in danger. Having too much body fat can lead to type 2 diabetes, heart disease, high blood pressure, arthritis, sleep apnea, and stroke. Even if you don't feel bad now, think about these health risks. Do they seem like a good reason to start on a new path toward a healthier weight? Or do you have another personal, powerful reason for wanting to lose weight? Whatever it is, keep it in mind. It can be hard to change eating habits and exercise habits. But with your own reason and plan, you can do it. How do you know if your weight is in the obesity range? To know if your weight is in the obesity range, your doctor looks at your body mass index (BMI) and waist size. Your BMI is a number that is calculated from your weight and your height. To figure your BMI for yourself, get a BMI table from your doctor or use an online tool, such as http://www.iraheta.com/ on the [employer]. What causes obesity? When you take in more calories than you burn off, you gain weight. How you eat, how active you are, and other things affect how your body uses calories and whether you gain weight. If you have family members who have too much body fat, you may have inherited a tendency to gain weight. And your family also helps form your eating and lifestyle habits, which can lead to obesity. Also, our busy lives make it harder to plan and cook healthy meals.  For many of us, it's easier to reach for prepared foods, go out to eat, or go to the drive-through. But these foods are often high in saturated fat and calories. Portions are often too large. What can you do to reach a healthy weight? Focus on health, not diets. Diets are hard to stay on and don't work in the long run. It is very hard to stay with a diet that includes lots of big changes in your eating habits. Instead of a diet, focus on lifestyle changes that will improve your health and achieve the right balance of energy and calories. To lose weight, you need to burn more calories than you take in. You can do it by eating healthy foods in reasonable amounts and becoming more active, even a little bit every day. Making small changes over time can add up to a lot. Make a plan for change. Many people have found that naming their reasons for change and staying focused on their plan can make a big difference. Work with your doctor to create a plan that is right for you. · Ask yourself: [place] are my personal, most powerful reasons for wanting this change? What will my life look like when I've made the change? \"  · Set your long-term goal. Make it specific, such as \"I will lose x pounds. \"  · Break your long-term goal into smaller, short-term goals. Make these small steps specific and within your reach, things you know you can do. These steps are what keep you going from day to day. How can you stay on your plan for change? Be ready. Choose to start during a time when there are few events that might trigger slip-ups, like holidays, social events, and high-stress periods. Decide on your first few steps. Most people have more success when they make small changes, one step at a time. For example, you might switch a daily candy bar to a piece of fruit, walk 10 minutes more, or add more vegetables to a meal.  Line up your support people. Make sure you're not going to be alone as you make this change.  Connect with people who understand how important it is to you. Ask family members and friends for help in keeping with your plan. And think about who could make it harder for you, and how to handle them. Try tracking. People who keep track of what they eat, feel, and do are better at losing weight. Try writing down things like:  · What and how much you eat. · How you feel before and after each meal.  · Details about each meal (like eating out or at home, eating alone, or with friends or family). · What you do to be active. Look and plan. As you track, look for patterns that you may want to change. Take note of:  · When you eat and whether you skip meals. · How often you eat out. · How many fruits and vegetables you eat. · When you eat beyond feeling full. · When and why you eat for reasons other than being hungry. When you stray from your plan, don't get upset. Figure out what made you slip up and how you can fix it. Can you take medicines or have surgery to lose weight? Before your doctor will prescribe medicines or surgery, he or she will probably want you to be more active and follow your healthy eating plan for a period of time. These habits are key lifelong changes for managing your weight, with or without other medical treatment. And these changes can help you avoid weight-related health problems. Follow-up care is a key part of your treatment and safety. Be sure to make and go to all appointments, and call your doctor if you are having problems. It's also a good idea to know your test results and keep a list of the medicines you take. Where can you learn more? Go to http://leandra-giovanni.info/. Enter N111 in the search box to learn more about \"Learning About Obesity. \"  Current as of: October 13, 2016  Content Version: 11.3  © 9459-8108 [employer]. Care instructions adapted under license by [employer] (which disclaims liability or warranty for this information).  If you have questions about a medical condition or this instruction, always ask your healthcare professional. [address] any warranty or liability for your use of this information. Starting a Weight Loss Plan: Care Instructions  Your Care Instructions  If you are thinking about losing weight, it can be hard to know where to start. Your doctor can help you set up a weight loss plan that best meets your needs. You may want to take a class on nutrition or exercise, or join a weight loss support group. If you have questions about how to make changes to your eating or exercise habits, ask your doctor about seeing a registered dietitian or an exercise specialist.  It can be a big challenge to lose weight. But you do not have to make huge changes at once. Make small changes, and stick with them. When those changes become habit, add a few more changes. If you do not think you are ready to make changes right now, try to pick a date in the future. Make an appointment to see your doctor to discuss whether the time is right for you to start a plan. Follow-up care is a key part of your treatment and safety. Be sure to make and go to all appointments, and call your doctor if you are having problems. Its also a good idea to know your test results and keep a list of the medicines you take. How can you care for yourself at home? · Set realistic goals. Many people expect to lose much more weight than is likely. A weight loss of 5% to 10% of your body weight may be enough to improve your health. · Get family and friends involved to provide support. Talk to them about why you are trying to lose weight, and ask them to help. They can help by participating in exercise and having meals with you, even if they may be eating something different. · Find what works best for you. If you do not have time or do not like to cook, a program that offers meal replacement bars or shakes may be better for you.  Or if you like to prepare meals, finding a plan that includes daily menus and recipes may be best.  · Ask your doctor about other health professionals who can help you achieve your weight loss goals. ¨ A dietitian can help you make healthy changes in your diet. ¨ An exercise specialist or  can help you develop a safe and effective exercise program.  ¨ A counselor or psychiatrist can help you cope with issues such as depression, anxiety, or family problems that can make it hard to focus on weight loss. · Consider joining a support group for people who are trying to lose weight. Your doctor can suggest groups in your area. Where can you learn more? Go to http://leandra[employer]giovanni.info/. Enter G497 in the search box to learn more about \"Starting a Weight Loss Plan: Care Instructions. \"  Current as of: October 13, 2016  Content Version: 11.3  © 9493-5728 [employer]. Care instructions adapted under license by [employer] (which disclaims liability or warranty for this information). If you have questions about a medical condition or this instruction, always ask your healthcare professional. [address] any warranty or liability for your use of this information.

## 2017-07-21 NOTE — PROGRESS NOTES
Chief Complaint   Patient presents with    Asthma    Hypertension    Obesity     HPI:  Follow-up  HTN: stable on lisinopril and Coreg without complaints of lightheadness, dizziness, fatigue, headache, blurry vision or focal deficits.     Morbid Obesity: planning to start exercising more and change diet. Trying to lose weight and be active for his girlfriend, He has lost a few lbs and is happy with this.     Asthma: stable with rescue inhaler and Spiriva. Denies chest pain, sob, gomez, palpitations or wheezing. Having some mid back pain after moving some furniture around. He tells me aleve helps. Pain is 5/10. Non radiating. Denies bowel or bladder changes, f,c,n,v,weakness, numbness or tingling. Patient Active Problem List   Diagnosis Code    Palpitations R00.2    Shortness of breath R06.02    Asthma J45.909    Other specified cardiac dysrhythmias I49.8    Cardiomyopathy ([address] I42.9    Essential hypertension with goal blood pressure less than 140/90 I10       Review of Systems   Complete ROS negative except where noted in HPI    Objective:     Visit Vitals    /70    Pulse 72    Temp 97.9 °F (36.6 °C)    Resp 18    Ht 5' 8\" (1.727 m)    Wt 345 lb (156.5 kg)    SpO2 98%    BMI 52.46 kg/m2     No exam data present    Constitutional: The patient appears well, NAD, Alert & Oriented x 3  Psych: Normal Mood, Normal Behavior  ENT: DINESH, EOMI, no scleral icterus  Lungs: CTAB,  Normal effort , Good air entry, No W/R/R   Cardiovascular:RRR, No M/R/G, S1 and S2 normal  MSK: Normal ROM, DTRs intact, sensation and strength intact  Extremities: negative LE edema, feet: warm, good capillary refill    Marcelina Christiana was seen today for asthma, hypertension and obesity. Diagnoses and all orders for this visit:    Essential hypertension with goal blood pressure less than 611/87  -     METABOLIC PANEL, COMPREHENSIVE;  Future    Moderate persistent asthma without complication    Morbid obesity due to excess calories ([facility]  -     LIPID PANEL; Future  -     HEMOGLOBIN A1C WITH EAG; Future  -     TSH 3RD GENERATION; Future    Bilateral low back pain without sciatica, unspecified chronicity  -     cyclobenzaprine (FLEXERIL) 10 mg tablet; Take 1 Tab by mouth nightly. Right-sided thoracic back pain, unspecified chronicity  -     cyclobenzaprine (FLEXERIL) 10 mg tablet; Take 1 Tab by mouth nightly. I have discussed the diagnosis with the patient and the intended plan as seen in the above orders. The patient has received an after-visit summary and questions were answered concerning future plans. I have discussed medication side effects and warnings with the patient as well. I have reviewed the plan of care with the patient, accepted their input and they are in agreement with the treatment goals. Patient verbalizes understanding. Follow-up Disposition:  Return in about 3 months (around 10/21/2017), or if symptoms worsen or fail to improve, for HTN, Asthma, obesity.

## 2017-08-23 RX ORDER — LISINOPRIL 2.5 MG/1
2.5 TABLET ORAL DAILY
Qty: 30 TAB | Refills: 6 | Status: CANCELLED | OUTPATIENT
Start: 2017-08-23

## 2017-08-23 RX ORDER — VITAMIN E 268 MG
400 CAPSULE ORAL DAILY
Qty: 180 CAP | Refills: 3 | Status: CANCELLED | OUTPATIENT
Start: 2017-08-23

## 2017-08-23 RX ORDER — CARVEDILOL 6.25 MG/1
6.25 TABLET ORAL 2 TIMES DAILY WITH MEALS
Qty: 60 TAB | Refills: 6 | Status: CANCELLED | OUTPATIENT
Start: 2017-08-23

## 2017-08-23 RX ORDER — ERGOCALCIFEROL 1.25 MG/1
CAPSULE ORAL
Qty: 12 CAP | Refills: 0 | Status: CANCELLED | OUTPATIENT
Start: 2017-08-23

## 2017-08-23 NOTE — TELEPHONE ENCOUNTER
Requested Prescriptions     Pending Prescriptions Disp Refills    vitamin E (AQUA GEMS) 400 unit capsule 180 Cap 3     Sig: Take 1 Cap by mouth daily.  carvedilol (COREG) 6.25 mg tablet 60 Tab 6     Sig: Take 1 Tab by mouth two (2) times daily (with meals).  VITAMIN D2 50,000 unit capsule 12 Cap 0     Sig: take 1 capsule by mouth every week    lisinopril (PRINIVIL, ZESTRIL) 2.5 mg tablet 30 Tab 6     Sig: Take 1 Tab by mouth daily.

## 2017-08-31 ENCOUNTER — OFFICE VISIT (OUTPATIENT)
Dept: FAMILY MEDICINE CLINIC | Facility: CLINIC | Age: 22
End: 2017-08-31

## 2017-08-31 VITALS
TEMPERATURE: 96.9 F | RESPIRATION RATE: 18 BRPM | OXYGEN SATURATION: 98 % | HEART RATE: 73 BPM | DIASTOLIC BLOOD PRESSURE: 62 MMHG | WEIGHT: 315 LBS | SYSTOLIC BLOOD PRESSURE: 124 MMHG | HEIGHT: 68 IN | BODY MASS INDEX: 47.74 KG/M2

## 2017-08-31 DIAGNOSIS — L21.9 SEBORRHEIC DERMATITIS OF SCALP: ICD-10-CM

## 2017-08-31 DIAGNOSIS — M54.6 RIGHT-SIDED THORACIC BACK PAIN, UNSPECIFIED CHRONICITY: ICD-10-CM

## 2017-08-31 DIAGNOSIS — M54.50 BILATERAL LOW BACK PAIN WITHOUT SCIATICA, UNSPECIFIED CHRONICITY: ICD-10-CM

## 2017-08-31 DIAGNOSIS — I10 ESSENTIAL HYPERTENSION WITH GOAL BLOOD PRESSURE LESS THAN 140/90: Primary | ICD-10-CM

## 2017-08-31 DIAGNOSIS — E55.9 VITAMIN D DEFICIENCY: ICD-10-CM

## 2017-08-31 RX ORDER — ERGOCALCIFEROL 1.25 MG/1
CAPSULE ORAL
Qty: 12 CAP | Refills: 0 | Status: SHIPPED | OUTPATIENT
Start: 2017-08-31 | End: 2018-03-08

## 2017-08-31 RX ORDER — LISINOPRIL 2.5 MG/1
2.5 TABLET ORAL DAILY
Qty: 30 TAB | Refills: 6 | Status: SHIPPED | OUTPATIENT
Start: 2017-08-31 | End: 2018-02-05 | Stop reason: SDUPTHER

## 2017-08-31 RX ORDER — SELENIUM SULFIDE 22.5 MG/ML
SHAMPOO TOPICAL
Qty: 180 ML | Refills: 3 | Status: SHIPPED | OUTPATIENT
Start: 2017-08-31 | End: 2018-02-05 | Stop reason: ALTCHOICE

## 2017-08-31 RX ORDER — VITAMIN E 268 MG
400 CAPSULE ORAL DAILY
Qty: 180 CAP | Refills: 3 | Status: SHIPPED | OUTPATIENT
Start: 2017-08-31 | End: 2018-03-08

## 2017-08-31 RX ORDER — CARVEDILOL 6.25 MG/1
6.25 TABLET ORAL 2 TIMES DAILY WITH MEALS
Qty: 60 TAB | Refills: 6 | Status: SHIPPED | OUTPATIENT
Start: 2017-08-31 | End: 2018-02-05 | Stop reason: SDUPTHER

## 2017-08-31 RX ORDER — CYCLOBENZAPRINE HCL 10 MG
10 TABLET ORAL
Qty: 30 TAB | Refills: 0 | Status: SHIPPED | OUTPATIENT
Start: 2017-08-31 | End: 2018-02-05 | Stop reason: SDUPTHER

## 2017-08-31 NOTE — MR AVS SNAPSHOT
Visit Information Date & Time Provider Department Dept. Phone Encounter #  
 8/31/2017  9:45 AM Tashi Pineda MD [address] [id number] Follow-up Instructions Return if symptoms worsen or fail to improve. Your Appointments 9/7/2017 10:00 AM  
ESTABLISHED PATIENT with Jamil Pereyra MD  
[facility]) Appt Note: 6 months [address] [facility], [address] [place] [id number] [address], [address] [address]  
  
    
 10/3/2017  9:45 AM  
ROUTINE CARE with Tashi Pineda MD  
[facility] Main Office ([address]) Appt Note: 3 mos follow up, HTN, Asthma, obesity [address] [facility] [address] [facility] [address]  
[phone]  
  
   
 [address] Upcoming Health Maintenance Date Due Pneumococcal 19-64 Medium Risk (1 of 1 - PPSV23) 4/4/2013 DTaP/Tdap/Td series (2 - Td) 1/1/2015 INFLUENZA AGE 9 TO ADULT 8/1/2017 Allergies as of 8/31/2017  Review Complete On: 8/31/2017 By: Regulo Bose Severity Noted Reaction Type Reactions Penicillins High 05/05/2014    Unable to Obtain Current Immunizations  Never Reviewed No immunizations on file. Not reviewed this visit You Were Diagnosed With   
  
 Codes Comments Essential hypertension with goal blood pressure less than 140/90    -  Primary ICD-10-CM: I10 
ICD-9-CM: 401.9 Vitamin D deficiency     ICD-10-CM: E55.9 ICD-9-CM: 268.9 Bilateral low back pain without sciatica, unspecified chronicity     ICD-10-CM: M54.5 ICD-9-CM: 724.2 Right-sided thoracic back pain, unspecified chronicity     ICD-10-CM: M54.6 ICD-9-CM: 724.1 Seborrheic dermatitis of scalp     ICD-10-CM: L21.9 ICD-9-CM: 690.18 Vitals BP Pulse Temp Resp Height(growth percentile) Weight(growth percentile) 124/62 73 96.9 °F (36.1 °C) 18 5' 8\" (1.727 m) 347 lb (157.4 kg) SpO2 BMI Smoking Status 98% 52.76 kg/m2 Never Smoker Vitals History BMI and BSA Data Body Mass Index Body Surface Area 52.76 kg/m 2 2.75 m 2 Preferred Pharmacy Pharmacy Name Phone [address], [address] [place], [place] [address] [phone] Your Updated Medication List  
  
   
This list is accurate as of: 8/31/17 10:29 AM.  Always use your most recent med list.  
  
  
  
  
 aspirin delayed-release 81 mg tablet Take  by mouth daily. carvedilol 6.25 mg tablet Commonly known as:  Danyell Shade Take 1 Tab by mouth two (2) times daily (with meals). cyclobenzaprine 10 mg tablet Commonly known as:  FLEXERIL Take 1 Tab by mouth nightly. fexofenadine 60 mg tablet Commonly known as:  ALLEGRA ALLERGY Take 1 Tab by mouth daily. lisinopril 2.5 mg tablet Commonly known as:  Nathaly Madison Take 1 Tab by mouth daily. LOPID 600 mg tablet Generic drug:  gemfibrozil Take 600 mg by mouth two (2) times a day. naproxen 500 mg tablet Commonly known as:  NAPROSYN Take 500 mg by mouth as needed. selenium sulfide 2.25 % Sham  
Use twice a week  
  
 sodium chloride 0.65 % nasal spray Commonly known as:  SALINE NASAL  
1 [place] by Both Nostrils route as needed for Congestion. SPIRIVA RESPIMAT 1.25 mcg/actuation inhaler Generic drug:  tiotropium bromide Take 2 Puffs by inhalation daily. VENTOLIN HFA 90 mcg/actuation inhaler Generic drug:  albuterol Take  by inhalation as needed. VITAMIN D2 50,000 unit capsule Generic drug:  ergocalciferol  
take 1 capsule by mouth every week  
  
 vitamin E 400 unit capsule Commonly known as:  [address] Take 1 Cap by mouth daily. Prescriptions Sent to Pharmacy Refills VITAMIN D2 50,000 unit capsule 0 Sig: take 1 capsule by mouth every week Class: Normal  
 Pharmacy: [address] [place] [address] Ph #: [phone] cyclobenzaprine (FLEXERIL) 10 mg tablet 0 Sig: Take 1 Tab by mouth nightly. Class: Normal  
 Pharmacy: [address] [place] [address] Ph #: [phone] Route: Oral  
 lisinopril (PRINIVIL, ZESTRIL) 2.5 mg tablet 6 Sig: Take 1 Tab by mouth daily. Class: Normal  
 Pharmacy: [address] [place] [address] Ph #: [phone] Route: Oral  
 vitamin E (AQUA GEMS) 400 unit capsule 3 Sig: Take 1 Cap by mouth daily. Class: Normal  
 Pharmacy: [address] [place] [address] Ph #: [phone] Route: Oral  
 carvedilol (COREG) 6.25 mg tablet 6 Sig: Take 1 Tab by mouth two (2) times daily (with meals). Class: Normal  
 Pharmacy: [address] [place] [address] Ph #: [phone] Route: Oral  
 selenium sulfide 2.25 % sham 3 Sig: Use twice a week Class: Normal  
 Pharmacy: [address] [place] [address] Ph #: [phone] Follow-up Instructions Return if symptoms worsen or fail to improve. Patient Instructions Seborrheic Dermatitis: Care Instructions Your Care Instructions Seborrheic dermatitis (say \"mvy-gmj-WBR-ick nzl-oqr-MY-tus\") is a skin problem that causes a reddish rash with greasy, flaky, yellow skin patches. The rash may appear on many parts of the body. It may be on the scalp, face (especially the eyebrow area and between the nose and mouth), ears, breasts, underarms, and genital area. The flaky skin on the scalp is called dandruff. This rash is often a long-term (chronic) condition. It may last for years. But the symptoms may come and go. Symptoms can be treated with special creams, shampoos, or other skin care. The cause of seborrheic dermatitis is not fully understood. It may occur when skin glands make too much oil. It may get worse in cold weather or with stress. A type of skin fungus, or yeast, may also be linked with this condition. Follow-up care is a key part of your treatment and safety. Be sure to make and go to all appointments, and call your doctor if you are having problems. It's also a good idea to know your test results and keep a list of the medicines you take. How can you care for yourself at home? · If your doctor prescribes a steroid cream, dandruff shampoo, or antifungal cream or medicine, use it as directed. If your doctor prescribes other medicine, take it as directed. · Use a dandruff shampoo if seborrheic dermatitis affects your scalp. This includes Head & Shoulders, Sebulex, and Selsun Blue. You may need to try a few kinds of shampoo to find the one that works best for you. · To help with itching: ¨ Use hydrocortisone cream. Follow the directions on the label. ¨ Use cold, wet cloths. ¨ Take an over-the-counter antihistamine, such as diphenhydramine (Benadryl) or loratadine (Claritin). Read and follow all instructions on the label. When should you call for help? Call your doctor now or seek immediate medical care if: 
· You have signs of infection, such as: 
¨ Increased pain, swelling, warmth, or redness. ¨ Red streaks leading from the rash. ¨ Pus draining from the rash. ¨ A fever. Watch closely for changes in your health, and be sure to contact your doctor if: · The rash gets worse or spreads to other parts of your body. · You do not get better as expected. Where can you learn more? Go to http://leandra-giovanni.info/. Enter L089 in the search box to learn more about \"Seborrheic Dermatitis: Care Instructions. \" Current as of: October 13, 2016 Content Version: 11.3 © 0746-1426 [employer].  Care instructions adapted under license by [address] (which disclaims liability or warranty for this information). If you have questions about a medical condition or this instruction, always ask your healthcare professional. [address] any warranty or liability for your use of this information. Introducing [facility]! Fredis Dodd introduces [employer] patient portal. Now you can access parts of your medical record, email your doctor's office, and request medication refills online. 1. In your internet browser, go to [email or web address] 2. Click on the First Time User? Click Here link in the Sign In box. You will see the New Member Sign Up page. 3. Enter your [employer] Access Code exactly as it appears below. You will not need to use this code after youve completed the sign-up process. If you do not sign up before the expiration date, you must request a new code. · [employer] Access Code: [email or web address] Expires: 10/19/2017 10:05 AM 
 
4. Enter the last four digits of your Social Security Number (xxxx) and Date of Birth (mm/dd/yyyy) as indicated and click Submit. You will be taken to the next sign-up page. 5. Create a [employer] ID. This will be your [employer] login ID and cannot be changed, so think of one that is secure and easy to remember. 6. Create a [employer] password. You can change your password at any time. 7. Enter your Password Reset Question and Answer. This can be used at a later time if you forget your password. 8. Enter your e-mail address. You will receive e-mail notification when new information is available in [address] 9. Click Sign Up. You can now view and download portions of your medical record. 10. Click the Download Summary menu link to download a portable copy of your medical information. If you have questions, please visit the Frequently Asked Questions section of the [employer] website.  Remember, [employer] is NOT to be used for urgent needs. For medical emergencies, dial 911. Now available from your iPhone and Android! Please provide this summary of care documentation to your next provider. Your primary care clinician is listed as Josh Carter. If you have any questions after today's visit, please call [phone].

## 2017-08-31 NOTE — PATIENT INSTRUCTIONS
Seborrheic Dermatitis: Care Instructions  Your Care Instructions  Seborrheic dermatitis (say \"vyr-lav-IDB-ick kui-ubm-QH-tus\") is a skin problem that causes a reddish rash with greasy, flaky, yellow skin patches. The rash may appear on many parts of the body. It may be on the scalp, face (especially the eyebrow area and between the nose and mouth), ears, breasts, underarms, and genital area. The flaky skin on the scalp is called dandruff. This rash is often a long-term (chronic) condition. It may last for years. But the symptoms may come and go. Symptoms can be treated with special creams, shampoos, or other skin care. The cause of seborrheic dermatitis is not fully understood. It may occur when skin glands make too much oil. It may get worse in cold weather or with stress. A type of skin fungus, or yeast, may also be linked with this condition. Follow-up care is a key part of your treatment and safety. Be sure to make and go to all appointments, and call your doctor if you are having problems. It's also a good idea to know your test results and keep a list of the medicines you take. How can you care for yourself at home? · If your doctor prescribes a steroid cream, dandruff shampoo, or antifungal cream or medicine, use it as directed. If your doctor prescribes other medicine, take it as directed. · Use a dandruff shampoo if seborrheic dermatitis affects your scalp. This includes Head & Shoulders, Sebulex, and Selsun Blue. You may need to try a few kinds of shampoo to find the one that works best for you. · To help with itching:  ¨ Use hydrocortisone cream. Follow the directions on the label. ¨ Use cold, wet cloths. ¨ Take an over-the-counter antihistamine, such as diphenhydramine (Benadryl) or loratadine (Claritin). Read and follow all instructions on the label. When should you call for help?   Call your doctor now or seek immediate medical care if:  · You have signs of infection, such as:  ¨ Increased pain, swelling, warmth, or redness. ¨ Red streaks leading from the rash. ¨ Pus draining from the rash. ¨ A fever. Watch closely for changes in your health, and be sure to contact your doctor if:  · The rash gets worse or spreads to other parts of your body. · You do not get better as expected. Where can you learn more? Go to http://leandra-giovanni.info/. Enter P410 in the search box to learn more about \"Seborrheic Dermatitis: Care Instructions. \"  Current as of: October 13, 2016  Content Version: 11.3  © 4060-4974 [employer]. Care instructions adapted under license by [employer] (which disclaims liability or warranty for this information). If you have questions about a medical condition or this instruction, always ask your healthcare professional. [address] any warranty or liability for your use of this information.

## 2017-08-31 NOTE — PROGRESS NOTES
Chief Complaint   Patient presents with    Medication Refill     HPI:  Follow-up  HTN: stable on Lisinopril and Coreg without complaints of lightheadness, dizziness, fatigue, headache, blurry vision or focal deficits.     Morbid Obesity: planning to start exercising more and change diet. Trying to lose weight and be active for his girlfriend, He has lost a few lbs and is happy with this.     Asthma: stable with rescue inhaler and Spiriva. Denies chest pain, sob, gomez, palpitations or wheezing. Mid back pain improved. 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 Seborrhea: needs refill on shampoo. Vitamin D deficiency: stable, needs refill on medication    Patient Active Problem List   Diagnosis Code    Palpitations R00.2    Shortness of breath R06.02    Asthma J45.909    Other specified cardiac dysrhythmias I49.8    Cardiomyopathy ([facility] I42.9    Essential hypertension with goal blood pressure less than 140/90 I10       Review of Systems   Complete ROS negative except where noted in HPI    Objective:     Visit Vitals    /62    Pulse 73    Temp 96.9 °F (36.1 °C)    Resp 18    Ht 5' 8\" (1.727 m)    Wt 347 lb (157.4 kg)    SpO2 98%    BMI 52.76 kg/m2     No exam data present    Constitutional: The patient appears well, NAD, Alert & Oriented x 3  Psych: Normal Mood, Normal Behavior  ENT: DINESH, EOMI, no scleral icterus  Lungs: CTAB,  Normal effort , Good air entry, No W/R/R   Cardiovascular:RRR, No M/R/G, S1 and S2 normal  MSK: Normal ROM, DTRs intact, sensation and strength intact  Extremities: negative LE edema, feet: warm, good capillary refill    Diagnoses and all orders for this visit:    1. Essential hypertension with goal blood pressure less than 140/90  -     lisinopril (PRINIVIL, ZESTRIL) 2.5 mg tablet; Take 1 Tab by mouth daily. -     carvedilol (COREG) 6.25 mg tablet; Take 1 Tab by mouth two (2) times daily (with meals).     2. Vitamin D deficiency  - VITAMIN D2 50,000 unit capsule; take 1 capsule by mouth every week  -     vitamin E (AQUA GEMS) 400 unit capsule; Take 1 Cap by mouth daily. 3. Bilateral low back pain without sciatica, unspecified chronicity  -     cyclobenzaprine (FLEXERIL) 10 mg tablet; Take 1 Tab by mouth nightly. 4. Right-sided thoracic back pain, unspecified chronicity  -     cyclobenzaprine (FLEXERIL) 10 mg tablet; Take 1 Tab by mouth nightly. 5. Seborrheic dermatitis of scalp  -     selenium sulfide 2.25 % sham; Use twice a week        I have discussed the diagnosis with the patient and the intended plan as seen in the above orders. The patient has received an after-visit summary and questions were answered concerning future plans. I have discussed medication side effects and warnings with the patient as well. I have reviewed the plan of care with the patient, accepted their input and they are in agreement with the treatment goals. Patient verbalizes understanding. Follow-up Disposition:  Return if symptoms worsen or fail to improve.

## 2017-09-07 ENCOUNTER — OFFICE VISIT (OUTPATIENT)
Dept: CARDIOLOGY CLINIC | Age: 22
End: 2017-09-07

## 2017-09-07 VITALS
DIASTOLIC BLOOD PRESSURE: 63 MMHG | WEIGHT: 315 LBS | HEART RATE: 82 BPM | HEIGHT: 68 IN | BODY MASS INDEX: 47.74 KG/M2 | SYSTOLIC BLOOD PRESSURE: 114 MMHG

## 2017-09-07 DIAGNOSIS — I10 ESSENTIAL HYPERTENSION WITH GOAL BLOOD PRESSURE LESS THAN 140/90: Primary | ICD-10-CM

## 2017-09-07 DIAGNOSIS — R06.02 SHORTNESS OF BREATH: ICD-10-CM

## 2017-09-07 DIAGNOSIS — I42.9 CARDIOMYOPATHY, UNSPECIFIED TYPE (HCC): ICD-10-CM

## 2017-09-07 DIAGNOSIS — E66.01 MORBID OBESITY, UNSPECIFIED OBESITY TYPE (HCC): ICD-10-CM

## 2017-09-07 NOTE — PROGRESS NOTES
HISTORY OF PRESENT ILLNESS  Florentino Bass is a 20 y.o. male. Cardiomyopathy   The history is provided by the patient. This is a chronic problem. The problem has been resolved. Associated symptoms include shortness of breath. Pertinent negatives include no chest pain, no abdominal pain and no headaches. Hypertension   The history is provided by the patient. This is a chronic problem. The problem has not changed since onset. Associated symptoms include shortness of breath. Pertinent negatives include no chest pain, no abdominal pain and no headaches. Shortness of Breath   The history is provided by the patient. This is a recurrent problem. The problem occurs intermittently. The problem has been rapidly improving. Pertinent negatives include no fever, no headaches, no cough, no sputum production, no hemoptysis, no wheezing, no PND, no orthopnea, no chest pain, no vomiting, no abdominal pain, no rash, no leg swelling and no claudication. Review of Systems   Constitutional: Negative for chills and fever. HENT: Negative for nosebleeds. Eyes: Negative for blurred vision and double vision. Respiratory: Positive for shortness of breath. Negative for cough, hemoptysis, sputum production and wheezing. Cardiovascular: Negative for chest pain, palpitations, orthopnea, claudication, leg swelling and PND. Gastrointestinal: Negative for abdominal pain, heartburn, nausea and vomiting. Musculoskeletal: Negative for myalgias. Skin: Negative for rash. Neurological: Negative for dizziness, weakness and headaches. Endo/Heme/Allergies: Does not bruise/bleed easily.      Family History   Problem Relation Age of Onset    Diabetes Mother     Hypertension Mother     Heart Attack Father     Hypertension Sister     Cancer Maternal Aunt     Stroke Maternal Grandmother     Stroke Maternal Grandfather     Heart Surgery Neg Hx        Past Medical History:   Diagnosis Date    Asthma 5/5/2014    possible asthma r/o cardiac etiology h/o chest trauma as a child     Other specified cardiac dysrhythmias 5/5/2014    marked sinus bradycardia     Palpitations 5/5/2014    r/o arrythmia     Shortness of breath 5/5/2014    possible asthma r/o cardiac etiology h/o chest trauma as a child        Past Surgical History:   Procedure Laterality Date    HX ADENOIDECTOMY         Social History   Substance Use Topics    Smoking status: Never Smoker    Smokeless tobacco: Never Used    Alcohol use No       Allergies   Allergen Reactions    Penicillins Unable to Obtain       Outpatient Prescriptions Marked as Taking for the 9/7/17 encounter (Office Visit) with Jagjit Fisher MD   Medication Sig Dispense Refill    VITAMIN D2 50,000 unit capsule take 1 capsule by mouth every week 12 Cap 0    cyclobenzaprine (FLEXERIL) 10 mg tablet Take 1 Tab by mouth nightly. 30 Tab 0    lisinopril (PRINIVIL, ZESTRIL) 2.5 mg tablet Take 1 Tab by mouth daily. 30 Tab 6    vitamin E (AQUA GEMS) 400 unit capsule Take 1 Cap by mouth daily. 180 Cap 3    carvedilol (COREG) 6.25 mg tablet Take 1 Tab by mouth two (2) times daily (with meals). 60 Tab 6    selenium sulfide 2.25 % sham Use twice a week 180 mL 3    tiotropium bromide (SPIRIVA RESPIMAT) 1.25 mcg/actuation inhaler Take 2 Puffs by inhalation daily.  gemfibrozil (LOPID) 600 mg tablet Take 600 mg by mouth two (2) times a day.  aspirin delayed-release 81 mg tablet Take  by mouth daily.  naproxen (NAPROSYN) 500 mg tablet Take 500 mg by mouth as needed.  albuterol (VENTOLIN HFA) 90 mcg/actuation inhaler Take  by inhalation as needed. Visit Vitals    /63    Pulse 82    Ht 5' 8\" (1.727 m)    Wt 157.9 kg (348 lb)    BMI 52.91 kg/m2         Physical Exam   Constitutional: He is oriented to person, place, and time. He appears well-developed and well-nourished. HENT:   Head: Normocephalic and atraumatic. Eyes: Conjunctivae are normal.   Neck: Neck supple.  No JVD present. No tracheal deviation present. No thyromegaly present. Cardiovascular: Normal rate, regular rhythm and normal heart sounds. Exam reveals no gallop and no friction rub. No murmur heard. Pulmonary/Chest: Breath sounds normal. No respiratory distress. He has no wheezes. He has no rales. He exhibits no tenderness. Abdominal: Soft. There is no tenderness. Musculoskeletal: He exhibits no edema. Neurological: He is alert and oriented to person, place, and time. Skin: Skin is warm and dry. Psychiatric: He has a normal mood and affect. Mr. Jas Aranda has a reminder for a \"due or due soon\" health maintenance. I have asked that he contact his primary care provider for follow-up on this health maintenance. No flowsheet data found. I have personally reviewed patient's records available from hospital and other providers and incorporated findings in patient care. SUMMARY:echo:12/2015  Left ventricle: Systolic function was normal. Ejection fraction was  estimated to be 55 %. There were no regional wall motion abnormalities. Left ventricular diastolic function parameters were normal.    COMPARISONS:  Comparison was made with the previous study of 02-Jun-2015. LV overall  function has increased from 45 % to 55 %    Assessment         ICD-10-CM ICD-9-CM    1. Essential hypertension with goal blood pressure less than 140/90 I10 401.9     controlled   2. Cardiomyopathy, unspecified type ([facility] I42.9 425.4     improving ef   3. Shortness of breath R06.02 786.05     exertional  improving   4. Morbid obesity, unspecified obesity type ([facility] E66.01 278.01     discussed  diet and exercise         No orders of the defined types were placed in this encounter. Follow-up Disposition:  Return in about 6 months (around 3/7/2018).

## 2017-09-07 NOTE — MR AVS SNAPSHOT
Visit Information Date & Time Provider Department Dept. Phone Encounter #  
 9/7/2017 10:00 AM Hanna Messina MD [facility] [id number] Follow-up Instructions Return in about 6 months (around 3/7/2018). Your Appointments 10/2/2017  9:05 AM  
New Patient with Chandler Corona MD  
[address] [facility] Appt Note: bilat arm/hand pain, nx, adv HS office, ref'd by Hanane Lai (current pt) [address], [address] [facility] [address] [phone]  
  
   
 [address], [address] [facility] [address]  
  
    
 10/3/2017  9:45 AM  
ROUTINE CARE with Juan Oropeza MD  
[facility] ([facility]) Appt Note: 3 mos follow up, HTN, Asthma, obesity [address] [facility]  
[phone]  
  
   
 [address] [facility]  
  
    
 3/8/2018  9:15 AM  
ESTABLISHED PATIENT with Hanna Messina MD  
[facility]) Appt Note: 6 months [address], [address] [facility] [address]  
[address] [facility] [address] [place] [address] Upcoming Health Maintenance Date Due Pneumococcal 19-64 Medium Risk (1 of 1 - PPSV23) 4/4/2013 DTaP/Tdap/Td series (2 - Td) 1/1/2015 INFLUENZA AGE 9 TO ADULT 8/1/2017 Allergies as of 9/7/2017  Review Complete On: 9/7/2017 By: Hanna Messina MD  
  
 Severity Noted Reaction Type Reactions Penicillins High 05/05/2014    Unable to Obtain Current Immunizations  Never Reviewed No immunizations on file. Not reviewed this visit You Were Diagnosed With   
  
 Codes Comments Essential hypertension with goal blood pressure less than 140/90    -  Primary ICD-10-CM: I10 
ICD-9-CM: 401.9 controlled Cardiomyopathy, unspecified type ([facility] [facility]     ICD-10-CM: I42.9 ICD-9-CM: 425.4 improving ef Shortness of breath     ICD-10-CM: R06.02 
ICD-9-CM: 786.05 exertional 
improving Morbid obesity, unspecified obesity type ([facility]     ICD-10-CM: E66.01 
ICD-9-CM: 278.01 discussed  diet and exercise Vitals BP Pulse Height(growth percentile) Weight(growth percentile) BMI Smoking Status 114/63 82 5' 8\" (1.727 m) 348 lb (157.9 kg) 52.91 kg/m2 Never Smoker Vitals History BMI and BSA Data Body Mass Index Body Surface Area 52.91 kg/m 2 2.75 m 2 Preferred Pharmacy Pharmacy Name Phone [address], [address] [place] [address] [phone] Your Updated Medication List  
  
   
This list is accurate as of: 9/7/17 10:31 AM.  Always use your most recent med list.  
  
  
  
  
 aspirin delayed-release 81 mg tablet Take  by mouth daily. carvedilol 6.25 mg tablet Commonly known as:  Carrolyn Peabody Take 1 Tab by mouth two (2) times daily (with meals). cyclobenzaprine 10 mg tablet Commonly known as:  FLEXERIL Take 1 Tab by mouth nightly. lisinopril 2.5 mg tablet Commonly known as:  Burma Fairy Take 1 Tab by mouth daily. LOPID 600 mg tablet Generic drug:  gemfibrozil Take 600 mg by mouth two (2) times a day. naproxen 500 mg tablet Commonly known as:  NAPROSYN Take 500 mg by mouth as needed. selenium sulfide 2.25 % Sham  
Use twice a week SPIRIVA RESPIMAT 1.25 mcg/actuation inhaler Generic drug:  tiotropium bromide Take 2 Puffs by inhalation daily. VENTOLIN HFA 90 mcg/actuation inhaler Generic drug:  albuterol Take  by inhalation as needed. VITAMIN D2 50,000 unit capsule Generic drug:  ergocalciferol  
take 1 capsule by mouth every week  
  
 vitamin E 400 unit capsule Commonly known as:  [address] Take 1 Cap by mouth daily. Follow-up Instructions Return in about 6 months (around 3/7/2018). Introducing [facility]! [employer] introduces [employer] patient portal. Now you can access parts of your medical record, email your doctor's office, and request medication refills online. 1. In your internet browser, go to [email or web address] 2. Click on the First Time User? Click Here link in the Sign In box. You will see the New Member Sign Up page. 3. Enter your [employer] Access Code exactly as it appears below. You will not need to use this code after youve completed the sign-up process. If you do not sign up before the expiration date, you must request a new code. · [employer] Access Code: [email or web address] Expires: 10/19/2017 10:05 AM 
 
4. Enter the last four digits of your Social Security Number (xxxx) and Date of Birth (mm/dd/yyyy) as indicated and click Submit. You will be taken to the next sign-up page. 5. Create a [employer] ID. This will be your [employer] login ID and cannot be changed, so think of one that is secure and easy to remember. 6. Create a [employer] password. You can change your password at any time. 7. Enter your Password Reset Question and Answer. This can be used at a later time if you forget your password. 8. Enter your e-mail address. You will receive e-mail notification when new information is available in [address] 9. Click Sign Up. You can now view and download portions of your medical record. 10. Click the Download Summary menu link to download a portable copy of your medical information. If you have questions, please visit the Frequently Asked Questions section of the [employer] website. Remember, [employer] is NOT to be used for urgent needs. For medical emergencies, dial 911. Now available from your iPhone and Android! Please provide this summary of care documentation to your next provider. Your primary care clinician is listed as Shraddha Francis. If you have any questions after today's visit, please call [phone].

## 2017-09-07 NOTE — LETTER
Yasmin Yee 1995 9/7/2017 Dear Trudy Dean MD 
 
I had the pleasure of evaluating  Mr. Farzana Alegria in office today. Below are the relevant portions of my assessment and plan of care. ICD-10-CM ICD-9-CM 1. Essential hypertension with goal blood pressure less than 140/90 I10 401.9   
 controlled 2. Cardiomyopathy, unspecified type ([facility] I42.9 425.4   
 improving ef  
3. Shortness of breath R06.02 786.05   
 exertional 
improving 4. Morbid obesity, unspecified obesity type ([facility] [address] E66.01 278.01   
 discussed  diet and exercise Current Outpatient Prescriptions Medication Sig Dispense Refill  VITAMIN D2 50,000 unit capsule take 1 capsule by mouth every week 12 Cap 0  cyclobenzaprine (FLEXERIL) 10 mg tablet Take 1 Tab by mouth nightly. 30 Tab 0  
 lisinopril (PRINIVIL, ZESTRIL) 2.5 mg tablet Take 1 Tab by mouth daily. 30 Tab 6  
 vitamin E (AQUA GEMS) 400 unit capsule Take 1 Cap by mouth daily. 180 Cap 3  carvedilol (COREG) 6.25 mg tablet Take 1 Tab by mouth two (2) times daily (with meals). 60 Tab 6  
 selenium sulfide 2.25 % sham Use twice a week 180 mL 3  
 tiotropium bromide (SPIRIVA RESPIMAT) 1.25 mcg/actuation inhaler Take 2 Puffs by inhalation daily.  gemfibrozil (LOPID) 600 mg tablet Take 600 mg by mouth two (2) times a day.  aspirin delayed-release 81 mg tablet Take  by mouth daily.  naproxen (NAPROSYN) 500 mg tablet Take 500 mg by mouth as needed.  albuterol (VENTOLIN HFA) 90 mcg/actuation inhaler Take  by inhalation as needed. No orders of the defined types were placed in this encounter. If you have questions, please do not hesitate to call me. I look forward to following Mr. Farzana Alegria along with you. Sincerely, Kelley Conner MD

## 2017-10-02 ENCOUNTER — OFFICE VISIT (OUTPATIENT)
Dept: ORTHOPEDIC SURGERY | Facility: CLINIC | Age: 22
End: 2017-10-02

## 2017-10-02 VITALS
OXYGEN SATURATION: 99 % | BODY MASS INDEX: 47.74 KG/M2 | WEIGHT: 315 LBS | HEART RATE: 75 BPM | DIASTOLIC BLOOD PRESSURE: 65 MMHG | SYSTOLIC BLOOD PRESSURE: 129 MMHG | TEMPERATURE: 97 F | HEIGHT: 68 IN | RESPIRATION RATE: 18 BRPM

## 2017-10-02 DIAGNOSIS — M75.51 BILATERAL SHOULDER BURSITIS: Primary | ICD-10-CM

## 2017-10-02 DIAGNOSIS — M75.52 BILATERAL SHOULDER BURSITIS: Primary | ICD-10-CM

## 2017-10-02 DIAGNOSIS — M25.512 BILATERAL SHOULDER PAIN, UNSPECIFIED CHRONICITY: ICD-10-CM

## 2017-10-02 DIAGNOSIS — M25.511 BILATERAL SHOULDER PAIN, UNSPECIFIED CHRONICITY: ICD-10-CM

## 2017-10-02 RX ORDER — BETAMETHASONE SODIUM PHOSPHATE AND BETAMETHASONE ACETATE 3; 3 MG/ML; MG/ML
6 INJECTION, SUSPENSION INTRA-ARTICULAR; INTRALESIONAL; INTRAMUSCULAR; SOFT TISSUE ONCE
Qty: 1 ML | Refills: 0
Start: 2017-10-02 | End: 2017-10-02

## 2017-10-02 RX ORDER — BUPIVACAINE HYDROCHLORIDE 2.5 MG/ML
8 INJECTION, SOLUTION EPIDURAL; INFILTRATION; INTRACAUDAL ONCE
Qty: 8 ML | Refills: 0
Start: 2017-10-02 | End: 2017-10-02

## 2017-10-02 NOTE — PROGRESS NOTES
Patient: Corey Echevarria                MRN: [id number]       SSN: [id number]  YOB: 1995        AGE: 20 y.o. SEX: male    PCP: Radha Roy MD  10/02/17    Chief Complaint   Patient presents with    Arm Pain     Salvador    Neck Pain     HISTORY:  Corey Echevarria is a 20 y.o. male who is seen for bilateral shoulder L>R and neck pain. He reports pain radiating from his neck through his arm. He states that his shoulders bother him the most. He states he has increased pain after pushing a mower and lifting heavy items. He states he picks up and pushes heavy items such as bins and crates relatively often. He denies any history of injury. He states his neck pain is now tolerable. Pain Assessment  10/2/2017   Location of Pain Arm   Location Modifiers Left;Right   Severity of Pain 5   Quality of Pain Dull;Aching; Sharp   Duration of Pain Persistent   Frequency of Pain Intermittent   Aggravating Factors Bending;Stretching;Straightening   Limiting Behavior Yes   Relieving Factors Rest   Result of Injury No     Occupation, etc:  Mr. Marta Nguyen  receives social security disability benefits. He goes by [employer]. 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 He lives in [place] with his mothers, sister and Tad Poe. He states he spends most of his time helping his mother. He does try to go on a walk every day. Current weight is 353 pounds. He reports he has gained 5 pounds recently. He is 5'8\" tall. He is not diabetic or hypertensive.      No results found for: HBA1C, HGBE8, ENT1PODF, LZU6NIPM, GMS7CUUE  Weight Metrics 10/2/2017 9/7/2017 8/31/2017 7/21/2017 4/20/2017 3/9/2017 7/28/2016   Weight 353 lb 9.6 oz 348 lb 347 lb 345 lb 348 lb 342 lb 349 lb   BMI 53.76 kg/m2 52.91 kg/m2 52.76 kg/m2 52.46 kg/m2 52.91 kg/m2 50.5 kg/m2 51.51 kg/m2       Patient Active Problem List   Diagnosis Code    Palpitations R00.2    Shortness of breath R06.02    Asthma J45.909    Other specified cardiac dysrhythmias(427.89) I49.8    Cardiomyopathy ([facility]) I42.9    Essential hypertension with goal blood pressure less than 140/90 I10    Morbid obesity ([facility]) E66.01     REVIEW OF SYSTEMS: All Below are Negative except: See HPI   Constitutional: negative for fever, chills, and weight loss. Cardiovascular: negative for chest pain, claudication, leg swelling, SOB, [place]   Gastrointestinal: Negative for pain, N/V/C/D, Blood in stool or urine, dysuria,  hematuria, incontinence, pelvic pain. Musculoskeletal: See HPI   Neurological: Negative for dizziness and weakness. Negative for headaches, Visual changes, confusion, seizures   Phychiatric/Behavioral: Negative for depression, memory loss, substance  abuse. Extremities: Negative for hair changes, rash, or skin lesion changes. Hematologic: Negative for bleeding problems, bruising, pallor or swollen lymph  nodes   Peripheral Vascular: No calf pain, no circulation deficits. Social History     Social History    Marital status: SINGLE     Spouse name: N/A    Number of children: N/A    Years of education: N/A     Occupational History    Not on file. Social History Main Topics    Smoking status: Never Smoker    Smokeless tobacco: Never Used    Alcohol use No    Drug use: No    Sexual activity: Not on file     Other Topics Concern    Not on file     Social History Narrative      Allergies   Allergen Reactions    Penicillins Unable to Obtain      Current Outpatient Prescriptions   Medication Sig    VITAMIN D2 50,000 unit capsule take 1 capsule by mouth every week    cyclobenzaprine (FLEXERIL) 10 mg tablet Take 1 Tab by mouth nightly.  lisinopril (PRINIVIL, ZESTRIL) 2.5 mg tablet Take 1 Tab by mouth daily.  vitamin E (AQUA GEMS) 400 unit capsule Take 1 Cap by mouth daily.  carvedilol (COREG) 6.25 mg tablet Take 1 Tab by mouth two (2) times daily (with meals).     selenium sulfide 2.25 % sham Use twice a week    tiotropium bromide (SPIRIVA RESPIMAT) 1.25 mcg/actuation inhaler Take 2 Puffs by inhalation daily.  gemfibrozil (LOPID) 600 mg tablet Take 600 mg by mouth two (2) times a day.  aspirin delayed-release 81 mg tablet Take  by mouth daily.  naproxen (NAPROSYN) 500 mg tablet Take 500 mg by mouth as needed.  albuterol (VENTOLIN HFA) 90 mcg/actuation inhaler Take  by inhalation as needed. No current facility-administered medications for this visit. PHYSICAL EXAMINATION:  Visit Vitals    /65    Pulse 75    Temp 97 °F (36.1 °C) (Oral)    Resp 18    Ht 5' 8\" (1.727 m)    Wt (!) 353 lb 9.6 oz (160.4 kg)    SpO2 99%    BMI 53.76 kg/m2      ORTHO EXAMINATION:  Examination Neck   Skin Intact   Tenderness +   Tightness +   Flexion Decreased 25%   Extension Decreased 25%   Lateral bend left Normal   Lateral bend right Normal   Masses -   Biceps reflex Normal   Triceps reflex Normal   Brachioradialis reflex Normal     Examination Right shoulder Left shoulder   Skin Intact Intact   Effusion - -   Biceps deformity - -   Atrophy - -   AC joint tenderness + +   Acromial tenderness + +   Biceps tenderness - -   Forward flexion/Elevation , with pain 170, with pain   Active abduction  170   External rotation ROM 10, with pain 10   Internal rotation ROM 90, with pain 90   Apprehension - -   Impingement + -   Drop Arm Test - -   Neurovascular Intact Intact     PROCEDURE:  After discussing treatment options, patient's shoulders were injected with 4 cc Marcaine and 1/2 cc Celestone.     Chart reviewed for the following:   Oliva Lundborg, MD, have reviewed the History, Physical and updated the Allergic reactions for [facility] performed immediately prior to start of procedure:  Oliva Lundborg, MD, have performed the following reviews on [facility] prior to the start of the procedure:            * Patient was identified by name and date of birth   * Agreement on procedure being performed was verified  * Risks and Benefits explained to the patient  * Procedure site verified and marked as necessary  * Patient was positioned for comfort  * Consent was obtained     Time: 9:41 AM     Date of procedure: 10/2/2017    Procedure performed by:  Martin Smith MD    Mr. Kevin Roldan tolerated the procedure well with no complications. RADIOGRAPHS:  XR BILATERAL SHOULDERS 10/2/17  IMPRESSION:  Three views - No fractures, no acromioclavicular narrowing, no glenohumeral narrowing, no calcific densities. IMPRESSION:      ICD-10-CM ICD-9-CM    1. Bilateral shoulder bursitis M75.51 726.10 betamethasone (CELESTONE SOLUSPAN) 6 mg/mL injection    M75.52  BETAMETHASONE ACETATE & SODIUM PHOSPHATE INJECTION 3 MG EA.      DRAIN/INJECT LARGE JOINT/BURSA      bupivacaine, PF, (MARCAINE, PF,) 0.25 % (2.5 mg/mL) injection   2. Bilateral shoulder pain, unspecified chronicity M25.511 719.41 AMB POC XRAY, SHOULDER; COMPLETE, 2+    M25.512  AMB POC XRAY, SHOULDER; COMPLETE, 2+      betamethasone (CELESTONE SOLUSPAN) 6 mg/mL injection      BETAMETHASONE ACETATE & SODIUM PHOSPHATE INJECTION 3 MG EA.      DRAIN/INJECT LARGE JOINT/BURSA      bupivacaine, PF, (MARCAINE, PF,) 0.25 % (2.5 mg/mL) injection   3. BMI 50.0-59.9, adult [facility]) Z68.43 V85.43      PLAN:  After discussing treatment options, patient's shoulders were injected with 4 cc Marcaine and 1/2 cc Celestone. He will follow up as needed. Continue weight loss efforts with a low carb diet. He will be referred for bariatric surgery consultation.     Scribed by Wendy Correa ([facility]) as dictated by Martin Smith MD

## 2017-10-02 NOTE — PATIENT INSTRUCTIONS
Shoulder Bursitis: Exercises  Your Care Instructions  Here are some examples of typical rehabilitation exercises for your condition. Start each exercise slowly. Ease off the exercise if you start to have pain. Your doctor or physical therapist will tell you when you can start these exercises and which ones will work best for you. How to do the exercises  Posterior stretching exercise    1. Hold the elbow of your injured arm with your other hand. 2. Use your hand to pull your injured arm gently up and across your body. You will feel a gentle stretch across the back of your injured shoulder. 3. Hold for at least 15 to 30 seconds. Then slowly lower your arm. 4. Repeat 2 to 4 times. Up-the-back stretch    Note: Your doctor or physical therapist may want you to wait to do this stretch until you have regained most of your range of motion and strength. You can do this stretch in different ways. Hold any of these stretches for at least 15 to 30 seconds. Repeat them 2 to 4 times. 1. Put your hand in your back pocket. Let it rest there to stretch your shoulder. 2. With your other hand, hold your injured arm (palm outward) behind your back by the wrist. Pull your arm up gently to stretch your shoulder. 3. Next, put a towel over your other shoulder. Put the hand of your injured arm behind your back. Now hold the back end of the towel. With the other hand, hold the front end of the towel in front of your body. Pull gently on the front end of the towel. This will bring your hand farther up your back to stretch your shoulder. Overhead stretch    1. Standing about an arm's length away, grasp onto a solid surface. You could use a countertop, a doorknob, or the back of a sturdy chair. 2. With your knees slightly bent, bend forward with your arms straight. Lower your upper body, and let your shoulders stretch. 3. As your shoulders are able to stretch farther, you may need to take a step or two backward.   4. Hold for at least 15 to 30 seconds. Then stand up and relax. If you had stepped back during your stretch, step forward so you can keep your hands on the solid surface. 5. Repeat 2 to 4 times. Shoulder flexion (lying down)    Note: To make a wand for this exercise, use a piece of PVC pipe or a broom handle with the broom removed. Make the wand about a foot wider than your shoulders. 1. Lie on your back, holding a wand with both hands. Your palms should face down as you hold the wand. 2. Keeping your elbows straight, slowly raise your arms over your head. Raise them until you feel a stretch in your shoulders, upper back, and chest.  3. Hold for 15 to 30 seconds. 4. Repeat 2 to 4 times. Shoulder rotation (lying down)    Note: To make a wand for this exercise, use a piece of PVC pipe or a broom handle with the broom removed. Make the wand about a foot wider than your shoulders. 1. Lie on your back. Hold a wand with both hands with your elbows bent and palms up. 2. Keep your elbows close to your body, and move the wand across your body toward the sore arm. 3. Hold for 8 to 12 seconds. 4. Repeat 2 to 4 times. Shoulder blade squeeze    1. Stand with your arms at your sides, and squeeze your shoulder blades together. Do not raise your shoulders up as you squeeze. 2. Hold 6 seconds. 3. Repeat 8 to 12 times. Shoulder flexor and extensor exercise    Note: These are isometric exercises. That means you contract your muscles without actually moving. · Push forward (flex): Stand facing a wall or doorjamb, about 6 inches or less back. Hold your injured arm against your body. Make a closed fist with your thumb on top. Then gently push your hand forward into the wall with about 25% to 50% of your strength. Don't let your body move backward as you push. Hold for about 6 seconds. Relax for a few seconds. Repeat 8 to 12 times. · Push backward (extend): Stand with your back flat against a wall.  Your upper arm should be against the wall, with your elbow bent 90 degrees (your hand straight ahead). Push your elbow gently back against the wall with about 25% to 50% of your strength. Don't let your body move forward as you push. Hold for about 6 seconds. Relax for a few seconds. Repeat 8 to 12 times. Scapular exercise: Wall push-ups    Note: This exercise is best done with your fingers somewhat turned out, rather than straight up and down. 1. Stand facing a wall, about 12 inches to 18 inches away. 2. Place your hands on the wall at shoulder height. 3. Slowly bend your elbows and bring your face to the wall. Keep your back and hips straight. 4. Push back to where you started. 5. Repeat 8 to 12 times. 6. When you can do this exercise against a wall comfortably, you can try it against a counter. You can then slowly progress to the end of a couch, then to a sturdy chair, and finally to the floor. Scapular exercise: Retraction    Note: For this exercise, you will need elastic exercise material, such as surgical tubing or Thera-Band. 1. Put the band around a solid object at about waist level. (A bedpost will work well.) Each hand should hold an end of the band. 2. With your elbows at your sides and bent to 90 degrees, pull the band back. Your shoulder blades should move toward each other. Then move your arms back where you started. 3. Repeat 8 to 12 times. 4. If you have good range of motion in your shoulders, try this exercise with your arms lifted out to the sides. Keep your elbows at a 90-degree angle. Raise the elastic band up to about shoulder level. Pull the band back to move your shoulder blades toward each other. Then move your arms back where you started. Internal rotator strengthening exercise    1. Start by tying a piece of elastic exercise material to a doorknob. You can use surgical tubing or Thera-Band. 2. Stand or sit with your shoulder relaxed and your elbow bent 90 degrees.  Your upper arm should rest comfortably against your side. Squeeze a rolled towel between your elbow and your body for comfort. This will help keep your arm at your side. 3. Hold one end of the elastic band in the hand of the painful arm. 4. Slowly rotate your forearm toward your body until it touches your belly. Slowly move it back to where you started. 5. Keep your elbow and upper arm firmly tucked against the towel roll or at your side. 6. Repeat 8 to 12 times. External rotator strengthening exercise    1. Start by tying a piece of elastic exercise material to a doorknob. You can use surgical tubing or Thera-Band. (You may also hold one end of the band in each hand.)  2. Stand or sit with your shoulder relaxed and your elbow bent 90 degrees. Your upper arm should rest comfortably against your side. Squeeze a rolled towel between your elbow and your body for comfort. This will help keep your arm at your side. 3. Hold one end of the elastic band with the hand of the painful arm. 4. Start with your forearm across your belly. Slowly rotate the forearm out away from your body. Keep your elbow and upper arm tucked against the towel roll or the side of your body until you begin to feel tightness in your shoulder. Slowly move your arm back to where you started. 5. Repeat 8 to 12 times. Follow-up care is a key part of your treatment and safety. Be sure to make and go to all appointments, and call your doctor if you are having problems. It's also a good idea to know your test results and keep a list of the medicines you take. Where can you learn more? Go to http://leandra-giovanni.info/. Enter S784 in the search box to learn more about \"Shoulder Bursitis: Exercises. \"  Current as of: March 21, 2017  Content Version: 11.3  © 1328-9363 [employer]. Care instructions adapted under license by [employer] (which disclaims liability or warranty for this information).  If you have questions about a medical condition or this instruction, always ask your healthcare professional. [address] any warranty or liability for your use of this information. Joint Injections: Care Instructions  Your Care Instructions  Joint injections are shots into a joint, such as the knee. They may be used to put in medicines, such as pain relievers. Or they can be used to take out fluid. Sometimes the fluid is tested in a lab. This can help find the cause of a joint problem. A corticosteroid, or steroid, shot is used to reduce inflammation in tendons or joints. It is often used to treat problems such as arthritis, tendinitis, and bursitis. Steroids can be injected directly into a painful, inflamed joint. They can also help reduce inflammation of a bursa. A bursa is a sac of fluid. It cushions and lubricates areas where tendons, ligaments, skin, muscles, or bones rub against each other. A steroid shot can sometimes help with short-term pain relief when other treatments haven't worked. If steroid shots help, pain may improve for weeks or months. Follow-up care is a key part of your treatment and safety. Be sure to make and go to all appointments, and call your doctor if you are having problems. It's also a good idea to know your test results and keep a list of the medicines you take. How can you care for yourself at home? · Put ice or a cold pack on the area for 10 to 20 minutes at a time. Put a thin cloth between the ice and your skin. · Take anti-inflammatory medicines to reduce pain, swelling, or inflammation. These include ibuprofen (Advil, Motrin) and naproxen (Aleve). Read and follow all instructions on the label. · Avoid strenuous activities for several days, especially those that put stress on the area where you got the shot. · If you have dressings over the area, keep them clean and dry. You may remove them when your doctor tells you to. When should you call for help?   Call your doctor now or seek immediate medical care if:  · You have signs of infection, such as:  ¨ Increased pain, swelling, warmth, or redness. ¨ Red streaks leading from the site. ¨ Pus draining from the site. ¨ A fever. Watch closely for changes in your health, and be sure to contact your doctor if you have any problems. Where can you learn more? Go to http://leandra-giovanni.info/. Enter N616 in the search box to learn more about \"Joint Injections: Care Instructions. \"  Current as of: March 21, 2017  Content Version: 11.3  © 3450-1623 [employer]. Care instructions adapted under license by [employer] (which disclaims liability or warranty for this information). If you have questions about a medical condition or this instruction, always ask your healthcare professional. [address] any warranty or liability for your use of this information.

## 2017-10-02 NOTE — MR AVS SNAPSHOT
Visit Information Date & Time Provider Department Dept. Phone Encounter #  
 10/2/2017  9:05 AM Martin Smith, 27 [facility] [phone] [id number] Follow-up Instructions Return if symptoms worsen or fail to improve. Your Appointments 10/3/2017  9:45 AM  
ROUTINE CARE with Silvano Barnard MD  
[facility] ([address] [facility]) Appt Note: 3 mos follow up, HTN, Asthma, obesity 14 [facility] [address] [facility]  
[phone]  
  
   
 14 [address]  
  
    
 3/8/2018  9:15 AM  
ESTABLISHED PATIENT with Yinka Hernandez MD  
[facility]) Appt Note: 6 months [address] [facility], [address] [facility] [address]  
1338 [facility], [address] [place] [address] [address] [place] [address] Upcoming Health Maintenance Date Due Pneumococcal 19-64 Medium Risk (1 of 1 - PPSV23) 4/4/2013 DTaP/Tdap/Td series (2 - Td) 1/1/2015 INFLUENZA AGE 9 TO ADULT 8/1/2017 Allergies as of 10/2/2017  Review Complete On: 10/2/2017 By: Martin Smith MD  
  
 Severity Noted Reaction Type Reactions Penicillins High 05/05/2014    Unable to Obtain Current Immunizations  Never Reviewed No immunizations on file. Not reviewed this visit You Were Diagnosed With   
  
 Codes Comments Bilateral shoulder bursitis    -  Primary ICD-10-CM: M75.51, M75.52 
ICD-9-CM: 726.10 Bilateral shoulder pain, unspecified chronicity     ICD-10-CM: M25.511, M25.512 ICD-9-CM: 719.41   
 BMI 50.0-59.9, adult [facility])     ICD-10-CM: U67.00 
ICD-9-CM: V85.43 Vitals BP Pulse Temp Resp Height(growth percentile) Weight(growth percentile) 129/65 75 97 °F (36.1 °C) (Oral) 18 5' 8\" (1.727 m) (!) 353 lb 9.6 oz (160.4 kg) SpO2 BMI Smoking Status 99% 53.76 kg/m2 Never Smoker BMI and BSA Data Body Mass Index Body Surface Area 53.76 kg/m 2 2.77 m 2 Preferred Pharmacy Pharmacy Name Phone [address] [place], [address] [facility] [phone] Your Updated Medication List  
  
   
This list is accurate as of: 10/2/17 10:12 AM.  Always use your most recent med list.  
  
  
  
  
 aspirin delayed-release 81 mg tablet Take  by mouth daily. betamethasone 6 mg/mL injection Commonly known as:  CELESTONE SOLUSPAN  
1 mL by Intra artICUlar route once for 1 dose. bupivacaine (PF) 0.25 % (2.5 mg/mL) injection Commonly known as:  MARCAINE (PF)  
8 mL by Intra artICUlar route once for 1 dose. carvedilol 6.25 mg tablet Commonly known as:  Cleatis Sreedhar Take 1 Tab by mouth two (2) times daily (with meals). cyclobenzaprine 10 mg tablet Commonly known as:  FLEXERIL Take 1 Tab by mouth nightly. lisinopril 2.5 mg tablet Commonly known as:  Grover Escort Take 1 Tab by mouth daily. LOPID 600 mg tablet Generic drug:  gemfibrozil Take 600 mg by mouth two (2) times a day. naproxen 500 mg tablet Commonly known as:  NAPROSYN Take 500 mg by mouth as needed. selenium sulfide 2.25 % Sham  
Use twice a week SPIRIVA RESPIMAT 1.25 mcg/actuation inhaler Generic drug:  tiotropium bromide Take 2 Puffs by inhalation daily. VENTOLIN HFA 90 mcg/actuation inhaler Generic drug:  albuterol Take  by inhalation as needed. VITAMIN D2 50,000 unit capsule Generic drug:  ergocalciferol  
take 1 capsule by mouth every week  
  
 vitamin E 400 unit capsule Commonly known as:  [address] Take 1 Cap by mouth daily. We Performed the Following AMB POC XRAY, SHOULDER; COMPLETE, 2+ [14008 CPT(R)] AMB POC XRAY, SHOULDER; COMPLETE, 2+ [57715 CPT(R)] BETAMETHASONE ACETATE & SODIUM PHOSPHATE INJECTION 3 MG EA. [ [facility]] DRAIN/INJECT LARGE JOINT/BURSA [id number] CPT(R)] Follow-up Instructions Return if symptoms worsen or fail to improve. Patient Instructions Shoulder Bursitis: Exercises Your Care Instructions Here are some examples of typical rehabilitation exercises for your condition. Start each exercise slowly. Ease off the exercise if you start to have pain. Your doctor or physical therapist will tell you when you can start these exercises and which ones will work best for you. How to do the exercises Posterior stretching exercise 1. Hold the elbow of your injured arm with your other hand. 2. Use your hand to pull your injured arm gently up and across your body. You will feel a gentle stretch across the back of your injured shoulder. 3. Hold for at least 15 to 30 seconds. Then slowly lower your arm. 4. Repeat 2 to 4 times. Up-the-back stretch Note: Your doctor or physical therapist may want you to wait to do this stretch until you have regained most of your range of motion and strength. You can do this stretch in different ways. Hold any of these stretches for at least 15 to 30 seconds. Repeat them 2 to 4 times. 1. Put your hand in your back pocket. Let it rest there to stretch your shoulder. 2. With your other hand, hold your injured arm (palm outward) behind your back by the wrist. Pull your arm up gently to stretch your shoulder. 3. Next, put a towel over your other shoulder. Put the hand of your injured arm behind your back. Now hold the back end of the towel. With the other hand, hold the front end of the towel in front of your body. Pull gently on the front end of the towel. This will bring your hand farther up your back to stretch your shoulder. Overhead stretch 1. Standing about an arm's length away, grasp onto a solid surface. You could use a countertop, a doorknob, or the back of a sturdy chair. 2. With your knees slightly bent, bend forward with your arms straight. Lower your upper body, and let your shoulders stretch. 3. As your shoulders are able to stretch farther, you may need to take a step or two backward. 4. Hold for at least 15 to 30 seconds. Then stand up and relax. If you had stepped back during your stretch, step forward so you can keep your hands on the solid surface. 5. Repeat 2 to 4 times. Shoulder flexion (lying down) Note: To make a wand for this exercise, use a piece of PVC pipe or a broom handle with the broom removed. Make the wand about a foot wider than your shoulders. 1. Lie on your back, holding a wand with both hands. Your palms should face down as you hold the wand. 2. Keeping your elbows straight, slowly raise your arms over your head. Raise them until you feel a stretch in your shoulders, upper back, and chest. 
3. Hold for 15 to 30 seconds. 4. Repeat 2 to 4 times. Shoulder rotation (lying down) Note: To make a wand for this exercise, use a piece of PVC pipe or a broom handle with the broom removed. Make the wand about a foot wider than your shoulders. 1. Lie on your back. Hold a wand with both hands with your elbows bent and palms up. 2. Keep your elbows close to your body, and move the wand across your body toward the sore arm. 3. Hold for 8 to 12 seconds. 4. Repeat 2 to 4 times. Shoulder blade squeeze 1. Stand with your arms at your sides, and squeeze your shoulder blades together. Do not raise your shoulders up as you squeeze. 2. Hold 6 seconds. 3. Repeat 8 to 12 times. Shoulder flexor and extensor exercise Note: These are isometric exercises. That means you contract your muscles without actually moving. · Push forward (flex): Stand facing a wall or doorjamb, about 6 inches or less back. Hold your injured arm against your body. Make a closed fist with your thumb on top. Then gently push your hand forward into the wall with about 25% to 50% of your strength. Don't let your body move backward as you push. Hold for about 6 seconds. Relax for a few seconds.  Repeat 8 to 12 times. · Push backward (extend): Stand with your back flat against a wall. Your upper arm should be against the wall, with your elbow bent 90 degrees (your hand straight ahead). Push your elbow gently back against the wall with about 25% to 50% of your strength. Don't let your body move forward as you push. Hold for about 6 seconds. Relax for a few seconds. Repeat 8 to 12 times. Scapular exercise: Wall push-ups Note: This exercise is best done with your fingers somewhat turned out, rather than straight up and down. 1. Stand facing a wall, about 12 inches to 18 inches away. 2. Place your hands on the wall at shoulder height. 3. Slowly bend your elbows and bring your face to the wall. Keep your back and hips straight. 4. Push back to where you started. 5. Repeat 8 to 12 times. 6. When you can do this exercise against a wall comfortably, you can try it against a counter. You can then slowly progress to the end of a couch, then to a sturdy chair, and finally to the floor. Scapular exercise: Retraction Note: For this exercise, you will need elastic exercise material, such as surgical tubing or Thera-Band. 1. Put the band around a solid object at about waist level. (A bedpost will work well.) Each hand should hold an end of the band. 2. With your elbows at your sides and bent to 90 degrees, pull the band back. Your shoulder blades should move toward each other. Then move your arms back where you started. 3. Repeat 8 to 12 times. 4. If you have good range of motion in your shoulders, try this exercise with your arms lifted out to the sides. Keep your elbows at a 90-degree angle. Raise the elastic band up to about shoulder level. Pull the band back to move your shoulder blades toward each other. Then move your arms back where you started. Internal rotator strengthening exercise 1. Start by tying a piece of elastic exercise material to a doorknob. You can use surgical tubing or Thera-Band. 2. Stand or sit with your shoulder relaxed and your elbow bent 90 degrees. Your upper arm should rest comfortably against your side. Squeeze a rolled towel between your elbow and your body for comfort. This will help keep your arm at your side. 3. Hold one end of the elastic band in the hand of the painful arm. 4. Slowly rotate your forearm toward your body until it touches your belly. Slowly move it back to where you started. 5. Keep your elbow and upper arm firmly tucked against the towel roll or at your side. 6. Repeat 8 to 12 times. External rotator strengthening exercise 1. Start by tying a piece of elastic exercise material to a doorknob. You can use surgical tubing or Thera-Band. (You may also hold one end of the band in each hand.) 2. Stand or sit with your shoulder relaxed and your elbow bent 90 degrees. Your upper arm should rest comfortably against your side. Squeeze a rolled towel between your elbow and your body for comfort. This will help keep your arm at your side. 3. Hold one end of the elastic band with the hand of the painful arm. 4. Start with your forearm across your belly. Slowly rotate the forearm out away from your body. Keep your elbow and upper arm tucked against the towel roll or the side of your body until you begin to feel tightness in your shoulder. Slowly move your arm back to where you started. 5. Repeat 8 to 12 times. Follow-up care is a key part of your treatment and safety. Be sure to make and go to all appointments, and call your doctor if you are having problems. It's also a good idea to know your test results and keep a list of the medicines you take. Where can you learn more? Go to http://leandra-giovanni.info/. Enter E141 in the search box to learn more about \"Shoulder Bursitis: Exercises. \" Current as of: March 21, 2017 Content Version: 11.3 © 9288-8184 [employer].  Care instructions adapted under license by [address] (which disclaims liability or warranty for this information). If you have questions about a medical condition or this instruction, always ask your healthcare professional. [address] any warranty or liability for your use of this information. Joint Injections: Care Instructions Your Care Instructions Joint injections are shots into a joint, such as the knee. They may be used to put in medicines, such as pain relievers. Or they can be used to take out fluid. Sometimes the fluid is tested in a lab. This can help find the cause of a joint problem. A corticosteroid, or steroid, shot is used to reduce inflammation in tendons or joints. It is often used to treat problems such as arthritis, tendinitis, and bursitis. Steroids can be injected directly into a painful, inflamed joint. They can also help reduce inflammation of a bursa. A bursa is a sac of fluid. It cushions and lubricates areas where tendons, ligaments, skin, muscles, or bones rub against each other. A steroid shot can sometimes help with short-term pain relief when other treatments haven't worked. If steroid shots help, pain may improve for weeks or months. Follow-up care is a key part of your treatment and safety. Be sure to make and go to all appointments, and call your doctor if you are having problems. It's also a good idea to know your test results and keep a list of the medicines you take. How can you care for yourself at home? · Put ice or a cold pack on the area for 10 to 20 minutes at a time. Put a thin cloth between the ice and your skin. · Take anti-inflammatory medicines to reduce pain, swelling, or inflammation. These include ibuprofen (Advil, Motrin) and naproxen (Aleve). Read and follow all instructions on the label. · Avoid strenuous activities for several days, especially those that put stress on the area where you got the shot. · If you have dressings over the area, keep them clean and dry. You may remove them when your doctor tells you to. When should you call for help? Call your doctor now or seek immediate medical care if: 
· You have signs of infection, such as: 
¨ Increased pain, swelling, warmth, or redness. ¨ Red streaks leading from the site. ¨ Pus draining from the site. ¨ A fever. Watch closely for changes in your health, and be sure to contact your doctor if you have any problems. Where can you learn more? Go to http://leandra-giovanni.info/. Enter N616 in the search box to learn more about \"Joint Injections: Care Instructions. \" Current as of: March 21, 2017 Content Version: 11.3 © 4313-0480 [employer]. Care instructions adapted under license by [employer] (which disclaims liability or warranty for this information). If you have questions about a medical condition or this instruction, always ask your healthcare professional. [place] [address] any warranty or liability for your use of this information. Introducing [facility]! [facility] introduces Spe[employer] patient portal. Now you can access parts of your medical record, email your doctor's office, and request medication refills online. 1. In your internet browser, go to https://[facility]. [employer]/[facility] 2. Click on the First Time User? Click Here link in the Sign In box. You will see the New Member Sign Up page. 3. Enter your Speedyboy Access Code exactly as it appears below. You will not need to use this code after youve completed the sign-up process. If you do not sign up before the expiration date, you must request a new code. · Speedyboy Access Code: [email or web address] Expires: 10/19/2017 10:05 AM 
 
4. Enter the last four digits of your Social Security Number (xxxx) and Date of Birth (mm/dd/yyyy) as indicated and click Submit. You will be taken to the next sign-up page. 5. Create a [employer] ID. This will be your [employer] login ID and cannot be changed, so think of one that is secure and easy to remember. 6. Create a [employer] password. You can change your password at any time. 7. Enter your Password Reset Question and Answer. This can be used at a later time if you forget your password. 8. Enter your e-mail address. You will receive e-mail notification when new information is available in [address] 9. Click Sign Up. You can now view and download portions of your medical record. 10. Click the Download Summary menu link to download a portable copy of your medical information. If you have questions, please visit the Frequently Asked Questions section of the [employer] website. Remember, [employer] is NOT to be used for urgent needs. For medical emergencies, dial 911. Now available from your iPhone and Android! Please provide this summary of care documentation to your next provider. Your primary care clinician is listed as Eulogio Aponte. If you have any questions after today's visit, please call [phone].

## 2017-12-11 RX ORDER — TRAMADOL HYDROCHLORIDE 50 MG/1
50 TABLET ORAL
Qty: 14 TAB | Refills: 0 | Status: SHIPPED | OUTPATIENT
Start: 2017-12-11 | End: 2018-02-05

## 2017-12-18 ENCOUNTER — OFFICE VISIT (OUTPATIENT)
Dept: ORTHOPEDIC SURGERY | Facility: CLINIC | Age: 22
End: 2017-12-18

## 2017-12-18 VITALS
OXYGEN SATURATION: 99 % | WEIGHT: 315 LBS | HEART RATE: 89 BPM | BODY MASS INDEX: 46.65 KG/M2 | SYSTOLIC BLOOD PRESSURE: 121 MMHG | DIASTOLIC BLOOD PRESSURE: 61 MMHG | HEIGHT: 69 IN

## 2017-12-18 DIAGNOSIS — M65.4 DE QUERVAIN'S TENOSYNOVITIS, LEFT: Primary | ICD-10-CM

## 2017-12-18 DIAGNOSIS — M25.532 LEFT WRIST PAIN: ICD-10-CM

## 2017-12-18 RX ORDER — BETAMETHASONE SODIUM PHOSPHATE AND BETAMETHASONE ACETATE 3; 3 MG/ML; MG/ML
6 INJECTION, SUSPENSION INTRA-ARTICULAR; INTRALESIONAL; INTRAMUSCULAR; SOFT TISSUE ONCE
Qty: 0.5 ML | Refills: 0
Start: 2017-12-18 | End: 2017-12-18

## 2017-12-18 RX ORDER — BUPIVACAINE HYDROCHLORIDE 2.5 MG/ML
0.5 INJECTION, SOLUTION EPIDURAL; INFILTRATION; INTRACAUDAL ONCE
Qty: 0.5 ML | Refills: 0
Start: 2017-12-18 | End: 2017-12-18

## 2017-12-18 NOTE — PATIENT INSTRUCTIONS
Jason Kearns Disease: Exercises  Your Care Instructions  Here are some examples of typical rehabilitation exercises for your condition. Start each exercise slowly. Ease off the exercise if you start to have pain. Your doctor or your physical or occupational therapist will tell you when you can start these exercises and which ones will work best for you. How to do the exercises  Thumb lifts    1. Place your hand on a flat surface, with your palm up. 2. Lift your thumb away from your palm to make a \"C\" shape. 3. Hold for about 6 seconds. 4. Repeat 8 to 12 times. Passive thumb MP flexion    1. Hold your hand in front of you, and turn your hand so your little finger faces down and your thumb faces up. (Your hand should be in the position used for shaking someone's hand.) You may also rest your hand on a flat surface. 2. Use the fingers on your other hand to bend your thumb down at the point where your thumb connects to your palm. 3. Hold for at least 15 to 30 seconds. 4. Repeat 2 to 4 times. Finkelstein stretch    1. Hold your arms out in front of you. (Your hand should be in the position used for shaking someone's hand.)  2. Bend your thumb toward your palm. 3. Use your other hand to gently stretch your thumb and wrist downward until you feel the stretch on the thumb side of your wrist.  4. Hold for at least 15 to 30 seconds. 5. Repeat 2 to 4 times. Resisted ulnar deviation    For this exercise, you will need elastic exercise material, such as surgical tubing or Thera-Band. 1. Sit leaning forward with your legs slightly spread and your elbow on your thigh. 2. Grasp one end of the band with your palm down, and step on the other end with the foot opposite the hand holding the band. 3. Slowly bend your wrist sideways and away from your knee. 4. Repeat 8 to 12 times. Follow-up care is a key part of your treatment and safety.  Be sure to make and go to all appointments, and call your doctor if you are having problems. It's also a good idea to know your test results and keep a list of the medicines you take. Where can you learn more? Go to http://leandra-giovanni.info/. Enter P045 in the search box to learn more about \"De Quervain's Disease: Exercises. \"  Current as of: March 21, 2017  Content Version: 11.4  © 7911-6440 [employer]. Care instructions adapted under license by [employer] (which disclaims liability or warranty for this information). If you have questions about a medical condition or this instruction, always ask your healthcare professional. [place] [address] any warranty or liability for your use of this information. Joint Injections: Care Instructions  Your Care Instructions  Joint injections are shots into a joint, such as the knee. They may be used to put in medicines, such as pain relievers. Or they can be used to take out fluid. Sometimes the fluid is tested in a lab. This can help find the cause of a joint problem. A corticosteroid, or steroid, shot is used to reduce inflammation in tendons or joints. It is often used to treat problems such as arthritis, tendinitis, and bursitis. Steroids can be injected directly into a painful, inflamed joint. They can also help reduce inflammation of a bursa. A bursa is a sac of fluid. It cushions and lubricates areas where tendons, ligaments, skin, muscles, or bones rub against each other. A steroid shot can sometimes help with short-term pain relief when other treatments haven't worked. If steroid shots help, pain may improve for weeks or months. Follow-up care is a key part of your treatment and safety. Be sure to make and go to all appointments, and call your doctor if you are having problems. It's also a good idea to know your test results and keep a list of the medicines you take. How can you care for yourself at home?   · Put ice or a cold pack on the area for 10 to 20 minutes at a time. Put a thin cloth between the ice and your skin. · Take anti-inflammatory medicines to reduce pain, swelling, or inflammation. These include ibuprofen (Advil, Motrin) and naproxen (Aleve). Read and follow all instructions on the label. · Avoid strenuous activities for several days, especially those that put stress on the area where you got the shot. · If you have dressings over the area, keep them clean and dry. You may remove them when your doctor tells you to. When should you call for help? Call your doctor now or seek immediate medical care if:  ? · You have signs of infection, such as:  ¨ Increased pain, swelling, warmth, or redness. ¨ Red streaks leading from the site. ¨ Pus draining from the site. ¨ A fever. ? Watch closely for changes in your health, and be sure to contact your doctor if you have any problems. Where can you learn more? Go to http://leandra-giovanni.info/. Enter N616 in the search box to learn more about \"Joint Injections: Care Instructions. \"  Current as of: March 21, 2017  Content Version: 11.4  © 4672-6477 [employer]. Care instructions adapted under license by [employer] (which disclaims liability or warranty for this information). If you have questions about a medical condition or this instruction, always ask your healthcare professional. [address] any warranty or liability for your use of this information.

## 2017-12-18 NOTE — PROGRESS NOTES
Patient: Jun Breaux                MRN: [id number]       SSN: [id number]  YOB: 1995        AGE: 20 y.o. SEX: male    PCP: José Miguel Schwarz MD  12/18/17    Chief Complaint   Patient presents with    Wrist Pain     LEFT     HISTORY:  Jun Breaux is a 20 y.o. male who is seen for radial left wrist pain. He reports that he was moving some boxes of decorations up steps at home recently. He began experiencing increased left wrist pain afterward. He notes that he has pain when playing card games such as [employer]. He was previously seen for bilateral shoulder L>R and neck pain. He reports pain radiating from his neck through his arm. He states that his shoulders bother him the most. He states he has increased pain after pushing a mower and lifting heavy items. He states he picks up and pushes heavy items such as bins and crates relatively often. He denies any history of injury. He states his neck pain is now tolerable. Pain Assessment  12/18/2017   Location of Pain Wrist   Location Modifiers Left   Severity of Pain 7   Quality of Pain Sharp   Duration of Pain -   Frequency of Pain Constant   Aggravating Factors Bending;Stretching   Limiting Behavior Yes   Relieving Factors Rest   Result of Injury No     Occupation, etc:  Mr. Elba Luke  receives social security disability benefits for his learning disability. He goes by [employer]. 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 He lives in [place] with his mother, sister and Jan Guillory. He states he spends most of his time helping his mother. He does try to go on a walk every day. Her grandson is 10 and on the honor roll. Current weight is 352 pounds. He reports he has gained 5 pounds recently. He is 5'8\" tall. He is not diabetic or hypertensive.      No results found for: HBA1C, HGBE8, HLJ6BFYJ, BWE5OBXB, UZB5XQOV  Weight Metrics 12/18/2017 10/2/2017 9/7/2017 8/31/2017 7/21/2017 4/20/2017 3/9/2017   Weight 352 lb 353 lb 9.6 oz 348 lb 347 lb 345 lb 348 lb 342 lb   BMI 51.98 kg/m2 53.76 kg/m2 52.91 kg/m2 52.76 kg/m2 52.46 kg/m2 52.91 kg/m2 50.5 kg/m2       Patient Active Problem List   Diagnosis Code    Palpitations R00.2    Shortness of breath R06.02    Asthma J45.909    Other specified cardiac dysrhythmias(427.89) I49.8    Cardiomyopathy ([facility] I42.9    Essential hypertension with goal blood pressure less than 140/90 I10    Morbid obesity ([facility] E66.01     REVIEW OF SYSTEMS: All Below are Negative except: See HPI   Constitutional: negative for fever, chills, and weight loss. Cardiovascular: negative for chest pain, claudication, leg swelling, SOB, [place]   Gastrointestinal: Negative for pain, N/V/C/D, Blood in stool or urine, dysuria,  hematuria, incontinence, pelvic pain. Musculoskeletal: See HPI   Neurological: Negative for dizziness and weakness. Negative for headaches, Visual changes, confusion, seizures   Phychiatric/Behavioral: Negative for depression, memory loss, substance  abuse. Extremities: Negative for hair changes, rash, or skin lesion changes. Hematologic: Negative for bleeding problems, bruising, pallor or swollen lymph  nodes   Peripheral Vascular: No calf pain, no circulation deficits. Social History     Social History    Marital status: SINGLE     Spouse name: N/A    Number of children: N/A    Years of education: N/A     Occupational History    Not on file. Social History Main Topics    Smoking status: Never Smoker    Smokeless tobacco: Never Used    Alcohol use No    Drug use: No    Sexual activity: Not on file     Other Topics Concern    Not on file     Social History Narrative      Allergies   Allergen Reactions    Penicillins Unable to Obtain      Current Outpatient Prescriptions   Medication Sig    traMADol (ULTRAM) 50 mg tablet Take 1 Tab by mouth two (2) times daily as needed for Pain. Max Daily Amount: 100 mg.  Indications: Pain    VITAMIN D2 50,000 unit capsule take 1 capsule by mouth every week    cyclobenzaprine (FLEXERIL) 10 mg tablet Take 1 Tab by mouth nightly.  lisinopril (PRINIVIL, ZESTRIL) 2.5 mg tablet Take 1 Tab by mouth daily.  vitamin E (AQUA GEMS) 400 unit capsule Take 1 Cap by mouth daily.  carvedilol (COREG) 6.25 mg tablet Take 1 Tab by mouth two (2) times daily (with meals).  selenium sulfide 2.25 % sham Use twice a week    tiotropium bromide (SPIRIVA RESPIMAT) 1.25 mcg/actuation inhaler Take 2 Puffs by inhalation daily.  gemfibrozil (LOPID) 600 mg tablet Take 600 mg by mouth two (2) times a day.  aspirin delayed-release 81 mg tablet Take  by mouth daily.  naproxen (NAPROSYN) 500 mg tablet Take 500 mg by mouth as needed.  albuterol (VENTOLIN HFA) 90 mcg/actuation inhaler Take  by inhalation as needed. No current facility-administered medications for this visit.        PHYSICAL EXAMINATION:  Visit Vitals    /61    Pulse 89    Ht 5' 9\" (1.753 m)    Wt (!) 352 lb (159.7 kg)    SpO2 99%    BMI 51.98 kg/m2      ORTHO EXAMINATION:  Examination Right Wrist Left Wrist   Skin Intact Intact   Tenderness - +, First extensor compartment   Flexion 40 40   Extension 30 30   Deformity - -   Effusion - -   Finger flexion Full Full   Finger extension Full Full   Tinel's sign - -   Phalen's test - -   Finklestein maneuver - +   Pain with thumb abduction - +, mild   Capillary refill - -     Examination Neck   Skin Intact   Tenderness +   Tightness +   Flexion Decreased 25%   Extension Decreased 25%   Lateral bend left Normal   Lateral bend right Normal   Masses -   Biceps reflex Normal   Triceps reflex Normal   Brachioradialis reflex Normal     Examination Right shoulder Left shoulder   Skin Intact Intact   Effusion - -   Biceps deformity - -   Atrophy - -   AC joint tenderness + +   Acromial tenderness + +   Biceps tenderness - -   Forward flexion/Elevation , with pain 170, with pain   Active abduction  170   External rotation ROM 10, with pain 10   Internal rotation ROM 90, with pain 90   Apprehension - -   Impingement + -   Drop Arm Test - -   Neurovascular Intact Intact     PROCEDURE:  After discussing treatment options, patient's left wrist was injected 1/2 cc Celestone and 1/2 cc Marcaine. Chart reviewed for the following:   Barbee Barthel, MD, have reviewed the History, Physical and updated the Allergic reactions for [address] performed immediately prior to start of procedure:  Barbee Barthel, MD, have performed the following reviews on Katherin Preston prior to the start of the procedure:            * Patient was identified by name and date of birth   * Agreement on procedure being performed was verified  * Risks and Benefits explained to the patient  * Procedure site verified and marked as necessary  * Patient was positioned for comfort  * Consent was obtained     Time: 10:52 AM     Date of procedure: 12/18/2017    Procedure performed by:  James Crawford MD    Mr. Ghulam Molina tolerated the procedure well with no complications. RADIOGRAPHS:  XR BILATERAL SHOULDERS 10/2/17  IMPRESSION:  Three views - No fractures, no acromioclavicular narrowing, no glenohumeral narrowing, no calcific densities. IMPRESSION:      ICD-10-CM ICD-9-CM    1. De Quervain's tenosynovitis, left M65.4 727.04 bupivacaine, PF, (MARCAINE, PF,) 0.25 % (2.5 mg/mL) injection      betamethasone (CELESTONE SOLUSPAN) 6 mg/mL injection      BETAMETHASONE ACETATE & SODIUM PHOSPHATE INJECTION 3 MG EA.      GA INJECT TENDON SHEATH/LIGAMENT   2. Left wrist pain M25.532 719.43 bupivacaine, PF, (MARCAINE, PF,) 0.25 % (2.5 mg/mL) injection      betamethasone (CELESTONE SOLUSPAN) 6 mg/mL injection      BETAMETHASONE ACETATE & SODIUM PHOSPHATE INJECTION 3 MG EA.      GA INJECT TENDON SHEATH/LIGAMENT   3.  BMI 50.0-59.9, adult [facility]) Z68.43 V85.43      PLAN:  After discussing treatment options, patient's left wrist was injected 1/2 cc Celestone and 1/2 cc Giselle. He will follow up as needed. Continue weight loss efforts with a low carb diet. He will be referred for bariatric surgery consultation.     Scribed by Demetrio Gonzalez ([address]) as dictated by Olive Doty MD

## 2017-12-18 NOTE — MR AVS SNAPSHOT
Visit Information Date & Time Provider Department Dept. Phone Encounter #  
 12/18/2017 10:15 AM Yvette Tellez MD [facility]  Follow-up Instructions Return if symptoms worsen or fail to improve. Your Appointments 3/8/2018  9:15 AM  
ESTABLISHED PATIENT with Stoney Hamman, MD  
[facility]) Appt Note: 6 months [address], [address] [place] [address]  
[facility], [address] [place] [address] Upcoming Health Maintenance Date Due Pneumococcal 19-64 Medium Risk (1 of 1 - PPSV23) 4/4/2013 DTaP/Tdap/Td series (2 - Td) 1/1/2015 Influenza Age 5 to Adult 8/1/2017 Allergies as of 12/18/2017  Review Complete On: 12/18/2017 By: Yvette Tellez MD  
  
 Severity Noted Reaction Type Reactions Penicillins High 05/05/2014    Unable to Obtain Current Immunizations  Never Reviewed No immunizations on file. Not reviewed this visit You Were Diagnosed With   
  
 Codes Comments De Quervain's tenosynovitis, left    -  Primary ICD-10-CM: M65.4 ICD-9-CM: 727.04 Left wrist pain     ICD-10-CM: M25.532 ICD-9-CM: 719.43 BMI 50.0-59.9, adult [facility])     ICD-10-CM: V13.70 
ICD-9-CM: V85.43 Vitals BP Pulse Height(growth percentile) Weight(growth percentile) SpO2 BMI  
 121/61 89 5' 9\" (1.753 m) (!) 352 lb (159.7 kg) 99% 51.98 kg/m2 Smoking Status Never Smoker BMI and BSA Data Body Mass Index Body Surface Area 51.98 kg/m 2 2.79 m 2 Preferred Pharmacy Pharmacy Name Phone [address], [address] [place] [address] [phone] Your Updated Medication List  
  
   
This list is accurate as of: 12/18/17 11:01 AM.  Always use your most recent med list.  
  
  
  
  
 aspirin delayed-release 81 mg tablet Take  by mouth daily. betamethasone 6 mg/mL injection Commonly known as:  CELESTONE SOLUSPAN  
1 mL by Intra artICUlar route once for 1 dose. bupivacaine (PF) 0.25 % (2.5 mg/mL) injection Commonly known as:  MARCAINE (PF)  
0.5 mL by Intra artICUlar route once for 1 dose. carvedilol 6.25 mg tablet Commonly known as:  Ana Lee Take 1 Tab by mouth two (2) times daily (with meals). cyclobenzaprine 10 mg tablet Commonly known as:  FLEXERIL Take 1 Tab by mouth nightly. lisinopril 2.5 mg tablet Commonly known as:  Ubaldo Dianne Take 1 Tab by mouth daily. LOPID 600 mg tablet Generic drug:  gemfibrozil Take 600 mg by mouth two (2) times a day. naproxen 500 mg tablet Commonly known as:  NAPROSYN Take 500 mg by mouth as needed. selenium sulfide 2.25 % Sham  
Use twice a week SPIRIVA RESPIMAT 1.25 mcg/actuation inhaler Generic drug:  tiotropium bromide Take 2 Puffs by inhalation daily. traMADol 50 mg tablet Commonly known as:  ULTRAM  
Take 1 Tab by mouth two (2) times daily as needed for Pain. Max Daily Amount: 100 mg. Indications: Pain VENTOLIN HFA 90 mcg/actuation inhaler Generic drug:  albuterol Take  by inhalation as needed. VITAMIN D2 50,000 unit capsule Generic drug:  ergocalciferol  
take 1 capsule by mouth every week  
  
 vitamin E 400 unit capsule Commonly known as:  [address] Take 1 Cap by mouth daily. We Performed the Following BETAMETHASONE ACETATE & SODIUM PHOSPHATE INJECTION 3 MG EA. [ HCP] ND INJECT TENDON SHEATH/LIGAMENT [id number] CPT(R)] Follow-up Instructions Return if symptoms worsen or fail to improve. Patient Instructions Charlenenia Latch Disease: Exercises Your Care Instructions Here are some examples of typical rehabilitation exercises for your condition. Start each exercise slowly. Ease off the exercise if you start to have pain. Your doctor or your physical or occupational therapist will tell you when you can start these exercises and which ones will work best for you. How to do the exercises Thumb lifts 1. Place your hand on a flat surface, with your palm up. 2. Lift your thumb away from your palm to make a \"C\" shape. 3. Hold for about 6 seconds. 4. Repeat 8 to 12 times. Passive thumb MP flexion 1. Hold your hand in front of you, and turn your hand so your little finger faces down and your thumb faces up. (Your hand should be in the position used for shaking someone's hand.) You may also rest your hand on a flat surface. 2. Use the fingers on your other hand to bend your thumb down at the point where your thumb connects to your palm. 3. Hold for at least 15 to 30 seconds. 4. Repeat 2 to 4 times. Finkelstein stretch 1. Hold your arms out in front of you. (Your hand should be in the position used for shaking someone's hand.) 2. Bend your thumb toward your palm. 3. Use your other hand to gently stretch your thumb and wrist downward until you feel the stretch on the thumb side of your wrist. 
4. Hold for at least 15 to 30 seconds. 5. Repeat 2 to 4 times. Resisted ulnar deviation For this exercise, you will need elastic exercise material, such as surgical tubing or Thera-Band. 1. Sit leaning forward with your legs slightly spread and your elbow on your thigh. 2. Grasp one end of the band with your palm down, and step on the other end with the foot opposite the hand holding the band. 3. Slowly bend your wrist sideways and away from your knee. 4. Repeat 8 to 12 times. Follow-up care is a key part of your treatment and safety. Be sure to make and go to all appointments, and call your doctor if you are having problems. It's also a good idea to know your test results and keep a list of the medicines you take. Where can you learn more? Go to http://leandra-giovanni.info/. Enter T488 in the search box to learn more about \"De Quervain's Disease: Exercises. \" Current as of: March 21, 2017 Content Version: 11.4 © 1914-0662 [employer]. Care instructions adapted under license by [employer] (which disclaims liability or warranty for this information). If you have questions about a medical condition or this instruction, always ask your healthcare professional. [place] [address] any warranty or liability for your use of this information. Joint Injections: Care Instructions Your Care Instructions Joint injections are shots into a joint, such as the knee. They may be used to put in medicines, such as pain relievers. Or they can be used to take out fluid. Sometimes the fluid is tested in a lab. This can help find the cause of a joint problem. A corticosteroid, or steroid, shot is used to reduce inflammation in tendons or joints. It is often used to treat problems such as arthritis, tendinitis, and bursitis. Steroids can be injected directly into a painful, inflamed joint. They can also help reduce inflammation of a bursa. A bursa is a sac of fluid. It cushions and lubricates areas where tendons, ligaments, skin, muscles, or bones rub against each other. A steroid shot can sometimes help with short-term pain relief when other treatments haven't worked. If steroid shots help, pain may improve for weeks or months. Follow-up care is a key part of your treatment and safety. Be sure to make and go to all appointments, and call your doctor if you are having problems. It's also a good idea to know your test results and keep a list of the medicines you take. How can you care for yourself at home? · Put ice or a cold pack on the area for 10 to 20 minutes at a time. Put a thin cloth between the ice and your skin. · Take anti-inflammatory medicines to reduce pain, swelling, or inflammation.  These include ibuprofen (Advil, Motrin) and naproxen (Vibha). Read and follow all instructions on the label. · Avoid strenuous activities for several days, especially those that put stress on the area where you got the shot. · If you have dressings over the area, keep them clean and dry. You may remove them when your doctor tells you to. When should you call for help? Call your doctor now or seek immediate medical care if: 
? · You have signs of infection, such as: 
¨ Increased pain, swelling, warmth, or redness. ¨ Red streaks leading from the site. ¨ Pus draining from the site. ¨ A fever. ? Watch closely for changes in your health, and be sure to contact your doctor if you have any problems. Where can you learn more? Go to http://leandra-giovanni.info/. Enter N616 in the search box to learn more about \"Joint Injections: Care Instructions. \" Current as of: March 21, 2017 Content Version: 11.4 © 8719-0199 [employer]. Care instructions adapted under license by [employer] (which disclaims liability or warranty for this information). If you have questions about a medical condition or this instruction, always ask your healthcare professional. [place] [address] any warranty or liability for your use of this information. Introducing [facility]! [facility] introduces [employer] patient portal. Now you can access parts of your medical record, email your doctor's office, and request medication refills online. 1. In your internet browser, go to [email or web address] 2. Click on the First Time User? Click Here link in the Sign In box. You will see the New Member Sign Up page. 3. Enter your [employer] Access Code exactly as it appears below. You will not need to use this code after youve completed the sign-up process. If you do not sign up before the expiration date, you must request a new code. · [employer] Access Code: [email or web address] Expires: 3/18/2018 11:01 AM 
 
 4. Enter the last four digits of your Social Security Number (xxxx) and Date of Birth (mm/dd/yyyy) as indicated and click Submit. You will be taken to the next sign-up page. 5. Create a [employer] ID. This will be your [employer] login ID and cannot be changed, so think of one that is secure and easy to remember. 6. Create a [employer] password. You can change your password at any time. 7. Enter your Password Reset Question and Answer. This can be used at a later time if you forget your password. 8. Enter your e-mail address. You will receive e-mail notification when new information is available in [address] 9. Click Sign Up. You can now view and download portions of your medical record. 10. Click the Download Summary menu link to download a portable copy of your medical information. If you have questions, please visit the Frequently Asked Questions section of the [employer] website. Remember, [employer] is NOT to be used for urgent needs. For medical emergencies, dial 911. Now available from your iPhone and Android! Please provide this summary of care documentation to your next provider. Your primary care clinician is listed as Mindy Poole. If you have any questions after today's visit, please call [phone].

## 2018-02-05 ENCOUNTER — OFFICE VISIT (OUTPATIENT)
Dept: FAMILY MEDICINE CLINIC | Facility: CLINIC | Age: 23
End: 2018-02-05

## 2018-02-05 VITALS
DIASTOLIC BLOOD PRESSURE: 75 MMHG | SYSTOLIC BLOOD PRESSURE: 142 MMHG | HEART RATE: 72 BPM | OXYGEN SATURATION: 99 % | WEIGHT: 315 LBS | HEIGHT: 69 IN | BODY MASS INDEX: 46.65 KG/M2

## 2018-02-05 DIAGNOSIS — E55.9 VITAMIN D DEFICIENCY: ICD-10-CM

## 2018-02-05 DIAGNOSIS — M54.50 BILATERAL LOW BACK PAIN WITHOUT SCIATICA, UNSPECIFIED CHRONICITY: ICD-10-CM

## 2018-02-05 DIAGNOSIS — I10 ESSENTIAL HYPERTENSION WITH GOAL BLOOD PRESSURE LESS THAN 140/90: Primary | ICD-10-CM

## 2018-02-05 DIAGNOSIS — E78.5 HYPERLIPIDEMIA, UNSPECIFIED HYPERLIPIDEMIA TYPE: ICD-10-CM

## 2018-02-05 DIAGNOSIS — E66.01 MORBID OBESITY (HCC): ICD-10-CM

## 2018-02-05 DIAGNOSIS — J45.40 MODERATE PERSISTENT ASTHMA WITHOUT COMPLICATION: ICD-10-CM

## 2018-02-05 DIAGNOSIS — L21.9 SEBORRHEIC DERMATITIS: ICD-10-CM

## 2018-02-05 RX ORDER — TRIAMCINOLONE ACETONIDE 1 MG/G
CREAM TOPICAL
Qty: 15 G | Refills: 11 | Status: SHIPPED | OUTPATIENT
Start: 2018-02-05

## 2018-02-05 RX ORDER — KETOCONAZOLE 20 MG/ML
SHAMPOO TOPICAL
Qty: 120 ML | Refills: 11 | Status: SHIPPED | OUTPATIENT
Start: 2018-02-05 | End: 2019-02-13 | Stop reason: SDUPTHER

## 2018-02-05 RX ORDER — CARVEDILOL 6.25 MG/1
6.25 TABLET ORAL 2 TIMES DAILY WITH MEALS
Qty: 60 TAB | Refills: 11 | Status: SHIPPED | OUTPATIENT
Start: 2018-02-05 | End: 2019-02-07 | Stop reason: SDUPTHER

## 2018-02-05 RX ORDER — CYCLOBENZAPRINE HCL 10 MG
10 TABLET ORAL
Qty: 30 TAB | Refills: 11 | Status: SHIPPED | OUTPATIENT
Start: 2018-02-05 | End: 2019-02-13 | Stop reason: SDUPTHER

## 2018-02-05 RX ORDER — ALBUTEROL SULFATE 90 UG/1
2 AEROSOL, METERED RESPIRATORY (INHALATION)
Qty: 1 INHALER | Refills: 5 | Status: SHIPPED | OUTPATIENT
Start: 2018-02-05 | End: 2019-02-13 | Stop reason: SDUPTHER

## 2018-02-05 RX ORDER — GEMFIBROZIL 600 MG/1
600 TABLET, FILM COATED ORAL 2 TIMES DAILY
Qty: 60 TAB | Refills: 11 | Status: SHIPPED | OUTPATIENT
Start: 2018-02-05 | End: 2019-02-07 | Stop reason: SDUPTHER

## 2018-02-05 RX ORDER — LISINOPRIL 2.5 MG/1
2.5 TABLET ORAL DAILY
Qty: 30 TAB | Refills: 11 | Status: SHIPPED | OUTPATIENT
Start: 2018-02-05 | End: 2019-02-07 | Stop reason: SDUPTHER

## 2018-02-05 NOTE — PROGRESS NOTES
HISTORY OF PRESENT ILLNESS  Rufina Thrasher is a 20 y.o. male. HPI Comments: Seen in follow-up for HTN, Vitamin D deficiency, seborrheic dermatitis of scalp, morbid obesity, back pain. No problems with medications, though he is having some trouble with his pharmacy. His mother is concerned about some recent tremor in his hands when he is frustrated or anxious, and she wonders if he could take medication for this. Medication Refill   Associated symptoms include headaches. Pertinent negatives include no chest pain and no shortness of breath. Past Medical History:   Diagnosis Date    Asthma 5/5/2014    possible asthma r/o cardiac etiology h/o chest trauma as a child     Hypertension     Other specified cardiac dysrhythmias(427.89) 5/5/2014    marked sinus bradycardia     Palpitations 5/5/2014    r/o arrythmia     Shortness of breath 5/5/2014    possible asthma r/o cardiac etiology h/o chest trauma as a child        Past Surgical History:   Procedure Laterality Date    HX ADENOIDECTOMY         History   Smoking Status    Never Smoker   Smokeless Tobacco    Never Used     Current Outpatient Prescriptions   Medication Sig    VITAMIN D2 50,000 unit capsule take 1 capsule by mouth every week    cyclobenzaprine (FLEXERIL) 10 mg tablet Take 1 Tab by mouth nightly.  lisinopril (PRINIVIL, ZESTRIL) 2.5 mg tablet Take 1 Tab by mouth daily.  vitamin E (AQUA GEMS) 400 unit capsule Take 1 Cap by mouth daily.  carvedilol (COREG) 6.25 mg tablet Take 1 Tab by mouth two (2) times daily (with meals).  selenium sulfide 2.25 % sham Use twice a week    tiotropium bromide (SPIRIVA RESPIMAT) 1.25 mcg/actuation inhaler Take 2 Puffs by inhalation daily.  gemfibrozil (LOPID) 600 mg tablet Take 600 mg by mouth two (2) times a day.  aspirin delayed-release 81 mg tablet Take  by mouth daily.  naproxen (NAPROSYN) 500 mg tablet Take 500 mg by mouth as needed.     albuterol (VENTOLIN HFA) 90 mcg/actuation inhaler Take  by inhalation as needed. No current facility-administered medications for this visit. Review of Systems   Constitutional: Negative for chills and fever. Respiratory: Negative for shortness of breath. Cardiovascular: Negative for chest pain, palpitations and leg swelling. Gastrointestinal: Negative for nausea and vomiting. Musculoskeletal: Positive for back pain. Neurological: Positive for tremors and headaches. Negative for tingling. Psychiatric/Behavioral: The patient does not have insomnia. Visit Vitals    /75 (BP 1 Location: Right arm, BP Patient Position: Sitting)    Pulse 72    Ht 5' 9\" (1.753 m)    Wt (!) 351 lb 6.4 oz (159.4 kg)    SpO2 99%    BMI 51.89 kg/m2       Physical Exam   Constitutional: He is oriented to person, place, and time. He appears well-developed and well-nourished. No distress. Neck: Neck supple. No thyromegaly present. Carotid bruit absent   Cardiovascular: Normal rate, regular rhythm and intact distal pulses. Exam reveals no gallop and no friction rub. No murmur heard. Pulmonary/Chest: Effort normal and breath sounds normal. No respiratory distress. Musculoskeletal: He exhibits no edema. Lymphadenopathy:     He has no cervical adenopathy. Neurological: He is alert and oriented to person, place, and time. Skin: Skin is warm and dry. Seborrhea on scalp, eyebrows, around nose - with scaling   Psychiatric: He has a normal mood and affect. His behavior is normal. Judgment and thought content normal.   Nursing note and vitals reviewed. ASSESSMENT and PLAN    ICD-10-CM ICD-9-CM    1. Essential hypertension with goal blood pressure less than 140/90 I10 401.9 lisinopril (PRINIVIL, ZESTRIL) 2.5 mg tablet      carvedilol (COREG) 6.25 mg tablet   2. Morbid obesity ([address] E66.01 278.01    3. Seborrheic dermatitis L21.9 690.10 ketoconazole (NIZORAL) 2 % shampoo      triamcinolone acetonide (KENALOG) 0.1 % topical cream   4. Vitamin D deficiency E55.9 268.9 VITAMIN D, 25 HYDROXY   5. Bilateral low back pain without sciatica, unspecified chronicity M54.5 724.2 cyclobenzaprine (FLEXERIL) 10 mg tablet   6. Moderate persistent asthma without complication I98.96 894.38 tiotropium bromide (SPIRIVA RESPIMAT) 1.25 mcg/actuation inhaler      albuterol (VENTOLIN HFA) 90 mcg/actuation inhaler   7. Hyperlipidemia, unspecified hyperlipidemia type E78.5 272.4 gemfibrozil (LOPID) 600 mg tablet      LIPID PANEL      METABOLIC PANEL, COMPREHENSIVE     Follow-up Disposition:  Return in about 6 months (around 8/5/2018). the following changes in treatment are made: Change from Selsun to Ketoconazole shampoo, TAC cream for seborrhea on his face. Refills as noted. I don't think anxiety medication is indicated - he seems to be managing his symptoms on his own.  lab results and schedule of future lab studies reviewed with patient - including Vitamin D level. reviewed medications and side effects in detail  Plan of care reviewed - patient verbalize(s) understanding and agreement.

## 2018-02-05 NOTE — PROGRESS NOTES
Identified pt with two pt identifiers(name and ). Reviewed record in preparation for visit and have obtained necessary documentation. Chief Complaint   Patient presents with    Medication Refill     would like printed RX due to problems with the pharmacy        Health Maintenance Due   Topic    Pneumococcal 19-64 Medium Risk (1 of 1 - PPSV23)    DTaP/Tdap/Td series (2 - Td)    Influenza Age 5 to Adult     HM reviewed w/patient. Patient is not sure if he had the Influenza done. Coordination of Care Questionnaire:  :   1) Have you been to an emergency room, urgent care clinic since your last visit? no   Hospitalized since your last visit? no             2. Have seen or consulted any other health care provider since your last visit? No  If yes, where when, and reason for visit? 3) Do you have an Advanced Directive/ Living Will in place? No  If no, would you like information No    Patient is accompanied by mother I have received verbal consent from Dk Bautista to discuss any/all medical information while they are present in the room.

## 2018-02-05 NOTE — MR AVS SNAPSHOT
[address] [facility] 
 
 
 14 [facility] [address] [facility] [address] [phone] Patient: Selma Carranza MRN: [id number] EIZ:9/7/[birth year] Visit Information Date & Time Provider Department Dept. Phone Encounter #  
 2/5/2018 11:45 AM Maine Olivas MD [facility] [phone] [id number] Follow-up Instructions Return in about 6 months (around 8/5/2018). Your Appointments 3/8/2018  9:15 AM  
ESTABLISHED PATIENT with Adolfo Butcher MD  
[facility]) Appt Note: 6 months 178 [facility], [address] [facility] [address] 6[address] [facility], [address] [place] [address] Upcoming Health Maintenance Date Due Pneumococcal 19-64 Medium Risk (1 of 1 - PPSV23) 4/4/2013 DTaP/Tdap/Td series (2 - Td) 1/1/2015 Influenza Age 5 to Adult 8/1/2017 Allergies as of 2/5/2018  Review Complete On: 2/5/2018 By: Maine Olivas MD  
  
 Severity Noted Reaction Type Reactions Penicillins High 05/05/2014    Unable to Obtain Current Immunizations  Reviewed on 2/5/2018 No immunizations on file. Reviewed by Romulo Proctor LPN on 1/6/5055 at 88:39 PM  
You Were Diagnosed With   
  
 Codes Comments Essential hypertension with goal blood pressure less than 140/90    -  Primary ICD-10-CM: I10 
ICD-9-CM: 401.9 Morbid obesity ([facility] [address]     ICD-10-CM: E66.01 
ICD-9-CM: 278.01 Seborrheic dermatitis     ICD-10-CM: L21.9 ICD-9-CM: 690.10 Vitamin D deficiency     ICD-10-CM: E55.9 ICD-9-CM: 268.9 Bilateral low back pain without sciatica, unspecified chronicity     ICD-10-CM: M54.5 ICD-9-CM: 724.2 Moderate persistent asthma without complication     KXJ-29-WP: J45.40 ICD-9-CM: 493.90 Hyperlipidemia, unspecified hyperlipidemia type     ICD-10-CM: E78.5 ICD-9-CM: 272.4 Vitals BP Pulse Height(growth percentile) Weight(growth percentile) SpO2 BMI  
 142/75 (BP 1 Location: Right arm, BP Patient Position: Sitting) 72 5' 9\" (1.753 m) (!) 351 lb 6.4 oz (159.4 kg) 99% 51.89 kg/m2 Smoking Status Never Smoker Vitals History BMI and BSA Data Body Mass Index Body Surface Area  
 51.89 kg/m 2 2.79 m 2 Preferred Pharmacy Pharmacy Name Phone [address] [place] [address], [address] [place] [address] [phone] Your Updated Medication List  
  
   
This list is accurate as of: 2/5/18  1:33 PM.  Always use your most recent med list.  
  
  
  
  
 albuterol 90 mcg/actuation inhaler Commonly known as:  VENTOLIN HFA Take 2 Puffs by inhalation every six (6) hours as needed. aspirin delayed-release 81 mg tablet Take  by mouth daily. carvedilol 6.25 mg tablet Commonly known as:  Jennifer Nath Take 1 Tab by mouth two (2) times daily (with meals). Indications: hypertension  
  
 cyclobenzaprine 10 mg tablet Commonly known as:  FLEXERIL Take 1 Tab by mouth nightly. gemfibrozil 600 mg tablet Commonly known as:  LOPID Take 1 Tab by mouth two (2) times a day.  
  
 ketoconazole 2 % shampoo Commonly known as:  NIZORAL Apply as shampoo 3 times per week. lisinopril 2.5 mg tablet Commonly known as:  [place] Take 1 Tab by mouth daily. Indications: hypertension  
  
 naproxen 500 mg tablet Commonly known as:  NAPROSYN Take 500 mg by mouth as needed. tiotropium bromide 1.25 mcg/actuation inhaler Commonly known as:  Summer Luz Take 2 Puffs by inhalation daily. Indications: MAINTENANCE THERAPY FOR ASTHMA  
  
 triamcinolone acetonide 0.1 % topical cream  
Commonly known as:  KENALOG Apply  to affected area two (2) times daily as needed for Skin Irritation. VITAMIN D2 50,000 unit capsule Generic drug:  ergocalciferol  
take 1 capsule by mouth every week vitamin E 400 unit capsule Commonly known as:  [address] Take 1 Cap by mouth daily. Prescriptions Printed Refills  
 cyclobenzaprine (FLEXERIL) 10 mg tablet 11 Sig: Take 1 Tab by mouth nightly. Class: Print Route: Oral  
 ketoconazole (NIZORAL) 2 % shampoo 11 Sig: Apply as shampoo 3 times per week. Class: Print  
 triamcinolone acetonide (KENALOG) 0.1 % topical cream 11 Sig: Apply  to affected area two (2) times daily as needed for Skin Irritation. Class: Print Route: Topical  
 lisinopril (PRINIVIL, ZESTRIL) 2.5 mg tablet 11 Sig: Take 1 Tab by mouth daily. Indications: hypertension Class: Print Route: Oral  
 carvedilol (COREG) 6.25 mg tablet 11 Sig: Take 1 Tab by mouth two (2) times daily (with meals). Indications: hypertension Class: Print Route: Oral  
 tiotropium bromide (SPIRIVA RESPIMAT) 1.25 mcg/actuation inhaler 11 Sig: Take 2 Puffs by inhalation daily. Indications: MAINTENANCE THERAPY FOR ASTHMA Class: Print Route: Inhalation  
 gemfibrozil (LOPID) 600 mg tablet 11 Sig: Take 1 Tab by mouth two (2) times a day. Class: Print Route: Oral  
 albuterol (VENTOLIN HFA) 90 mcg/actuation inhaler 5 Sig: Take 2 Puffs by inhalation every six (6) hours as needed. Class: Print Route: Inhalation Follow-up Instructions Return in about 6 months (around 8/5/2018). To-Do List   
 02/05/2018 Lab:  LIPID PANEL   
  
 02/05/2018 Lab:  METABOLIC PANEL, COMPREHENSIVE   
  
 02/05/2018 Lab:  VITAMIN D, 25 HYDROXY Introducing [facility] & HEALTH SERVICES! [facility] introduces [employer] patient portal. Now you can access parts of your medical record, email your doctor's office, and request medication refills online. 1. In your internet browser, go to [email or web address] 2. Click on the First Time User? Click Here link in the Sign In box. You will see the New Member Sign Up page. 3. Enter your [employer] Access Code exactly as it appears below. You will not need to use this code after youve completed the sign-up process. If you do not sign up before the expiration date, you must request a new code. · [employer] Access Code: [email or web address] Expires: 3/18/2018 11:01 AM 
 
4. Enter the last four digits of your Social Security Number (xxxx) and Date of Birth (mm/dd/yyyy) as indicated and click Submit. You will be taken to the next sign-up page. 5. Create a [employer] ID. This will be your [employer] login ID and cannot be changed, so think of one that is secure and easy to remember. 6. Create a [employer] password. You can change your password at any time. 7. Enter your Password Reset Question and Answer. This can be used at a later time if you forget your password. 8. Enter your e-mail address. You will receive e-mail notification when new information is available in [address] 9. Click Sign Up. You can now view and download portions of your medical record. 10. Click the Download Summary menu link to download a portable copy of your medical information. If you have questions, please visit the Frequently Asked Questions section of the [employer] website. Remember, [employer] is NOT to be used for urgent needs. For medical emergencies, dial 911. Now available from your iPhone and Android! Please provide this summary of care documentation to your next provider. Your primary care clinician is listed as Lucero Cooper. If you have any questions after today's visit, please call [phone].

## 2018-03-08 ENCOUNTER — OFFICE VISIT (OUTPATIENT)
Dept: CARDIOLOGY CLINIC | Age: 23
End: 2018-03-08

## 2018-03-08 VITALS
BODY MASS INDEX: 46.65 KG/M2 | DIASTOLIC BLOOD PRESSURE: 67 MMHG | HEIGHT: 69 IN | HEART RATE: 76 BPM | SYSTOLIC BLOOD PRESSURE: 117 MMHG | WEIGHT: 315 LBS

## 2018-03-08 DIAGNOSIS — E66.01 MORBID OBESITY (HCC): ICD-10-CM

## 2018-03-08 DIAGNOSIS — I42.9 CARDIOMYOPATHY, UNSPECIFIED TYPE (HCC): ICD-10-CM

## 2018-03-08 DIAGNOSIS — I10 ESSENTIAL HYPERTENSION WITH GOAL BLOOD PRESSURE LESS THAN 140/90: Primary | ICD-10-CM

## 2018-03-08 NOTE — PROGRESS NOTES
HISTORY OF PRESENT ILLNESS  Jeison Michelle is a 20 y.o. male. Cardiomyopathy   The history is provided by the patient. This is a chronic problem. The problem has been resolved. Associated symptoms include shortness of breath. Pertinent negatives include no chest pain, no abdominal pain and no headaches. Hypertension   The history is provided by the patient. This is a chronic problem. The problem has not changed since onset. Associated symptoms include shortness of breath. Pertinent negatives include no chest pain, no abdominal pain and no headaches. Palpitations    Associated symptoms include shortness of breath. Pertinent negatives include no fever, no chest pain, no claudication, no orthopnea, no PND, no abdominal pain, no nausea, no vomiting, no headaches, no dizziness, no weakness, no cough, no hemoptysis and no sputum production. His past medical history is significant for hypertension. Shortness of Breath   The history is provided by the patient. This is a recurrent problem. The problem occurs intermittently. The problem has been rapidly improving. Pertinent negatives include no fever, no headaches, no cough, no sputum production, no hemoptysis, no wheezing, no PND, no orthopnea, no chest pain, no vomiting, no abdominal pain, no rash, no leg swelling and no claudication. Review of Systems   Constitutional: Negative for chills and fever. HENT: Negative for nosebleeds. Eyes: Negative for blurred vision and double vision. Respiratory: Positive for shortness of breath. Negative for cough, hemoptysis, sputum production and wheezing. Cardiovascular: Positive for palpitations. Negative for chest pain, orthopnea, claudication, leg swelling and PND. Gastrointestinal: Negative for abdominal pain, heartburn, nausea and vomiting. Musculoskeletal: Negative for myalgias. Skin: Negative for rash. Neurological: Negative for dizziness, weakness and headaches.    Endo/Heme/Allergies: Does not bruise/bleed easily. Family History   Problem Relation Age of Onset    Diabetes Mother     Hypertension Mother     Heart Attack Father     Hypertension Sister     Cancer Maternal Aunt     Stroke Maternal Grandmother     Stroke Maternal Grandfather     Heart Surgery Neg Hx        Past Medical History:   Diagnosis Date    Asthma 5/5/2014    possible asthma r/o cardiac etiology h/o chest trauma as a child     Hypertension     Other specified cardiac dysrhythmias(427.89) 5/5/2014    marked sinus bradycardia     Palpitations 5/5/2014    r/o arrythmia     Shortness of breath 5/5/2014    possible asthma r/o cardiac etiology h/o chest trauma as a child        Past Surgical History:   Procedure Laterality Date    HX ADENOIDECTOMY         Social History   Substance Use Topics    Smoking status: Never Smoker    Smokeless tobacco: Never Used    Alcohol use No       Allergies   Allergen Reactions    Penicillins Unable to Obtain       Outpatient Prescriptions Marked as Taking for the 3/8/18 encounter (Office Visit) with Antonio Douglas MD   Medication Sig Dispense Refill    cyclobenzaprine (FLEXERIL) 10 mg tablet Take 1 Tab by mouth nightly. 30 Tab 11    ketoconazole (NIZORAL) 2 % shampoo Apply as shampoo 3 times per week. 120 mL 11    triamcinolone acetonide (KENALOG) 0.1 % topical cream Apply  to affected area two (2) times daily as needed for Skin Irritation. 15 g 11    lisinopril (PRINIVIL, ZESTRIL) 2.5 mg tablet Take 1 Tab by mouth daily. Indications: hypertension 30 Tab 11    carvedilol (COREG) 6.25 mg tablet Take 1 Tab by mouth two (2) times daily (with meals). Indications: hypertension 60 Tab 11    tiotropium bromide (SPIRIVA RESPIMAT) 1.25 mcg/actuation inhaler Take 2 Puffs by inhalation daily. Indications: MAINTENANCE THERAPY FOR ASTHMA 1 Inhaler 11    gemfibrozil (LOPID) 600 mg tablet Take 1 Tab by mouth two (2) times a day.  60 Tab 11    albuterol (VENTOLIN HFA) 90 mcg/actuation inhaler Take 2 Puffs by inhalation every six (6) hours as needed. 1 Inhaler 5       Visit Vitals    /67    Pulse 76    Ht 5' 9\" (1.753 m)    Wt 157.4 kg (347 lb)    BMI 51.24 kg/m2         Physical Exam   Constitutional: He is oriented to person, place, and time. He appears well-developed and well-nourished. HENT:   Head: Normocephalic and atraumatic. Eyes: Conjunctivae are normal.   Neck: Neck supple. No JVD present. No tracheal deviation present. No thyromegaly present. Cardiovascular: Normal rate, regular rhythm and normal heart sounds. Exam reveals no gallop and no friction rub. No murmur heard. Pulmonary/Chest: Breath sounds normal. No respiratory distress. He has no wheezes. He has no rales. He exhibits no tenderness. Abdominal: Soft. There is no tenderness. Musculoskeletal: He exhibits no edema. Neurological: He is alert and oriented to person, place, and time. Skin: Skin is warm and dry. Psychiatric: He has a normal mood and affect. Mr. Young Husbands has a reminder for a \"due or due soon\" health maintenance. I have asked that he contact his primary care provider for follow-up on this health maintenance. No flowsheet data found. I have personally reviewed patient's records available from hospital and other providers and incorporated findings in patient care. SUMMARY:echo:12/2015  Left ventricle: Systolic function was normal. Ejection fraction was  estimated to be 55 %. There were no regional wall motion abnormalities. Left ventricular diastolic function parameters were normal.    COMPARISONS:  Comparison was made with the previous study of 02-Jun-2015. LV overall  function has increased from 45 % to 55 %    Assessment         ICD-10-CM ICD-9-CM    1. Essential hypertension with goal blood pressure less than 140/90 I10 401.9     controlled     2. Cardiomyopathy, unspecified type ([address] I42.9 425.4     mild in past-better on last echo  stable  asymptomatic   3.  Morbid obesity ([address] E66.01 278.01     discussed  diet  stable         No orders of the defined types were placed in this encounter. Follow-up Disposition:  Return in about 6 months (around 9/8/2018).

## 2018-03-08 NOTE — LETTER
Veronica Govea 1995 
 
3/8/2018 Dear Andriy Borges MD 
 
I had the pleasure of evaluating  Mr. Tanika Cid in office today. Below are the relevant portions of my assessment and plan of care. ICD-10-CM ICD-9-CM 1. Essential hypertension with goal blood pressure less than 140/90 I10 401.9   
 controlled 2. Cardiomyopathy, unspecified type ([address] I42.9 425.4   
 mild in past-better on last echo 
stable 
asymptomatic 3. Morbid obesity ([address] E66.01 278.01   
 discussed  diet 
stable Current Outpatient Prescriptions Medication Sig Dispense Refill  cyclobenzaprine (FLEXERIL) 10 mg tablet Take 1 Tab by mouth nightly. 30 Tab 11  
 ketoconazole (NIZORAL) 2 % shampoo Apply as shampoo 3 times per week. 120 mL 11  
 triamcinolone acetonide (KENALOG) 0.1 % topical cream Apply  to affected area two (2) times daily as needed for Skin Irritation. 15 g 11  
 lisinopril (PRINIVIL, ZESTRIL) 2.5 mg tablet Take 1 Tab by mouth daily. Indications: hypertension 30 Tab 11  
 carvedilol (COREG) 6.25 mg tablet Take 1 Tab by mouth two (2) times daily (with meals). Indications: hypertension 60 Tab 11  
 tiotropium bromide (SPIRIVA RESPIMAT) 1.25 mcg/actuation inhaler Take 2 Puffs by inhalation daily. Indications: MAINTENANCE THERAPY FOR ASTHMA 1 Inhaler 11  
 gemfibrozil (LOPID) 600 mg tablet Take 1 Tab by mouth two (2) times a day. 60 Tab 11  
 albuterol (VENTOLIN HFA) 90 mcg/actuation inhaler Take 2 Puffs by inhalation every six (6) hours as needed. 1 Inhaler 5 No orders of the defined types were placed in this encounter. If you have questions, please do not hesitate to call me. I look forward to following Mr. Tanika Cid along with you. Sincerely, Kevin Knight MD 
 
 
 
 
Sincerely, Kevin Knight MD

## 2018-03-08 NOTE — MR AVS SNAPSHOT
[address] [facility] 
 
 
 [address] [facility], [address] [facility] [address] 
[phone] Patient: Amparo Mejia MRN: [id number] EOE:[birth year] Visit Information Date & Time Provider Department Dept. Phone Encounter #  
 3/8/2018 12:00 PM Carvin Ganser, MD [facility] [address] [place] [address] [id number] Follow-up Instructions Return in about 6 months (around 9/8/2018). Your Appointments 3/8/2018 12:00 PM  
Office Visit with Carvin Ganser, MD  
[facility]) Appt Note: 6 months; 6 months [address] [facility], [address] [facility] [address]  
1338 [facility], [address] [place] [address] Road [address]  
  
    
 8/6/2018 10:00 AM  
ROUTINE CARE with Giorgi Muniz MD  
[facility] ([address]) Appt Note: 6  mos follow up 14 [facility] 1 [address] [place] [address]  
[phone]  
  
   
 14 [address]  
  
    
 9/12/2018  8:45 AM  
Office Visit with Carvin Ganser, MD  
[facility]) Appt Note: 6 months [address] [facility], [address] [facility] [address]  
[phone] Upcoming Health Maintenance Date Due Pneumococcal 19-64 Medium Risk (1 of 1 - PPSV23) 4/4/2013 DTaP/Tdap/Td series (2 - Td) 1/1/2015 Influenza Age 5 to Adult 8/1/2017 Allergies as of 3/8/2018  Review Complete On: 3/8/2018 By: Carvin Ganser, MD  
  
 Severity Noted Reaction Type Reactions Penicillins High 05/05/2014    Unable to Obtain Current Immunizations  Reviewed on 2/5/2018 No immunizations on file. Not reviewed this visit You Were Diagnosed With   
  
 Codes Comments Essential hypertension with goal blood pressure less than 140/90    -  Primary ICD-10-CM: I10 
ICD-9-CM: 401.9 controlled Cardiomyopathy, unspecified type ([facility]     ICD-10-CM: I42.9 ICD-9-CM: 425.4 mild in past-better on last echo 
stable 
asymptomatic Morbid obesity ([facility] [address]     ICD-10-CM: E66.01 
ICD-9-CM: 278.01 discussed  diet 
stable Vitals BP Pulse Height(growth percentile) Weight(growth percentile) BMI Smoking Status 117/67 76 5' 9\" (1.753 m) 347 lb (157.4 kg) 51.24 kg/m2 Never Smoker Vitals History BMI and BSA Data Body Mass Index Body Surface Area  
 51.24 kg/m 2 2.77 m 2 Preferred Pharmacy Pharmacy Name Phone [address] [place] [address], [address] [place] [address] [phone] Your Updated Medication List  
  
   
This list is accurate as of 3/8/18  9:46 AM.  Always use your most recent med list.  
  
  
  
  
 albuterol 90 mcg/actuation inhaler Commonly known as:  VENTOLIN HFA Take 2 Puffs by inhalation every six (6) hours as needed. carvedilol 6.25 mg tablet Commonly known as:  Carley Sheri Take 1 Tab by mouth two (2) times daily (with meals). Indications: hypertension  
  
 cyclobenzaprine 10 mg tablet Commonly known as:  FLEXERIL Take 1 Tab by mouth nightly. gemfibrozil 600 mg tablet Commonly known as:  LOPID Take 1 Tab by mouth two (2) times a day.  
  
 ketoconazole 2 % shampoo Commonly known as:  NIZORAL Apply as shampoo 3 times per week. lisinopril 2.5 mg tablet Commonly known as:  Brodie Delaney Take 1 Tab by mouth daily. Indications: hypertension  
  
 tiotropium bromide 1.25 mcg/actuation inhaler Commonly known as:  Liliya Casanova Take 2 Puffs by inhalation daily. Indications: MAINTENANCE THERAPY FOR ASTHMA  
  
 triamcinolone acetonide 0.1 % topical cream  
Commonly known as:  KENALOG Apply  to affected area two (2) times daily as needed for Skin Irritation. Follow-up Instructions Return in about 6 months (around 9/8/2018). Introducing [facility] & HEALTH SERVICES!    
 [facility] introduces [employer] patient portal. Now you can access parts of your medical record, email your doctor's office, and request medication refills online. 1. In your internet browser, go to [email or web address] 2. Click on the First Time User? Click Here link in the Sign In box. You will see the New Member Sign Up page. 3. Enter your [employer] Access Code exactly as it appears below. You will not need to use this code after youve completed the sign-up process. If you do not sign up before the expiration date, you must request a new code. · [employer] Access Code: [email or web address] Expires: 3/18/2018 11:01 AM 
 
4. Enter the last four digits of your Social Security Number (xxxx) and Date of Birth (mm/dd/yyyy) as indicated and click Submit. You will be taken to the next sign-up page. 5. Create a [employer] ID. This will be your [employer] login ID and cannot be changed, so think of one that is secure and easy to remember. 6. Create a [employer] password. You can change your password at any time. 7. Enter your Password Reset Question and Answer. This can be used at a later time if you forget your password. 8. Enter your e-mail address. You will receive e-mail notification when new information is available in [address] 9. Click Sign Up. You can now view and download portions of your medical record. 10. Click the Download Summary menu link to download a portable copy of your medical information. If you have questions, please visit the Frequently Asked Questions section of the [employer] website. Remember, [employer] is NOT to be used for urgent needs. For medical emergencies, dial 911. Now available from your iPhone and Android! Please provide this summary of care documentation to your next provider. Your primary care clinician is listed as Erenest Skiff. If you have any questions after today's visit, please call [phone].

## 2018-03-08 NOTE — PROGRESS NOTES
1. Have you been to the ER, urgent care clinic since your last visit? Hospitalized since your last visit? No    2. Have you seen or consulted any other health care providers outside of the [address] [place] [address] since your last visit? Include any pap smears or colon screening.  No     Patient did not bring meds or list but states everything is the same    Patient reports chest pain when feeling anxious, sob with exertion

## 2018-05-16 ENCOUNTER — OFFICE VISIT (OUTPATIENT)
Dept: FAMILY MEDICINE CLINIC | Facility: CLINIC | Age: 23
End: 2018-05-16

## 2018-05-16 VITALS
HEART RATE: 86 BPM | TEMPERATURE: 97.5 F | RESPIRATION RATE: 16 BRPM | DIASTOLIC BLOOD PRESSURE: 63 MMHG | SYSTOLIC BLOOD PRESSURE: 132 MMHG | BODY MASS INDEX: 46.65 KG/M2 | WEIGHT: 315 LBS | OXYGEN SATURATION: 99 % | HEIGHT: 69 IN

## 2018-05-16 DIAGNOSIS — J45.40 MODERATE PERSISTENT ASTHMA WITHOUT COMPLICATION: ICD-10-CM

## 2018-05-16 DIAGNOSIS — M54.50 CHRONIC BILATERAL LOW BACK PAIN WITHOUT SCIATICA: ICD-10-CM

## 2018-05-16 DIAGNOSIS — I10 ESSENTIAL HYPERTENSION WITH GOAL BLOOD PRESSURE LESS THAN 140/90: Primary | ICD-10-CM

## 2018-05-16 DIAGNOSIS — G89.29 CHRONIC BILATERAL LOW BACK PAIN WITHOUT SCIATICA: ICD-10-CM

## 2018-05-16 DIAGNOSIS — E55.9 VITAMIN D DEFICIENCY: ICD-10-CM

## 2018-05-16 DIAGNOSIS — I42.9 CARDIOMYOPATHY, UNSPECIFIED TYPE (HCC): ICD-10-CM

## 2018-05-16 DIAGNOSIS — E66.01 MORBID OBESITY (HCC): ICD-10-CM

## 2018-05-16 RX ORDER — NAPROXEN 500 MG/1
500 TABLET ORAL 2 TIMES DAILY WITH MEALS
Qty: 60 TAB | Refills: 11 | Status: SHIPPED | OUTPATIENT
Start: 2018-05-16 | End: 2020-03-27 | Stop reason: SDUPTHER

## 2018-05-16 NOTE — PROGRESS NOTES
1. Have you been to the ER, urgent care clinic since your last visit? Hospitalized since your last visit? No    2. Have you seen or consulted any other health care providers outside of the [facility] since your last visit? Include any pap smears or colon screening.  No

## 2018-05-16 NOTE — MR AVS SNAPSHOT
[facility] [place] 
 
 
 [address] [facility] [address] [facility] [address] 
[phone] Patient: Tamica Paris MRN: [id number] JSI:[id number] Visit Information Date & Time Provider Department Dept. Phone Encounter #  
 5/16/2018  1:30 PM MD Hari Rivera [phone] [id number] Follow-up Instructions Return in about 3 months (around 8/16/2018). Your Appointments 8/6/2018 10:00 AM  
ROUTINE CARE with MD Hari Rivera ([facility]) Appt Note: 6  mos follow up 14 [facility] [address] [place] [address]  
[phone]  
  
   
 14 [address]  
  
    
 9/12/2018  8:45 AM  
Office Visit with Roxi Reid MD  
[facility]) Appt Note: 6 months [address] [facility], [address] [facility] [address]  
[address], [address] [facility] [address] [facility] Upcoming Health Maintenance Date Due Pneumococcal 19-64 Medium Risk (1 of 1 - PPSV23) 4/4/2013 DTaP/Tdap/Td series (2 - Td) 1/1/2015 Influenza Age 5 to Adult 8/1/2018 Allergies as of 5/16/2018  Review Complete On: 5/16/2018 By: Jeanine Whipple MD  
  
 Severity Noted Reaction Type Reactions Penicillins High 05/05/2014    Unable to Obtain Current Immunizations  Reviewed on 2/5/2018 No immunizations on file. Not reviewed this visit You Were Diagnosed With   
  
 Codes Comments Essential hypertension with goal blood pressure less than 140/90    -  Primary ICD-10-CM: I10 
ICD-9-CM: 401.9 Moderate persistent asthma without complication     NML-19-ZF: J45.40 ICD-9-CM: 493.90 Morbid obesity ([facility] [address] [facility]     ICD-10-CM: E66.01 
ICD-9-CM: 278.01 Cardiomyopathy, unspecified type ([facility]     ICD-10-CM: I42.9 ICD-9-CM: 425.4 Vitamin D deficiency     ICD-10-CM: E55.9 ICD-9-CM: 268.9 Chronic bilateral low back pain without sciatica     ICD-10-CM: M54.5, G89.29 ICD-9-CM: 724.2, 338.29 Vitals BP Pulse Temp Resp Height(growth percentile) Weight(growth percentile) 132/63 86 97.5 °F (36.4 °C) 16 5' 9\" (1.753 m) (!) 356 lb (161.5 kg) SpO2 BMI Smoking Status 99% 52.57 kg/m2 Never Smoker Vitals History BMI and BSA Data Body Mass Index Body Surface Area 52.57 kg/m 2 2.8 m 2 Preferred Pharmacy Pharmacy Name Phone [address], [address] [place], [place] [address]way [phone] Your Updated Medication List  
  
   
This list is accurate as of 5/16/18  2:54 PM.  Always use your most recent med list.  
  
  
  
  
 albuterol 90 mcg/actuation inhaler Commonly known as:  VENTOLIN HFA Take 2 Puffs by inhalation every six (6) hours as needed. carvedilol 6.25 mg tablet Commonly known as:  Janet Solid Take 1 Tab by mouth two (2) times daily (with meals). Indications: hypertension  
  
 cyclobenzaprine 10 mg tablet Commonly known as:  FLEXERIL Take 1 Tab by mouth nightly. gemfibrozil 600 mg tablet Commonly known as:  LOPID Take 1 Tab by mouth two (2) times a day.  
  
 ketoconazole 2 % shampoo Commonly known as:  NIZORAL Apply as shampoo 3 times per week. lisinopril 2.5 mg tablet Commonly known as:  Pecola Cypher Take 1 Tab by mouth daily. Indications: hypertension  
  
 naproxen 500 mg tablet Commonly known as:  NAPROSYN Take 1 Tab by mouth two (2) times daily (with meals). Indications: Pain  
  
 tiotropium bromide 1.25 mcg/actuation inhaler Commonly known as:  Mojgan Flaming Take 2 Puffs by inhalation daily. Indications: MAINTENANCE THERAPY FOR ASTHMA  
  
 triamcinolone acetonide 0.1 % topical cream  
Commonly known as:  KENALOG Apply  to affected area two (2) times daily as needed for Skin Irritation. Prescriptions Printed Refills naproxen (NAPROSYN) 500 mg tablet 11 Sig: Take 1 Tab by mouth two (2) times daily (with meals). Indications: Pain Class: Print Route: Oral  
  
Follow-up Instructions Return in about 3 months (around 8/16/2018). To-Do List   
 Around 05/16/2018 Lab:  LIPID PANEL Around 05/16/2018 Lab:  METABOLIC PANEL, COMPREHENSIVE Around 05/16/2018 Lab:  VITAMIN D, 25 HYDROXY Patient Instructions Body Mass Index: Care Instructions Your Care Instructions Body mass index (BMI) can help you see if your weight is raising your risk for health problems. It uses a formula to compare how much you weigh with how tall you are. · A BMI lower than 18.5 is considered underweight. · A BMI between 18.5 and 24.9 is considered healthy. · A BMI between 25 and 29.9 is considered overweight. A BMI of 30 or higher is considered obese. If your BMI is in the normal range, it means that you have a lower risk for weight-related health problems. If your BMI is in the overweight or obese range, you may be at increased risk for weight-related health problems, such as high blood pressure, heart disease, stroke, arthritis or joint pain, and diabetes. If your BMI is in the underweight range, you may be at increased risk for health problems such as fatigue, lower protection (immunity) against illness, muscle loss, bone loss, hair loss, and hormone problems. BMI is just one measure of your risk for weight-related health problems. You may be at higher risk for health problems if you are not active, you eat an unhealthy diet, or you drink too much alcohol or use tobacco products. Follow-up care is a key part of your treatment and safety. Be sure to make and go to all appointments, and call your doctor if you are having problems. It's also a good idea to know your test results and keep a list of the medicines you take. How can you care for yourself at home? · Practice healthy eating habits. This includes eating plenty of fruits, vegetables, whole grains, lean protein, and low-fat dairy. · If your doctor recommends it, get more exercise. Walking is a good choice. Bit by bit, increase the amount you walk every day. Try for at least 30 minutes on most days of the week. · Do not smoke. Smoking can increase your risk for health problems. If you need help quitting, talk to your doctor about stop-smoking programs and medicines. These can increase your chances of quitting for good. · Limit alcohol to 2 drinks a day for men and 1 drink a day for women. Too much alcohol can cause health problems. If you have a BMI higher than 25 · Your doctor may do other tests to check your risk for weight-related health problems. This may include measuring the distance around your waist. A waist measurement of more than 40 inches in men or 35 inches in women can increase the risk of weight-related health problems. · Talk with your doctor about steps you can take to stay healthy or improve your health. You may need to make lifestyle changes to lose weight and stay healthy, such as changing your diet and getting regular exercise. If you have a BMI lower than 18.5 · Your doctor may do other tests to check your risk for health problems. · Talk with your doctor about steps you can take to stay healthy or improve your health. You may need to make lifestyle changes to gain or maintain weight and stay healthy, such as getting more healthy foods in your diet and doing exercises to build muscle. Where can you learn more? Go to http://leandra-giovanni.info/. Enter S176 in the search box to learn more about \"Body Mass Index: Care Instructions. \" Current as of: October 13, 2016 Content Version: 11.4 © 5847-1599 [employer].  Care instructions adapted under license by [employer] (which disclaims liability or warranty for this information). If you have questions about a medical condition or this instruction, always ask your healthcare professional. [address] any warranty or liability for your use of this information. Introducing [facility] HEALTH SERVICES! [facility] introduces [employer] patient portal. Now you can access parts of your medical record, email your doctor's office, and request medication refills online. 1. In your internet browser, go to [email or web address] 2. Click on the First Time User? Click Here link in the Sign In box. You will see the New Member Sign Up page. 3. Enter your [employer] Access Code exactly as it appears below. You will not need to use this code after youve completed the sign-up process. If you do not sign up before the expiration date, you must request a new code. · [employer] Access Code: [email or web address] Expires: 8/14/2018  1:30 PM 
 
4. Enter the last four digits of your Social Security Number (xxxx) and Date of Birth (mm/dd/yyyy) as indicated and click Submit. You will be taken to the next sign-up page. 5. Create a [employer] ID. This will be your [employer] login ID and cannot be changed, so think of one that is secure and easy to remember. 6. Create a [employer] password. You can change your password at any time. 7. Enter your Password Reset Question and Answer. This can be used at a later time if you forget your password. 8. Enter your e-mail address. You will receive e-mail notification when new information is available in [address] 9. Click Sign Up. You can now view and download portions of your medical record. 10. Click the Download Summary menu link to download a portable copy of your medical information. If you have questions, please visit the Frequently Asked Questions section of the [employer] website. Remember, [employer] is NOT to be used for urgent needs. For medical emergencies, dial 911. Now available from your iPhone and Android! Please provide this summary of care documentation to your next provider. Your primary care clinician is listed as Belinda Santos. If you have any questions after today's visit, please call [phone].

## 2018-05-16 NOTE — PATIENT INSTRUCTIONS
Body Mass Index: Care Instructions  Your Care Instructions    Body mass index (BMI) can help you see if your weight is raising your risk for health problems. It uses a formula to compare how much you weigh with how tall you are. · A BMI lower than 18.5 is considered underweight. · A BMI between 18.5 and 24.9 is considered healthy. · A BMI between 25 and 29.9 is considered overweight. A BMI of 30 or higher is considered obese. If your BMI is in the normal range, it means that you have a lower risk for weight-related health problems. If your BMI is in the overweight or obese range, you may be at increased risk for weight-related health problems, such as high blood pressure, heart disease, stroke, arthritis or joint pain, and diabetes. If your BMI is in the underweight range, you may be at increased risk for health problems such as fatigue, lower protection (immunity) against illness, muscle loss, bone loss, hair loss, and hormone problems. BMI is just one measure of your risk for weight-related health problems. You may be at higher risk for health problems if you are not active, you eat an unhealthy diet, or you drink too much alcohol or use tobacco products. Follow-up care is a key part of your treatment and safety. Be sure to make and go to all appointments, and call your doctor if you are having problems. It's also a good idea to know your test results and keep a list of the medicines you take. How can you care for yourself at home? · Practice healthy eating habits. This includes eating plenty of fruits, vegetables, whole grains, lean protein, and low-fat dairy. · If your doctor recommends it, get more exercise. Walking is a good choice. Bit by bit, increase the amount you walk every day. Try for at least 30 minutes on most days of the week. · Do not smoke. Smoking can increase your risk for health problems. If you need help quitting, talk to your doctor about stop-smoking programs and medicines. These can increase your chances of quitting for good. · Limit alcohol to 2 drinks a day for men and 1 drink a day for women. Too much alcohol can cause health problems. If you have a BMI higher than 25  · Your doctor may do other tests to check your risk for weight-related health problems. This may include measuring the distance around your waist. A waist measurement of more than 40 inches in men or 35 inches in women can increase the risk of weight-related health problems. · Talk with your doctor about steps you can take to stay healthy or improve your health. You may need to make lifestyle changes to lose weight and stay healthy, such as changing your diet and getting regular exercise. If you have a BMI lower than 18.5  · Your doctor may do other tests to check your risk for health problems. · Talk with your doctor about steps you can take to stay healthy or improve your health. You may need to make lifestyle changes to gain or maintain weight and stay healthy, such as getting more healthy foods in your diet and doing exercises to build muscle. Where can you learn more? Go to http://leandra-giovanni.info/. Enter S176 in the search box to learn more about \"Body Mass Index: Care Instructions. \"  Current as of: October 13, 2016  Content Version: 11.4  © 4743-7726 [employer]. Care instructions adapted under license by [employer] (which disclaims liability or warranty for this information). If you have questions about a medical condition or this instruction, always ask your healthcare professional. [address] any warranty or liability for your use of this information.
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
## 2018-05-16 NOTE — LETTER
5/16/2018 2:51 PM 
 
 
Mr. Odilon Bojorquez [address] [place] [address] Mr. Torey Whipple is under my care for obesity, hypertension, and cardiomyopathy. He would derive significant medical benefits from a gym membership (near his home) to promote weight loss. Sincerely, Rosario Li MD

## 2018-08-29 ENCOUNTER — OFFICE VISIT (OUTPATIENT)
Dept: ORTHOPEDIC SURGERY | Facility: CLINIC | Age: 23
End: 2018-08-29

## 2018-08-29 VITALS
BODY MASS INDEX: 46.65 KG/M2 | RESPIRATION RATE: 18 BRPM | DIASTOLIC BLOOD PRESSURE: 71 MMHG | OXYGEN SATURATION: 99 % | HEIGHT: 69 IN | HEART RATE: 73 BPM | TEMPERATURE: 97.5 F | SYSTOLIC BLOOD PRESSURE: 120 MMHG | WEIGHT: 315 LBS

## 2018-08-29 DIAGNOSIS — G89.29 CHRONIC PAIN OF LEFT KNEE: Primary | ICD-10-CM

## 2018-08-29 DIAGNOSIS — M17.0 PRIMARY OSTEOARTHRITIS OF BOTH KNEES: ICD-10-CM

## 2018-08-29 DIAGNOSIS — M25.562 CHRONIC PAIN OF LEFT KNEE: Primary | ICD-10-CM

## 2018-08-29 RX ORDER — TRIAMCINOLONE ACETONIDE 40 MG/ML
40 INJECTION, SUSPENSION INTRA-ARTICULAR; INTRAMUSCULAR ONCE
Qty: 1 ML | Refills: 0
Start: 2018-08-29 | End: 2018-08-29
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
## 2018-10-03 ENCOUNTER — OFFICE VISIT (OUTPATIENT)
Dept: CARDIOLOGY CLINIC | Age: 23
End: 2018-10-03

## 2018-10-03 VITALS
DIASTOLIC BLOOD PRESSURE: 62 MMHG | HEIGHT: 69 IN | SYSTOLIC BLOOD PRESSURE: 122 MMHG | HEART RATE: 79 BPM | BODY MASS INDEX: 46.65 KG/M2 | WEIGHT: 315 LBS

## 2018-10-03 DIAGNOSIS — E66.01 MORBID OBESITY (HCC): ICD-10-CM

## 2018-10-03 DIAGNOSIS — I42.9 CARDIOMYOPATHY, UNSPECIFIED TYPE (HCC): ICD-10-CM

## 2018-10-03 DIAGNOSIS — I10 ESSENTIAL HYPERTENSION WITH GOAL BLOOD PRESSURE LESS THAN 140/90: Primary | ICD-10-CM

## 2018-10-03 NOTE — PROGRESS NOTES
HISTORY OF PRESENT ILLNESS  Johnna Puri is a 24 y.o. male. Cardiomyopathy   The history is provided by the patient. This is a chronic problem. The problem has been resolved. Associated symptoms include shortness of breath. Pertinent negatives include no chest pain, no abdominal pain and no headaches. Hypertension   The history is provided by the patient. This is a chronic problem. The problem has not changed since onset. Associated symptoms include shortness of breath. Pertinent negatives include no chest pain, no abdominal pain and no headaches. Palpitations    Associated symptoms include shortness of breath. Pertinent negatives include no fever, no chest pain, no claudication, no orthopnea, no PND, no abdominal pain, no nausea, no vomiting, no headaches, no dizziness, no weakness, no cough, no hemoptysis and no sputum production. His past medical history is significant for hypertension. Shortness of Breath   The history is provided by the patient. This is a recurrent problem. The problem occurs intermittently. The problem has been rapidly improving. Pertinent negatives include no fever, no headaches, no cough, no sputum production, no hemoptysis, no wheezing, no PND, no orthopnea, no chest pain, no vomiting, no abdominal pain, no rash, no leg swelling and no claudication. Review of Systems   Constitutional: Negative for chills and fever. HENT: Negative for nosebleeds. Eyes: Negative for blurred vision and double vision. Respiratory: Positive for shortness of breath. Negative for cough, hemoptysis, sputum production and wheezing. Cardiovascular: Positive for palpitations. Negative for chest pain, orthopnea, claudication, leg swelling and PND. Gastrointestinal: Negative for abdominal pain, heartburn, nausea and vomiting. Musculoskeletal: Negative for myalgias. Skin: Negative for rash. Neurological: Negative for dizziness, weakness and headaches.    Endo/Heme/Allergies: Does not bruise/bleed easily. Family History   Problem Relation Age of Onset    Diabetes Mother     Hypertension Mother     Heart Attack Father     Hypertension Sister     Cancer Maternal Aunt     Stroke Maternal Grandmother     Stroke Maternal Grandfather     Heart Surgery Neg Hx        Past Medical History:   Diagnosis Date    Asthma 5/5/2014    possible asthma r/o cardiac etiology h/o chest trauma as a child     Hypertension     Other specified cardiac dysrhythmias(427.89) 5/5/2014    marked sinus bradycardia     Palpitations 5/5/2014    r/o arrythmia     Shortness of breath 5/5/2014    possible asthma r/o cardiac etiology h/o chest trauma as a child        Past Surgical History:   Procedure Laterality Date    HX ADENOIDECTOMY         Social History   Substance Use Topics    Smoking status: Never Smoker    Smokeless tobacco: Never Used    Alcohol use No       Allergies   Allergen Reactions    Penicillins Unable to Obtain       Outpatient Prescriptions Marked as Taking for the 10/3/18 encounter (Office Visit) with Bebe Yates MD   Medication Sig Dispense Refill    naproxen (NAPROSYN) 500 mg tablet Take 1 Tab by mouth two (2) times daily (with meals). Indications: Pain 60 Tab 11    cyclobenzaprine (FLEXERIL) 10 mg tablet Take 1 Tab by mouth nightly. 30 Tab 11    ketoconazole (NIZORAL) 2 % shampoo Apply as shampoo 3 times per week. 120 mL 11    triamcinolone acetonide (KENALOG) 0.1 % topical cream Apply  to affected area two (2) times daily as needed for Skin Irritation. 15 g 11    lisinopril (PRINIVIL, ZESTRIL) 2.5 mg tablet Take 1 Tab by mouth daily. Indications: hypertension 30 Tab 11    carvedilol (COREG) 6.25 mg tablet Take 1 Tab by mouth two (2) times daily (with meals). Indications: hypertension 60 Tab 11    tiotropium bromide (SPIRIVA RESPIMAT) 1.25 mcg/actuation inhaler Take 2 Puffs by inhalation daily.  Indications: MAINTENANCE THERAPY FOR ASTHMA 1 Inhaler 11    gemfibrozil (LOPID) 600 mg tablet Take 1 Tab by mouth two (2) times a day. 60 Tab 11    albuterol (VENTOLIN HFA) 90 mcg/actuation inhaler Take 2 Puffs by inhalation every six (6) hours as needed. 1 Inhaler 5       Visit Vitals    /62    Pulse 79    Ht 5' 9\" (1.753 m)    Wt (!) 163.3 kg (360 lb)    BMI 53.16 kg/m2         Physical Exam   Constitutional: He is oriented to person, place, and time. He appears well-developed and well-nourished. HENT:   Head: Normocephalic and atraumatic. Eyes: Conjunctivae are normal.   Neck: Neck supple. No JVD present. No tracheal deviation present. No thyromegaly present. Cardiovascular: Normal rate, regular rhythm and normal heart sounds. Exam reveals no gallop and no friction rub. No murmur heard. Pulmonary/Chest: Breath sounds normal. No respiratory distress. He has no wheezes. He has no rales. He exhibits no tenderness. Abdominal: Soft. There is no tenderness. Musculoskeletal: He exhibits no edema. Neurological: He is alert and oriented to person, place, and time. Skin: Skin is warm and dry. Psychiatric: He has a normal mood and affect. Mr. Tho Cesar has a reminder for a \"due or due soon\" health maintenance. I have asked that he contact his primary care provider for follow-up on this health maintenance. No flowsheet data found. I have personally reviewed patient's records available from hospital and other providers and incorporated findings in patient care. SUMMARY:echo:12/2015  Left ventricle: Systolic function was normal. Ejection fraction was  estimated to be 55 %. There were no regional wall motion abnormalities. Left ventricular diastolic function parameters were normal.    COMPARISONS:  Comparison was made with the previous study of 02-Jun-2015. LV overall  function has increased from 45 % to 55 %    Assessment         ICD-10-CM ICD-9-CM    1.  Essential hypertension with goal blood pressure less than 140/90 I10 401.9     Blood pressure well controlled continue treatment   2. Cardiomyopathy, unspecified type ([facility] I42.9 425.4     Stable. Maintain on Coreg and lisinopril   3. Morbid obesity ([facility] E66.01 278.01     Continue diet and exercise. No orders of the defined types were placed in this encounter. Follow-up Disposition:  Return in about 6 months (around 4/3/2019).

## 2018-10-03 NOTE — MR AVS SNAPSHOT
[address] [facility] [address] 
 
 
 [address], [address] [facility] [address] [phone] Patient: Annie Tarango MRN: [id number] RTM:[birth year] Visit Information Date & Time Provider Department Dept. Phone Encounter #  
 10/3/2018  9:30 AM Patrick Frankel MD [facility] [address] [place] [address] [id number] Follow-up Instructions Return in about 6 months (around 4/3/2019). Your Appointments 4/17/2019  9:45 AM  
Office Visit with Patrick Frankel MD  
[facility]) Appt Note: 6 months [address], [address] [facility] [id number] [address]millan, [address] Upcoming Health Maintenance Date Due Pneumococcal 19-64 Medium Risk (1 of 1 - PPSV23) 4/4/2013 DTaP/Tdap/Td series (2 - Td) 1/1/2015 MEDICARE YEARLY EXAM 5/16/2018 Influenza Age 5 to Adult 8/1/2018 Allergies as of 10/3/2018  Review Complete On: 10/3/2018 By: Patrick Frankel MD  
  
 Severity Noted Reaction Type Reactions Penicillins High 05/05/2014    Unable to Obtain Current Immunizations  Reviewed on 2/5/2018 No immunizations on file. Not reviewed this visit You Were Diagnosed With   
  
 Codes Comments Essential hypertension with goal blood pressure less than 140/90    -  Primary ICD-10-CM: I10 
ICD-9-CM: 401.9 Blood pressure well controlled continue treatment Cardiomyopathy, unspecified type ([address]     ICD-10-CM: I42.9 ICD-9-CM: 425.4 Stable. Maintain on Coreg and lisinopril Morbid obesity ([address]     ICD-10-CM: E66.01 
ICD-9-CM: 278.01 Continue diet and exercise. Vitals BP Pulse Height(growth percentile) Weight(growth percentile) BMI Smoking Status 122/62 79 5' 9\" (1.753 m) (!) 360 lb (163.3 kg) 53.16 kg/m2 Never Smoker Vitals History BMI and BSA Data  Body Mass Index Body Surface Area  
 53.16 kg/m 2 2.82 m 2  
  
  
 Preferred Pharmacy Pharmacy Name Phone [address], [address] [facility] [phone] Your Updated Medication List  
  
   
This list is accurate as of 10/3/18 10:06 AM.  Always use your most recent med list.  
  
  
  
  
 albuterol 90 mcg/actuation inhaler Commonly known as:  VENTOLIN HFA Take 2 Puffs by inhalation every six (6) hours as needed. carvedilol 6.25 mg tablet Commonly known as:  Gearldine Sers Take 1 Tab by mouth two (2) times daily (with meals). Indications: hypertension  
  
 cyclobenzaprine 10 mg tablet Commonly known as:  FLEXERIL Take 1 Tab by mouth nightly. gemfibrozil 600 mg tablet Commonly known as:  LOPID Take 1 Tab by mouth two (2) times a day.  
  
 ketoconazole 2 % shampoo Commonly known as:  NIZORAL Apply as shampoo 3 times per week. lisinopril 2.5 mg tablet Commonly known as:  Val Resides Take 1 Tab by mouth daily. Indications: hypertension  
  
 naproxen 500 mg tablet Commonly known as:  NAPROSYN Take 1 Tab by mouth two (2) times daily (with meals). Indications: Pain  
  
 tiotropium bromide 1.25 mcg/actuation inhaler Commonly known as:  Leartis [place] Take 2 Puffs by inhalation daily. Indications: MAINTENANCE THERAPY FOR ASTHMA  
  
 triamcinolone acetonide 0.1 % topical cream  
Commonly known as:  KENALOG Apply  to affected area two (2) times daily as needed for Skin Irritation. Follow-up Instructions Return in about 6 months (around 4/3/2019). Introducing [facility]! Shakila Marie introduces [employer] patient portal. Now you can access parts of your medical record, email your doctor's office, and request medication refills online. 1. In your internet browser, go to [email or web address] 2. Click on the First Time User? Click Here link in the Sign In box. You will see the New Member Sign Up page. 3. Enter your [employer] Access Code exactly as it appears below. You will not need to use this code after youve completed the sign-up process. If you do not sign up before the expiration date, you must request a new code. · [employer] Access Code: [email or web address] Expires: 1/1/2019  9:37 AM 
 
4. Enter the last four digits of your Social Security Number (xxxx) and Date of Birth (mm/dd/yyyy) as indicated and click Submit. You will be taken to the next sign-up page. 5. Create a [employer] ID. This will be your [employer] login ID and cannot be changed, so think of one that is secure and easy to remember. 6. Create a [employer] password. You can change your password at any time. 7. Enter your Password Reset Question and Answer. This can be used at a later time if you forget your password. 8. Enter your e-mail address. You will receive e-mail notification when new information is available in [address] 9. Click Sign Up. You can now view and download portions of your medical record. 10. Click the Download Summary menu link to download a portable copy of your medical information. If you have questions, please visit the Frequently Asked Questions section of the [employer] website. Remember, [employer] is NOT to be used for urgent needs. For medical emergencies, dial 911. Now available from your iPhone and Android! Please provide this summary of care documentation to your next provider. Your primary care clinician is listed as Elijah Daniel. If you have any questions after today's visit, please call [phone].

## 2018-10-03 NOTE — PROGRESS NOTES
Patient didn't bring medications, verbally reviewed    1. Have you been to the ER, urgent care clinic since your last visit? Hospitalized since your last visit? No    2. Have you seen or consulted any other health care providers outside of the [facility] since your last visit? Include any pap smears or colon screening.  No

## 2018-10-03 NOTE — LETTER
Domingo Austin 1995 
 
10/3/2018 Dear Joselo Moser MD 
 
I had the pleasure of evaluating  Mr. Blanca Mandel in office today. Below are the relevant portions of my assessment and plan of care. ICD-10-CM ICD-9-CM 1. Essential hypertension with goal blood pressure less than 140/90 I10 401.9 Blood pressure well controlled continue treatment 2. Cardiomyopathy, unspecified type ([address] I42.9 425.4 Stable. Maintain on Coreg and lisinopril 3. Morbid obesity ([address] E66.01 278.01 Continue diet and exercise. Current Outpatient Prescriptions Medication Sig Dispense Refill  naproxen (NAPROSYN) 500 mg tablet Take 1 Tab by mouth two (2) times daily (with meals). Indications: Pain 60 Tab 11  
 cyclobenzaprine (FLEXERIL) 10 mg tablet Take 1 Tab by mouth nightly. 30 Tab 11  
 ketoconazole (NIZORAL) 2 % shampoo Apply as shampoo 3 times per week. 120 mL 11  
 triamcinolone acetonide (KENALOG) 0.1 % topical cream Apply  to affected area two (2) times daily as needed for Skin Irritation. 15 g 11  
 lisinopril (PRINIVIL, ZESTRIL) 2.5 mg tablet Take 1 Tab by mouth daily. Indications: hypertension 30 Tab 11  
 carvedilol (COREG) 6.25 mg tablet Take 1 Tab by mouth two (2) times daily (with meals). Indications: hypertension 60 Tab 11  
 tiotropium bromide (SPIRIVA RESPIMAT) 1.25 mcg/actuation inhaler Take 2 Puffs by inhalation daily. Indications: MAINTENANCE THERAPY FOR ASTHMA 1 Inhaler 11  
 gemfibrozil (LOPID) 600 mg tablet Take 1 Tab by mouth two (2) times a day. 60 Tab 11  
 albuterol (VENTOLIN HFA) 90 mcg/actuation inhaler Take 2 Puffs by inhalation every six (6) hours as needed. 1 Inhaler 5 No orders of the defined types were placed in this encounter. If you have questions, please do not hesitate to call me. I look forward to following Mr. Blanca Mandel along with you. Sincerely, Rodriguez Soliz MD

## 2018-11-28 ENCOUNTER — OFFICE VISIT (OUTPATIENT)
Dept: ORTHOPEDIC SURGERY | Facility: CLINIC | Age: 23
End: 2018-11-28

## 2018-11-28 VITALS
TEMPERATURE: 95.8 F | RESPIRATION RATE: 16 BRPM | DIASTOLIC BLOOD PRESSURE: 92 MMHG | HEIGHT: 69 IN | HEART RATE: 87 BPM | WEIGHT: 315 LBS | OXYGEN SATURATION: 99 % | BODY MASS INDEX: 46.65 KG/M2 | SYSTOLIC BLOOD PRESSURE: 119 MMHG

## 2018-11-28 DIAGNOSIS — M75.51 ACUTE BURSITIS OF RIGHT SHOULDER: ICD-10-CM

## 2018-11-28 DIAGNOSIS — T14.8XXA SPRAIN AND STRAIN: ICD-10-CM

## 2018-11-28 DIAGNOSIS — M25.512 BILATERAL SHOULDER PAIN, UNSPECIFIED CHRONICITY: Primary | ICD-10-CM

## 2018-11-28 DIAGNOSIS — M25.511 BILATERAL SHOULDER PAIN, UNSPECIFIED CHRONICITY: Primary | ICD-10-CM

## 2018-11-28 RX ORDER — TRIAMCINOLONE ACETONIDE 40 MG/ML
40 INJECTION, SUSPENSION INTRA-ARTICULAR; INTRAMUSCULAR ONCE
Qty: 1 ML | Refills: 0
Start: 2018-11-28 | End: 2018-11-28

## 2018-11-28 NOTE — PROGRESS NOTES
HISTORY OF PRESENT ILLNESS:  Bari Mello is a 54-year-old, severely morbidly obese,  male who comes in to the office today with his mother complaining of right shoulder pain. He was apparently removing a bed from a box yesterday when he felt pain to the right shoulder. He has had a history of bilateral shoulder pain and has been treated in the past by Dr. Lizzie Payan for bilateral shoulder bursitis. The pain to the right shoulder had been prior to his event with the box on the 27th, and he has been considering coming into the office for a cortisone injection about a week and a half before this occurred yesterday. The event yesterday has apparently worsened his symptoms and now, he has difficulty moving the arm up above his shoulder line and back behind his back. REVIEW OF SYSTEMS:   No chest pain, no shortness of breath, other than heavy exertional type. No fever, chills, or night sweats. No rash and no itching. No nausea or vomiting. Pain to the shoulder with motion forward and to the side, as well as putting his hand behind his back to reach his back pocket. He has no history of trauma to the right shoulder. PHYSICAL EXAM:  He is a severely, morbidly obese,  male, atraumatic, normocephalic, alert and oriented times three sitting on the table comfortably. He is joined by his mother today. Examination of the right shoulder reveals no fracture deformity, lesions, masses, or step-offs. He does have pain palpable at the distal [facility] joint. His forward flexion actively is 110°, which he can hold against resistance. His glenohumeral abduction actively is 90°. He can also hold against resistance. He has pain through both planes of motion beginning at about 80° and through to end point. His passive external rotation is 30° with moderate pain reproduced to the shoulder.   His internal rotation is poor today with the patient guarding and providing little effort and limited to the posterior aspect of the right iliac crest.  He does have a positive empty can sign. RADIOGRAPHS:  X-ray of the right shoulder today reveals early glenohumeral osteoarthritis and early AC joint arthritis. There is no fracture deformities or other osseous lesions noted. IMPRESSION:      1. Right shoulder strain. 2. Right shoulder recurrent bursitis. 3. Decreased range of motion of the right shoulder secondary to above. 4. Morbid obesity. PROCEDURE:  Today, using sterile technique, after verbal and written consent were obtained and appropriate time out performed, 1 of Kenalog at 40 mg per mL mixed with 7 mL of Sensorcaine 0.75% was injected in right shoulder using the posterior subacromial approach. There were no complications. The patient tolerated the procedure well. PLAN:   I am currently recommending a low-dose cortisone injection for his symptoms associated with his right shoulder recurrent bursitis and strain, but I would also like to place him in physical therapy for strengthening and range of motion involving the shoulder two times weekly times three weeks. We are going to plan on seeing the patient back on a prn basis. Today, all of his and his mothers questions were answered to their satisfaction. A copy of their x-rays was reviewed and provided. He is going to limit his push, pull, lift, carry to no more than three to five pounds with the right upper extremity solely.

## 2019-02-07 DIAGNOSIS — E78.5 HYPERLIPIDEMIA, UNSPECIFIED HYPERLIPIDEMIA TYPE: ICD-10-CM

## 2019-02-07 DIAGNOSIS — I10 ESSENTIAL HYPERTENSION WITH GOAL BLOOD PRESSURE LESS THAN 140/90: ICD-10-CM

## 2019-02-07 RX ORDER — CARVEDILOL 6.25 MG/1
6.25 TABLET ORAL 2 TIMES DAILY WITH MEALS
Qty: 180 TAB | Refills: 1 | Status: SHIPPED | OUTPATIENT
Start: 2019-02-07 | End: 2019-07-30 | Stop reason: SDUPTHER

## 2019-02-07 RX ORDER — LISINOPRIL 2.5 MG/1
2.5 TABLET ORAL DAILY
Qty: 90 TAB | Refills: 1 | Status: SHIPPED | OUTPATIENT
Start: 2019-02-07 | End: 2019-07-30 | Stop reason: SDUPTHER

## 2019-02-07 NOTE — TELEPHONE ENCOUNTER
Patient called after the request came on the fax asking if she made an appointment will  give her son a prescription to cover him until he is seen. She made him an appointment for 2/13/2019. Please advise.

## 2019-02-08 RX ORDER — GEMFIBROZIL 600 MG/1
600 TABLET, FILM COATED ORAL 2 TIMES DAILY
Qty: 60 TAB | Refills: 5 | Status: SHIPPED | OUTPATIENT
Start: 2019-02-08 | End: 2019-02-13 | Stop reason: SDUPTHER

## 2019-02-13 ENCOUNTER — OFFICE VISIT (OUTPATIENT)
Dept: FAMILY MEDICINE CLINIC | Facility: CLINIC | Age: 24
End: 2019-02-13

## 2019-02-13 VITALS
WEIGHT: 315 LBS | BODY MASS INDEX: 46.65 KG/M2 | DIASTOLIC BLOOD PRESSURE: 77 MMHG | HEART RATE: 95 BPM | SYSTOLIC BLOOD PRESSURE: 140 MMHG | RESPIRATION RATE: 18 BRPM | OXYGEN SATURATION: 93 % | TEMPERATURE: 95.9 F | HEIGHT: 69 IN

## 2019-02-13 DIAGNOSIS — M54.50 BILATERAL LOW BACK PAIN WITHOUT SCIATICA, UNSPECIFIED CHRONICITY: ICD-10-CM

## 2019-02-13 DIAGNOSIS — L21.9 SEBORRHEIC DERMATITIS: ICD-10-CM

## 2019-02-13 DIAGNOSIS — E55.9 VITAMIN D DEFICIENCY: ICD-10-CM

## 2019-02-13 DIAGNOSIS — E78.5 HYPERLIPIDEMIA, UNSPECIFIED HYPERLIPIDEMIA TYPE: ICD-10-CM

## 2019-02-13 DIAGNOSIS — E66.01 MORBID OBESITY (HCC): ICD-10-CM

## 2019-02-13 DIAGNOSIS — J45.40 MODERATE PERSISTENT ASTHMA WITHOUT COMPLICATION: ICD-10-CM

## 2019-02-13 DIAGNOSIS — I10 ESSENTIAL HYPERTENSION WITH GOAL BLOOD PRESSURE LESS THAN 140/90: Primary | ICD-10-CM

## 2019-02-13 DIAGNOSIS — I42.9 CARDIOMYOPATHY, UNSPECIFIED TYPE (HCC): ICD-10-CM

## 2019-02-13 RX ORDER — CYCLOBENZAPRINE HCL 10 MG
10 TABLET ORAL
Qty: 30 TAB | Refills: 11 | Status: SHIPPED | OUTPATIENT
Start: 2019-02-13 | End: 2019-07-30 | Stop reason: ALTCHOICE

## 2019-02-13 RX ORDER — ALBUTEROL SULFATE 90 UG/1
2 AEROSOL, METERED RESPIRATORY (INHALATION)
Qty: 1 INHALER | Refills: 5 | Status: SHIPPED | OUTPATIENT
Start: 2019-02-13 | End: 2020-11-24 | Stop reason: SDUPTHER

## 2019-02-13 RX ORDER — KETOCONAZOLE 20 MG/ML
SHAMPOO TOPICAL
Qty: 120 ML | Refills: 11 | Status: SHIPPED | OUTPATIENT
Start: 2019-02-13 | End: 2020-11-24 | Stop reason: SDUPTHER

## 2019-02-13 RX ORDER — GEMFIBROZIL 600 MG/1
600 TABLET, FILM COATED ORAL 2 TIMES DAILY
Qty: 60 TAB | Refills: 5 | Status: SHIPPED | OUTPATIENT
Start: 2019-02-13 | End: 2019-07-30 | Stop reason: SDUPTHER

## 2019-02-13 NOTE — PROGRESS NOTES
HISTORY OF PRESENT ILLNESS Jeff Jensen is a 24 y.o. male. Seen in follow-up for HTN, Vitamin D deficiency, asthma, seborrheic dermatitis of scalp, morbid obesity, DJD in multiple joints. No problems with medications. He has been seeing Ortho about his knees - got one injection, which helped. He needs some refills today. He didn't have any labs drawn. No problems with his asthma. He is interested in getting more specific dietary guidelines for weight loss. Review of Systems Constitutional: Negative for chills and fever. Respiratory: Negative for shortness of breath. Cardiovascular: Negative for chest pain, palpitations and leg swelling. Gastrointestinal: Negative for nausea and vomiting. Neurological: Negative for tingling, focal weakness and headaches. Psychiatric/Behavioral: The patient does not have insomnia. Visit Vitals /77 Pulse 95 Temp 95.9 °F (35.5 °C) (Oral) Resp 18 Ht 5' 9\" (1.753 m) Wt (!) 365 lb (165.6 kg) SpO2 93% BMI 53.90 kg/m² Physical Exam  
Constitutional: He is oriented to person, place, and time. He appears well-developed and well-nourished. No distress. Neck: Neck supple. No thyromegaly present. Carotid bruit absent Cardiovascular: Normal rate, regular rhythm and intact distal pulses. Exam reveals no gallop and no friction rub. No murmur heard. Pulmonary/Chest: Effort normal and breath sounds normal. No respiratory distress. Musculoskeletal: He exhibits no edema. Lymphadenopathy:  
  He has no cervical adenopathy. Neurological: He is alert and oriented to person, place, and time. Skin: Skin is warm and dry. Psychiatric: He has a normal mood and affect. His behavior is normal. Judgment and thought content normal.  
Nursing note and vitals reviewed. ASSESSMENT and PLAN 
  ICD-10-CM ICD-9-CM 1. Essential hypertension with goal blood pressure less than 140/90 I10 401.9 2. Hyperlipidemia, unspecified hyperlipidemia type E78.5 272.4 gemfibrozil (LOPID) 600 mg tablet LIPID PANEL  
   METABOLIC PANEL, COMPREHENSIVE 3. Bilateral low back pain without sciatica, unspecified chronicity M54.5 724.2 cyclobenzaprine (FLEXERIL) 10 mg tablet 4. Morbid obesity ([facility] [address] E66.01 278.01 REFERRAL TO PHYSICAL THERAPY 5. Moderate persistent asthma without complication E20.40 718.05 tiotropium bromide (SPIRIVA RESPIMAT) 1.25 mcg/actuation inhaler  
   albuterol (VENTOLIN HFA) 90 mcg/actuation inhaler 6. Vitamin D deficiency E55.9 268.9 VITAMIN D, 25 HYDROXY 7. Seborrheic dermatitis L21.9 690.10 ketoconazole (NIZORAL) 2 % shampoo 8. Cardiomyopathy, unspecified type ([facility] I42.9 425.4 Follow-up Disposition: 
Return in about 3 months (around 5/13/2019). current treatment plan is effective, no change in therapy - refills as noted. lab results and schedule of future lab studies reviewed with patient 
reviewed diet, exercise and weight control - referral to Dietician 
reviewed medications and side effects in detail Plan of care reviewed - patient verbalize(s) understanding and agreement.

## 2019-04-03 ENCOUNTER — OFFICE VISIT (OUTPATIENT)
Dept: FAMILY MEDICINE CLINIC | Facility: CLINIC | Age: 24
End: 2019-04-03

## 2019-04-03 VITALS
OXYGEN SATURATION: 99 % | BODY MASS INDEX: 46.65 KG/M2 | WEIGHT: 315 LBS | SYSTOLIC BLOOD PRESSURE: 136 MMHG | HEART RATE: 90 BPM | RESPIRATION RATE: 20 BRPM | HEIGHT: 69 IN | TEMPERATURE: 96.4 F | DIASTOLIC BLOOD PRESSURE: 62 MMHG

## 2019-04-03 DIAGNOSIS — J06.9 VIRAL UPPER RESPIRATORY TRACT INFECTION: ICD-10-CM

## 2019-04-03 DIAGNOSIS — I10 ESSENTIAL HYPERTENSION WITH GOAL BLOOD PRESSURE LESS THAN 140/90: Primary | ICD-10-CM

## 2019-04-03 DIAGNOSIS — E66.01 MORBID OBESITY (HCC): ICD-10-CM

## 2019-04-03 DIAGNOSIS — I42.9 CARDIOMYOPATHY, UNSPECIFIED TYPE (HCC): ICD-10-CM

## 2019-04-03 RX ORDER — GUAIFENESIN 600 MG/1
600 TABLET, EXTENDED RELEASE ORAL 2 TIMES DAILY
Qty: 20 TAB | Refills: 1 | Status: SHIPPED | OUTPATIENT
Start: 2019-04-03 | End: 2021-10-26

## 2019-04-03 RX ORDER — FLUTICASONE PROPIONATE 50 MCG
SPRAY, SUSPENSION (ML) NASAL
Qty: 1 BOTTLE | Refills: 4 | Status: SHIPPED | OUTPATIENT
Start: 2019-04-03

## 2019-04-03 NOTE — PROGRESS NOTES
Chas Calle is a 24 y.o. male here for cold symptoms Chas Calle is a 24 y.o. male (: 1995) presenting to address: Chief Complaint Patient presents with  Cold Symptoms  
  pt states he's had a cough, congestion, hot sweats at times Vitals:  
 19 1005 Pulse: 90 Resp: 20 Temp: 96.4 °F (35.8 °C) TempSrc: Oral  
SpO2: 99% Weight: (!) 370 lb 6.4 oz (168 kg) Height: 5' 9\" (1.753 m) PainSc:   0 - No pain Hearing/Vision: No exam data present Learning Assessment:  
 
Learning Assessment 2018 PRIMARY LEARNER Patient HIGHEST LEVEL OF EDUCATION - PRIMARY LEARNER  GRADUATED HIGH SCHOOL OR GED  
BARRIERS PRIMARY LEARNER NONE  
CO-LEARNER CAREGIVER Yes CO-LEARNER NAME mother PRIMARY LANGUAGE ENGLISH  
LEARNER PREFERENCE PRIMARY OTHER (COMMENT) ANSWERED BY self RELATIONSHIP SELF Depression Screening:  
 
3 most recent PHQ Screens 4/3/2019 PHQ Not Done - Little interest or pleasure in doing things Not at all Feeling down, depressed, irritable, or hopeless Not at all Total Score PHQ 2 0 Fall Risk Assessment:  
No flowsheet data found. Abuse Screening: No flowsheet data found. Coordination of Care Questionaire: 1. Have you been to the ER, urgent care clinic since your last visit? Hospitalized since your last visit? NO 
 
2. Have you seen or consulted any other health care providers outside of the [facility] since your last visit? Include any pap smears or colon screening. NO Advanced Directive: 1. Do you have an Advanced Directive? NO 
 
2. Would you like information on Advanced Directives?  NO

## 2019-04-03 NOTE — PROGRESS NOTES
04/03/19 
10:09 AM 
had concerns including Cold Symptoms (pt states he's had a cough, congestion, hot sweats at times for a few days). HISTORY OF PRESENT ILLNESS This is a 24 y.o. male who presents with upper respiratory infection symptoms. Upper respiratory symptoms One week history of nasal congestion, and cough which made tthe back hurt,lips chaps. No recent nose bleeds, in the past years. He has used Tylenol without relief. Jaw locked up from cough. At one point he has had Tylenol This is improved The patient has a history of cardiomyopathy this is improved. He has no chest pressure or palpitations. No orthopnea is admitted. Reviewed his ECHO  Reviewed with him Obesity Working on losing his weight we reviewed his diet with him. Current Outpatient Medications:   cyclobenzaprine (FLEXERIL) 10 mg tablet, Take 1 Tab by mouth nightly., Disp: 30 Tab, Rfl: 11 
  gemfibrozil (LOPID) 600 mg tablet, Take 1 Tab by mouth two (2) times a day., Disp: 60 Tab, Rfl: 5 
  tiotropium bromide (SPIRIVA RESPIMAT) 1.25 mcg/actuation inhaler, Take 2 Puffs by inhalation daily. , Disp: 1 Inhaler, Rfl: 11 
  albuterol (VENTOLIN HFA) 90 mcg/actuation inhaler, Take 2 Puffs by inhalation every six (6) hours as needed. , Disp: 1 Inhaler, Rfl: 5 
  ketoconazole (NIZORAL) 2 % shampoo, Apply as shampoo 3 times per week., Disp: 120 mL, Rfl: 11 
  carvedilol (COREG) 6.25 mg tablet, Take 1 Tab by mouth two (2) times daily (with meals). , Disp: 180 Tab, Rfl: 1 
  lisinopril (PRINIVIL, ZESTRIL) 2.5 mg tablet, Take 1 Tab by mouth daily. , Disp: 90 Tab, Rfl: 1 
  naproxen (NAPROSYN) 500 mg tablet, Take 1 Tab by mouth two (2) times daily (with meals). Indications: Pain, Disp: 60 Tab, Rfl: 11 
  triamcinolone acetonide (KENALOG) 0.1 % topical cream, Apply  to affected area two (2) times daily as needed for Skin Irritation. , Disp: 15 g, Rfl: 11 Allergies Allergen Reactions  Penicillins Unable to Obtain Active Ambulatory Problems Diagnosis Date Noted  Palpitations 05/05/2014  Shortness of breath 05/05/2014  Other specified cardiac dysrhythmias(427.89) 05/05/2014  Cardiomyopathy ([facility] 09/10/2015  Essential hypertension with goal blood pressure less than 140/90 07/28/2016  Morbid obesity ([facility] 09/07/2017  Seborrheic dermatitis 02/05/2018  Vitamin D deficiency 02/05/2018  Moderate persistent asthma without complication 85/28/5769  Bilateral low back pain without sciatica 02/05/2018 Resolved Ambulatory Problems Diagnosis Date Noted  No Resolved Ambulatory Problems Past Medical History:  
Diagnosis Date  Asthma 5/5/2014  Hypertension  Other specified cardiac dysrhythmias(427.89) 5/5/2014  Palpitations 5/5/2014  Shortness of breath 5/5/2014 Review of Systems Constitutional: Negative for chills and fever. HENT: Positive for congestion and sinus pain. Negative for ear discharge, ear pain and nosebleeds. Respiratory: Negative for shortness of breath. Cardiovascular: Negative for chest pain, palpitations and leg swelling. Gastrointestinal: Negative for nausea and vomiting. Skin: Positive for rash. Seborrhea Neurological: Negative for tingling, focal weakness and headaches. Psychiatric/Behavioral: The patient does not have insomnia. Results for orders placed or performed during the hospital encounter of 04/02/16 POC CHEM8 Result Value Ref Range CO2, POC 23 19 - 24 MMOL/L Glucose,  (H) 74 - 106 MG/DL  
 BUN, POC 12 7 - 18 MG/DL Creatinine, POC 1.0 0.6 - 1.3 MG/DL  
 GFRAA, POC >60 >60 ml/min/1.73m2 GFRNA, POC >60 >60 ml/min/1.73m2 Sodium,  136 - 145 MMOL/L Potassium, POC 4.2 3.5 - 5.5 MMOL/L Calcium, ionized (POC) 1.23 1.12 - 1.32 MMOL/L Chloride,  100 - 108 MMOL/L Anion gap, POC 20 10 - 20 Hematocrit, POC 42 36 - 49 %  Hemoglobin, POC 14.3 12 - 16 G/DL  
 
 
 Visit Vitals /62 (BP 1 Location: Right arm, BP Patient Position: Sitting) Pulse 90 Temp 96.4 °F (35.8 °C) (Oral) Resp 20 Ht 5' 9\" (1.753 m) Wt (!) 370 lb 6.4 oz (168 kg) SpO2 99% BMI 54.70 kg/m² Physical Exam  
Constitutional: He is oriented to person, place, and time. He appears well-developed and well-nourished. No distress. HENT:  
Nasal congestion is noted with difficulty breathing and. Orally there are no lesions in the ears look normal.  
Neck: Neck supple. No thyromegaly present. Carotid bruit absent Cardiovascular: Normal rate, regular rhythm and intact distal pulses. Exam reveals no gallop and no friction rub. No murmur heard. Pulmonary/Chest: Effort normal and breath sounds normal. No respiratory distress. Musculoskeletal: He exhibits no edema. Lymphadenopathy:  
  He has no cervical adenopathy. Neurological: He is alert and oriented to person, place, and time. Skin: Skin is warm and dry. Psychiatric: He has a normal mood and affect. His behavior is normal. Judgment and thought content normal.  
Nursing note and vitals reviewed. MDM Number of Diagnoses or Management Options Cardiomyopathy, unspecified type [facility]):  
Essential hypertension with goal blood pressure less than 140/90:  
Morbid obesity ([address]):  
Viral upper respiratory tract infection:  
Diagnosis management comments: The patient has a viral upper infection primarily with nasal congestion mild cough is noted. Will order Flonase and Mucinex. Essential hypertension well-controlled next number morbid obesity we discussed diet and exercise he is already exercise. Number cardiomyopathy reviewed his last lab test to follow. ASSESSMENT and PLAN 
  ICD-10-CM ICD-9-CM 1. Essential hypertension with goal blood pressure less than 140/90 I10 401.9 guaiFENesin ER (MUCINEX) 600 mg ER tablet  
   fluticasone propionate (FLONASE) 50 mcg/actuation nasal spray Good control, follow 2. Cardiomyopathy, unspecified type ([facility] I42.9 425.4 Stable, improved, follow 3. Morbid obesity ([facility] E66.01 278.01 Discussed diet He walks and bikes Follow 4. Viral upper respiratory tract infection J06.9 465.9 Mucinex Nasonex Tylenol Follow-up and Dispositions · Return in about 3 months (around 7/3/2019).

## 2019-04-03 NOTE — PATIENT INSTRUCTIONS

## 2019-04-26 ENCOUNTER — APPOINTMENT (OUTPATIENT)
Dept: NUTRITION | Age: 24
End: 2019-04-26

## 2019-05-29 ENCOUNTER — OFFICE VISIT (OUTPATIENT)
Dept: CARDIOLOGY CLINIC | Age: 24
End: 2019-05-29

## 2019-05-29 VITALS
BODY MASS INDEX: 46.65 KG/M2 | HEIGHT: 69 IN | DIASTOLIC BLOOD PRESSURE: 70 MMHG | SYSTOLIC BLOOD PRESSURE: 112 MMHG | WEIGHT: 315 LBS | HEART RATE: 78 BPM

## 2019-05-29 DIAGNOSIS — I10 ESSENTIAL HYPERTENSION WITH GOAL BLOOD PRESSURE LESS THAN 140/90: Primary | ICD-10-CM

## 2019-05-29 DIAGNOSIS — E78.5 HYPERLIPIDEMIA, UNSPECIFIED HYPERLIPIDEMIA TYPE: ICD-10-CM

## 2019-05-29 DIAGNOSIS — E66.01 MORBID OBESITY (HCC): ICD-10-CM

## 2019-05-29 DIAGNOSIS — I42.9 CARDIOMYOPATHY, UNSPECIFIED TYPE (HCC): ICD-10-CM

## 2019-05-29 NOTE — PROGRESS NOTES
HISTORY OF PRESENT ILLNESS  Stevan Song is a 21 y.o. male. Cardiomyopathy   The history is provided by the patient. This is a chronic problem. The problem has been resolved. Associated symptoms include shortness of breath. Pertinent negatives include no chest pain, no abdominal pain and no headaches. Hypertension   The history is provided by the patient. This is a chronic problem. The problem has not changed since onset. Associated symptoms include shortness of breath. Pertinent negatives include no chest pain, no abdominal pain and no headaches. Palpitations    Associated symptoms include shortness of breath. Pertinent negatives include no fever, no chest pain, no claudication, no orthopnea, no PND, no abdominal pain, no nausea, no vomiting, no headaches, no dizziness, no weakness, no cough, no hemoptysis and no sputum production. His past medical history is significant for hypertension. Shortness of Breath   The history is provided by the patient. This is a recurrent problem. The problem occurs intermittently. The problem has been rapidly improving. Pertinent negatives include no fever, no headaches, no cough, no sputum production, no hemoptysis, no wheezing, no PND, no orthopnea, no chest pain, no vomiting, no abdominal pain, no rash, no leg swelling and no claudication. Review of Systems   Constitutional: Negative for chills and fever. HENT: Negative for nosebleeds. Eyes: Negative for blurred vision and double vision. Respiratory: Positive for shortness of breath. Negative for cough, hemoptysis, sputum production and wheezing. Cardiovascular: Positive for palpitations. Negative for chest pain, orthopnea, claudication, leg swelling and PND. Gastrointestinal: Negative for abdominal pain, heartburn, nausea and vomiting. Musculoskeletal: Negative for myalgias. Skin: Negative for rash. Neurological: Negative for dizziness, weakness and headaches.    Endo/Heme/Allergies: Does not bruise/bleed easily. Family History   Problem Relation Age of Onset    Diabetes Mother     Hypertension Mother     Heart Attack Father 48    Hypertension Sister     Cancer Maternal Aunt     Stroke Maternal Grandmother     Stroke Maternal Grandfather     Heart Surgery Neg Hx        Past Medical History:   Diagnosis Date    Asthma 5/5/2014    possible asthma r/o cardiac etiology h/o chest trauma as a child     Hypertension     Other specified cardiac dysrhythmias(427.89) 5/5/2014    marked sinus bradycardia     Palpitations 5/5/2014    r/o arrythmia     Shortness of breath 5/5/2014    possible asthma r/o cardiac etiology h/o chest trauma as a child        Past Surgical History:   Procedure Laterality Date    HX ADENOIDECTOMY         Social History     Tobacco Use    Smoking status: Never Smoker    Smokeless tobacco: Never Used   Substance Use Topics    Alcohol use: No     Alcohol/week: 0.0 oz       Allergies   Allergen Reactions    Penicillins Unable to Obtain           Visit Vitals  /70   Pulse 78   Ht 5' 9\" (1.753 m)   Wt (!) 170.1 kg (375 lb)   BMI 55.38 kg/m²         Physical Exam   Constitutional: He is oriented to person, place, and time. He appears well-developed and well-nourished. HENT:   Head: Normocephalic and atraumatic. Eyes: Conjunctivae are normal.   Neck: Neck supple. No JVD present. No tracheal deviation present. No thyromegaly present. Cardiovascular: Normal rate, regular rhythm and normal heart sounds. Exam reveals no gallop and no friction rub. No murmur heard. Pulmonary/Chest: Breath sounds normal. No respiratory distress. He has no wheezes. He has no rales. He exhibits no tenderness. Abdominal: Soft. There is no tenderness. Musculoskeletal: He exhibits no edema. Neurological: He is alert and oriented to person, place, and time. Skin: Skin is warm and dry. Psychiatric: He has a normal mood and affect.      Mr. Marbin Be has a reminder for a \"due or due soon\" health maintenance. I have asked that he contact his primary care provider for follow-up on this health maintenance. No flowsheet data found. I have personally reviewed patient's records available from hospital and other providers and incorporated findings in patient care. SUMMARY:echo:12/2015  Left ventricle: Systolic function was normal. Ejection fraction was  estimated to be 55 %. There were no regional wall motion abnormalities. Left ventricular diastolic function parameters were normal.    COMPARISONS:  Comparison was made with the previous study of 02-Jun-2015. LV overall  function has increased from 45 % to 55 %    Assessment         ICD-10-CM ICD-9-CM    1. Essential hypertension with goal blood pressure less than 140/90 I10 401.9 ECHO ADULT COMPLETE      LIPID PANEL      METABOLIC PANEL, BASIC    Stable continue current medication   2. Cardiomyopathy, unspecified type ([address] I42.9 425.4 ECHO ADULT COMPLETE      LIPID PANEL      METABOLIC PANEL, BASIC    LV function was improving in past..  Continue Coreg and lisinopril. Recheck echo   3. Morbid obesity ([address] E66.01 278.01     Continue diet exercise weight loss   4. Hyperlipidemia, unspecified hyperlipidemia type E78.5 272.4     Patient on Lopid. [address] lab. Decide on additional medication         Orders Placed This Encounter    LIPID PANEL     Standing Status:   Future     Standing Expiration Date:   26/15/3260    METABOLIC PANEL, BASIC     Standing Status:   Future     Standing Expiration Date:   6/28/2019       Follow-up and Dispositions    · Return in about 3 months (around 8/29/2019).

## 2019-05-29 NOTE — PROGRESS NOTES
1. Have you been to the ER, urgent care clinic since your last visit? Hospitalized since your last visit? No    2. Have you seen or consulted any other health care providers outside of the [address] [place] [address] since your last visit? Include any pap smears or colon screening.  No

## 2019-06-21 ENCOUNTER — HOSPITAL ENCOUNTER (OUTPATIENT)
Dept: LAB | Age: 24
Discharge: HOME | End: 2019-06-21

## 2019-06-21 LAB — XX-LABCORP SPECIMEN COL,LCBCF: NORMAL

## 2019-06-21 PROCEDURE — 99001 SPECIMEN HANDLING PT-LAB: CPT

## 2019-06-22 LAB
BUN SERPL-MCNC: 16 MG/DL (ref 6–20)
BUN/CREAT SERPL: 20 (ref 9–20)
CALCIUM SERPL-MCNC: 9.1 MG/DL (ref 8.7–10.2)
CHLORIDE SERPL-SCNC: 103 MMOL/L (ref 96–106)
CHOLEST SERPL-MCNC: 151 MG/DL (ref 100–199)
CO2 SERPL-SCNC: 23 MMOL/L (ref 20–29)
CREAT SERPL-MCNC: 0.81 MG/DL (ref 0.76–1.27)
GLUCOSE SERPL-MCNC: 98 MG/DL (ref 65–99)
HDLC SERPL-MCNC: 24 MG/DL
LDLC SERPL CALC-MCNC: 113 MG/DL (ref 0–99)
POTASSIUM SERPL-SCNC: 4.6 MMOL/L (ref 3.5–5.2)
SODIUM SERPL-SCNC: 138 MMOL/L (ref 134–144)
SPECIMEN STATUS REPORT, ROLRST: NORMAL
TRIGL SERPL-MCNC: 72 MG/DL (ref 0–149)
VLDLC SERPL CALC-MCNC: 14 MG/DL (ref 5–40)

## 2019-06-24 ENCOUNTER — TELEPHONE (OUTPATIENT)
Dept: CARDIOLOGY CLINIC | Age: 24
End: 2019-06-24

## 2019-06-24 NOTE — TELEPHONE ENCOUNTER
----- Message from Fredrick Melendez MD sent at 6/24/2019 11:43 AM EDT -----  Low HDL and mild increase LDL continue current treatment.   Other labs normal

## 2019-06-24 NOTE — TELEPHONE ENCOUNTER
Patient called in concern to lab results received. Message left to make aware of lab results, noting low HDL, mild increase in LDL, other results normal. Patient asked to call the office if any questions or concerns.

## 2019-06-24 NOTE — TELEPHONE ENCOUNTER
Called and left message on patient voicemail regarding lab/test per Dr. Elyssa Tomas, low HDL and mild increase LDL continue current treatment. Other labs normal. If any questions to call the office.

## 2019-06-24 NOTE — TELEPHONE ENCOUNTER
----- Message from Philippe Hair MD sent at 6/24/2019 11:43 AM EDT -----  Low HDL and mild increase LDL continue current treatment.   Other labs normal

## 2019-07-29 NOTE — PROGRESS NOTES
07/30/19  10:09 AM  had concerns including Follow Up Chronic Condition ([facility]); Headache; and Medication Refill. HISTORY OF PRESENT ILLNESS   This is a 21 y.o. male who presents Right kneck pain as a new problem, and for follow-up on his old condition of hypertension with a cholesterol obesity and headache. Right neck pain  This has been present for 3 days. He has been having more frequent headaches, that is not a new type. It is relieved by Naproxen easily, he states. No neurologic symptoms are admitted to  Hypertension  He needs refills, No problems with themeds. No side effects noted The patient has no headaches, visual changes, chest pain or pressure,dyspnea, orthopnea, abdominal pain, dysuria, weakness, or paresthesias   Hyperlipidemia  He is tolerating the meds well. The patient denies muscle aches, headache, GI symptoms or difficulty with mentation. Obesity  Working on losing his weight we reviewed his diet with him. He walks and rides a bike for exercise. Encounters  On the last visit he was seen for essential hypertension upper respiratory infection cardiomyopathy with obesity and viral syndrome  ECHO  Due Friday. He was seen by cardiology for follow-up on his cardiomyopathy in May 2019 the problem was not felt to be improved. The echocardiogram lipid panel and metabolic panel ordered. Current Outpatient Medications:     lisinopril (PRINIVIL, ZESTRIL) 2.5 mg tablet, Take 1 Tab by mouth daily. Indications: high blood pressure, Disp: 90 Tab, Rfl: 1    carvedilol (COREG) 6.25 mg tablet, Take 1 Tab by mouth two (2) times daily (with meals). Indications: high blood pressure, Disp: 180 Tab, Rfl: 1    gemfibrozil (LOPID) 600 mg tablet, Take 1 Tab by mouth two (2) times a day., Disp: 60 Tab, Rfl: 5    methocarbamol (ROBAXIN) 500 mg tablet, Take 1 Tab by mouth three (3) times daily. , Disp: 10 Tab, Rfl: 0    fluticasone propionate (FLONASE) 50 mcg/actuation nasal spray, One squirt each nostril BID, Disp: 1 Bottle, Rfl: 4    tiotropium bromide (SPIRIVA RESPIMAT) 1.25 mcg/actuation inhaler, Take 2 Puffs by inhalation daily. , Disp: 1 Inhaler, Rfl: 11    albuterol (VENTOLIN HFA) 90 mcg/actuation inhaler, Take 2 Puffs by inhalation every six (6) hours as needed. , Disp: 1 Inhaler, Rfl: 5    ketoconazole (NIZORAL) 2 % shampoo, Apply as shampoo 3 times per week., Disp: 120 mL, Rfl: 11    naproxen (NAPROSYN) 500 mg tablet, Take 1 Tab by mouth two (2) times daily (with meals). Indications: Pain, Disp: 60 Tab, Rfl: 11    triamcinolone acetonide (KENALOG) 0.1 % topical cream, Apply  to affected area two (2) times daily as needed for Skin Irritation. , Disp: 15 g, Rfl: 11    guaiFENesin ER (MUCINEX) 600 mg ER tablet, Take 1 Tab by mouth two (2) times a day., Disp: 20 Tab, Rfl: 1  Allergies   Allergen Reactions    Penicillins Unable to Obtain     Active Ambulatory Problems     Diagnosis Date Noted    Palpitations 05/05/2014    Shortness of breath 05/05/2014    Other specified cardiac dysrhythmias(427.89) 05/05/2014    Cardiomy[facility] 09/10/2015    Essential hypertension with goal blood pressure less than 140/90 07/28/2016    Morbid obesity ([facility] 09/07/2017    Seborrheic dermatitis 02/05/2018    Vitamin D deficiency 02/05/2018    Moderate persistent asthma without complication 63/85/0724    Bilateral low back pain without sciatica 02/05/2018    Hyperlipidemia 05/29/2019     Resolved Ambulatory Problems     Diagnosis Date Noted    No Resolved Ambulatory Problems     Past Medical History:   Diagnosis Date    Asthma 5/5/2014    Hypertension        Review of Systems   Constitutional: Negative for chills and fever. HENT: Negative for congestion, ear discharge, ear pain, nosebleeds and sinus pain. Respiratory: Negative for shortness of breath. Cardiovascular: Negative for chest pain, palpitations and leg swelling. Gastrointestinal: Negative for nausea and vomiting. Genitourinary: Negative for dysuria. Musculoskeletal: Positive for myalgias and neck pain. Skin: Negative for rash. Seborrhea     Neurological: Negative for tingling, focal weakness and headaches. Psychiatric/Behavioral: The patient does not have insomnia. Results for orders placed or performed during the hospital encounter of 06/21/19   [facility] SPECIMEN COL   Result Value Ref Range    [facility]. Specimens collected/sent to [facility]. Please direct inquiries to ([phone]). Visit Vitals  /68   Pulse 86   Temp 98.1 °F (36.7 °C) (Oral)   Resp 16   Ht 5' 9\" (1.753 m)   Wt (!) 381 lb (172.8 kg)   SpO2 96%   BMI 56.26 kg/m²         Physical Exam   Constitutional: He is oriented to person, place, and time. He appears well-developed and well-nourished. No distress. HENT:   Nasal congestion is noted with difficulty breathing and. Orally there are no lesions in the ears look normal.   Neck: Neck supple. No thyromegaly present. Carotid bruit absent   Cardiovascular: Normal rate, regular rhythm and intact distal pulses. Exam reveals no gallop and no friction rub. No murmur heard. Pulmonary/Chest: Effort normal and breath sounds normal. No respiratory distress. Musculoskeletal: He exhibits tenderness (Right side of the neck aggravated by rotation of the neck to the right and right lateral extension). He exhibits no edema. Lymphadenopathy:     He has no cervical adenopathy. Neurological: He is alert and oriented to person, place, and time. Skin: Skin is warm and dry. Psychiatric: He has a normal mood and affect. His behavior is normal. Judgment and thought content normal.   Nursing note and vitals reviewed. MDM  Number of Diagnoses or Management Options  Essential hypertension with goal blood pressure less than 140/90:   Hyperlipidemia, unspecified hyperlipidemia type:    Morbid obesity ([address]):   Strain of neck muscle, initial encounter:   Diagnosis management comments: The patient has essential hypertension which is radiated control for him we will continue the present medications. The patient has significant obesity and his weight is increasing. We discussed diet and further exercise. A copy of a diet sheet was given. The patient had strain of the right neck and a muscle relaxant was administered short-term. ASSESSMENT and PLAN    ICD-10-CM ICD-9-CM    1. Strain of neck muscle, initial encounter S16. 1XXA 847.0 methocarbamol (ROBAXIN) 500 mg tablet    Robaxin ordered   2. Essential hypertension with goal blood pressure less than 140/90Chronic I10 401.9 lisinopril (PRINIVIL, ZESTRIL) 2.5 mg tablet      carvedilol (COREG) 6.25 mg tablet    Right at the border continue present medications   3. Hyperlipidemia, unspecified hyperlipidemia type E78.5 272.4 gemfibrozil (LOPID) 600 mg tablet    Continue present treatment recheck before next visit   4. Morbid obesity ([facility] E66.01 278.01     Mediterranean diet was suggested. Exercise suggested. Follow-up next visit consider other modalities. Follow-up and Dispositions    · Return in about 3 months (around 10/30/2019) for Follow up on [facility].

## 2019-07-30 ENCOUNTER — OFFICE VISIT (OUTPATIENT)
Dept: FAMILY MEDICINE CLINIC | Facility: CLINIC | Age: 24
End: 2019-07-30

## 2019-07-30 VITALS
RESPIRATION RATE: 16 BRPM | HEART RATE: 86 BPM | OXYGEN SATURATION: 96 % | DIASTOLIC BLOOD PRESSURE: 68 MMHG | HEIGHT: 69 IN | WEIGHT: 315 LBS | BODY MASS INDEX: 46.65 KG/M2 | TEMPERATURE: 98.1 F | SYSTOLIC BLOOD PRESSURE: 140 MMHG

## 2019-07-30 DIAGNOSIS — S16.1XXA STRAIN OF NECK MUSCLE, INITIAL ENCOUNTER: Primary | ICD-10-CM

## 2019-07-30 DIAGNOSIS — I10 ESSENTIAL HYPERTENSION WITH GOAL BLOOD PRESSURE LESS THAN 140/90: ICD-10-CM

## 2019-07-30 DIAGNOSIS — E66.01 MORBID OBESITY (HCC): ICD-10-CM

## 2019-07-30 DIAGNOSIS — E78.5 HYPERLIPIDEMIA, UNSPECIFIED HYPERLIPIDEMIA TYPE: ICD-10-CM

## 2019-07-30 RX ORDER — GEMFIBROZIL 600 MG/1
600 TABLET, FILM COATED ORAL 2 TIMES DAILY
Qty: 60 TAB | Refills: 5 | Status: SHIPPED | OUTPATIENT
Start: 2019-07-30 | End: 2020-03-02 | Stop reason: SDUPTHER

## 2019-07-30 RX ORDER — METHOCARBAMOL 500 MG/1
500 TABLET, FILM COATED ORAL 3 TIMES DAILY
Qty: 10 TAB | Refills: 0 | Status: SHIPPED | OUTPATIENT
Start: 2019-07-30 | End: 2019-09-13 | Stop reason: SDUPTHER

## 2019-07-30 RX ORDER — LISINOPRIL 2.5 MG/1
2.5 TABLET ORAL DAILY
Qty: 90 TAB | Refills: 1 | Status: SHIPPED | OUTPATIENT
Start: 2019-07-30 | End: 2019-10-29 | Stop reason: SDUPTHER

## 2019-07-30 RX ORDER — CARVEDILOL 6.25 MG/1
6.25 TABLET ORAL 2 TIMES DAILY WITH MEALS
Qty: 180 TAB | Refills: 1 | Status: SHIPPED | OUTPATIENT
Start: 2019-07-30 | End: 2019-10-29 | Stop reason: SDUPTHER

## 2019-07-30 NOTE — PATIENT INSTRUCTIONS
Come in a week before your next visit for lab draw  Hyperlipidemia: After Your Visit  Your Care Instructions  Hyperlipidemia is too much fat in your blood. The body has several kinds of fat, including cholesterol and triglycerides. Your body needs fat for many things, such as making new cells. But too much fat in your blood increases your chances of having a heart attack or stroke. You may be able to lower your cholesterol and triglycerides with a heart-healthy diet, exercise, and if needed, medicine. Your doctor may want you to try lifestyle changes first to see whether they lower the fat in your blood. You may need to take medicine if lifestyle changes do not lower the fat in your blood enough. Follow-up care is a key part of your treatment and safety. Be sure to make and go to all appointments, and call your doctor if you are having problems. Its also a good idea to know your test results and keep a list of the medicines you take. How can you care for yourself at home? Take your medicines  · Take your medicines exactly as prescribed. Call your doctor if you think you are having a problem with your medicine. · If you take medicine to lower your cholesterol, go to follow-up visits. You will need to have blood tests. · Do not take large doses of niacin, which is a B vitamin, while taking medicine called statins. It may increase the chance of muscle pain and liver problems. · Talk to your doctor about avoiding grapefruit juice if you are taking statins. Grapefruit juice can raise the level of this medicine in your blood. This could increase side effects. Eat more fruits, vegetables, and fiber  · Fruits and vegetables have lots of nutrients that help protect against heart disease, and they have littleif anyfat. Try to eat at least five servings a day. Dark green, deep orange, or yellow fruits and vegetables are healthy choices. · Keep carrots, celery, and other veggies handy for snacks.  Buy fruit that is in season and store it where you can see it so that you will be tempted to eat it. Cook dishes that have a lot of veggies in them, such as stir-fries and soups. · Foods high in fiber may reduce your cholesterol and provide important vitamins and minerals. High-fiber foods include whole-grain cereals and breads, oatmeal, beans, brown rice, citrus fruits, and apples. · Buy whole-grain breads and cereals instead of white bread and pastries. Limit saturated fat  · Read food labels and try to avoid saturated fat and trans fat. They increase your risk of heart disease. · Use olive or canola oil when you cook. Try cholesterol-lowering spreads, such as Benecol or Take Control. · Bake, broil, grill, or steam foods instead of frying them. · Limit the amount of high-fat meats you eat, including hot dogs and sausages. Cut out all visible fat when you prepare meat. · Eat fish, skinless poultry, and soy products such as tofu instead of high-fat meats. Soybeans may be especially good for your heart. Eat at least two servings of fish a week. Certain fish, such as salmon, contain omega-3 fatty acids, which may help reduce your risk of heart attack. · Choose low-fat or fat-free milk and dairy products. Get exercise, limit alcohol, and quit smoking  · Get more exercise. Work with your doctor to set up an exercise program. Even if you can do only a small amount, exercise will help you get stronger, have more energy, and manage your weight and your stress. Walking is an easy way to get exercise. Gradually increase the amount you walk every day. Aim for at least 30 minutes on most days of the week. You also may want to swim, bike, or do other activities. · Limit alcohol to no more than 2 drinks a day for men and 1 drink a day for women. · Do not smoke. If you need help quitting, talk to your doctor about stop-smoking programs and medicines. These can increase your chances of quitting for good.   When should you call for help?  Call 911 anytime you think you may need emergency care. For example, call if:  · You have symptoms of a heart attack. These may include:  ¨ Chest pain or pressure, or a strange feeling in the chest.  ¨ Sweating. ¨ Shortness of breath. ¨ Nausea or vomiting. ¨ Pain, pressure, or a strange feeling in the back, neck, jaw, or upper belly or in one or both shoulders or arms. ¨ Lightheadedness or sudden weakness. ¨ A fast or irregular heartbeat. After you call 911, the  may tell you to chew 1 adult-strength or 2 to 4 low-dose aspirin. Wait for an ambulance. Do not try to drive yourself. · You have signs of a stroke. These may include:  ¨ Sudden numbness, paralysis, or weakness in your face, arm, or leg, especially on only one side of your body. ¨ New problems with walking or balance. ¨ Sudden vision changes. ¨ Drooling or slurred speech. ¨ New problems speaking or understanding simple statements, or feeling confused. ¨ A sudden, severe headache that is different from past headaches. · You passed out (lost consciousness). Call your doctor now or seek immediate medical care if:  · You have muscle pain or weakness. Watch closely for changes in your health, and be sure to contact your doctor if:  · You are very tired. · You have an upset stomach, gas, constipation, or belly pain or cramps. Where can you learn more? Go to [facility]  Enter C406 in the search box to learn more about \"Hyperlipidemia: After Your Visit. \"   © 8235-4579 Healthwise, Incorporated. Care instructions adapted under license by [facility] [employer] [facility] (which disclaims liability or warranty for this information). This care instruction is for use with your licensed healthcare professional. If you have questions about a medical condition or this instruction, always ask your healthcare professional. [place] [address] any warranty or liability for your use of this information.   Content Version: 9. 5.734178; Last Revised: October 13, 2011                 Learning About the Mediterranean Diet  What is the Mediterranean diet? The Mediterranean diet is a style of eating rather than a diet plan. It features foods eaten in [place], [place], [place], and other countries along the [facility]. It emphasizes eating foods like fish, fruits, vegetables, beans, high-fiber breads and whole grains, nuts, and olive oil. This style of eating includes limited red meat, cheese, and sweets. Why choose the Mediterranean diet? A Mediterranean-style diet may improve heart health. It contains more fat than other heart-healthy diets. But the fats are mainly from nuts, unsaturated oils (such as fish oils and olive oil), and certain nut or seed oils (such as canola, soybean, or flaxseed oil). These fats may help protect the heart and blood vessels. How can you get started on the Mediterranean diet? Here are some things you can do to switch to a more Mediterranean way of eating. What to eat  · Eat a variety of fruits and vegetables each day, such as grapes, blueberries, tomatoes, broccoli, peppers, figs, olives, spinach, eggplant, beans, lentils, and chickpeas. · Eat a variety of whole-grain foods each day, such as oats, brown rice, and whole wheat bread, pasta, and couscous. · Eat fish at least 2 times a week. Try tuna, salmon, mackerel, lake trout, herring, or sardines. · Eat moderate amounts of low-fat dairy products, such as milk, cheese, or yogurt. · Eat moderate amounts of poultry and eggs. · Choose healthy (unsaturated) fats, such as nuts, olive oil, and certain nut or seed oils like canola, soybean, and flaxseed. · Limit unhealthy (saturated) fats, such as butter, palm oil, and coconut oil. And limit fats found in animal products, such as meat and dairy products made with whole milk. Try to eat red meat only a few times a month in very small amounts.   · Limit sweets and desserts to only a few times a week. This includes sugar-sweetened drinks like soda. The Mediterranean diet may also include red wine with your meal1 glass each day for women and up to 2 glasses a day for men. Tips for eating at home  · Use herbs, spices, garlic, lemon zest, and citrus juice instead of salt to add flavor to foods. · Add avocado slices to your sandwich instead of ortega. · Have fish for lunch or dinner instead of red meat. Brush the fish with olive oil, and broil or grill it. · Sprinkle your salad with seeds or nuts instead of cheese. · Cook with olive or canola oil instead of butter or oils that are high in saturated fat. · Switch from 2% milk or whole milk to 1% or fat-free milk. · Dip raw vegetables in a vinaigrette dressing or hummus instead of dips made from mayonnaise or sour cream.  · Have a piece of fruit for dessert instead of a piece of cake. Try baked apples, or have some dried fruit. Tips for eating out  · Try broiled, grilled, baked, or poached fish instead of having it fried or breaded. · Ask your  to have your meals prepared with olive oil instead of butter. · Order dishes made with marinara sauce or sauces made from olive oil. Avoid sauces made from cream or mayonnaise. · Choose whole-grain breads, whole wheat pasta and pizza crust, brown rice, beans, and lentils. · Cut back on butter or margarine on bread. Instead, you can dip your bread in a small amount of olive oil. · Ask for a side salad or grilled vegetables instead of french fries or chips. Where can you learn more? Go to http://leandra-giovanni.info/. Enter 867-011-0187 in the search box to learn more about \"Learning About the Mediterranean Diet. \"  Current as of: November 7, 2018  Content Version: 12.1  © 4177-5396 Healthwise, Incorporated. Care instructions adapted under license by [employer] (which disclaims liability or warranty for this information).  If you have questions about a medical condition or this instruction, always ask your healthcare professional. [place] [address] any warranty or liability for your use of this information.

## 2019-07-30 NOTE — PROGRESS NOTES
Chief Complaint   Patient presents with    Follow Up Chronic Condition     HTN Cholesterol Room 8    Headache    Medication Refill

## 2019-08-23 ENCOUNTER — OFFICE VISIT (OUTPATIENT)
Dept: CARDIOLOGY CLINIC | Age: 24
End: 2019-08-23

## 2019-08-23 VITALS
BODY MASS INDEX: 46.65 KG/M2 | SYSTOLIC BLOOD PRESSURE: 109 MMHG | HEIGHT: 69 IN | WEIGHT: 315 LBS | HEART RATE: 85 BPM | DIASTOLIC BLOOD PRESSURE: 67 MMHG

## 2019-08-23 DIAGNOSIS — I10 ESSENTIAL HYPERTENSION WITH GOAL BLOOD PRESSURE LESS THAN 140/90: Primary | ICD-10-CM

## 2019-08-23 DIAGNOSIS — I42.9 CARDIOMYOPATHY, UNSPECIFIED TYPE (HCC): ICD-10-CM

## 2019-08-23 DIAGNOSIS — E66.01 MORBID OBESITY (HCC): ICD-10-CM

## 2019-08-23 DIAGNOSIS — E78.5 HYPERLIPIDEMIA, UNSPECIFIED HYPERLIPIDEMIA TYPE: ICD-10-CM

## 2019-08-23 NOTE — PROGRESS NOTES
HISTORY OF PRESENT ILLNESS  Opal Santos is a 21 y.o. male. Cardiomyopathy   The history is provided by the patient. This is a chronic problem. The problem has been resolved. Associated symptoms include shortness of breath. Pertinent negatives include no chest pain, no abdominal pain and no headaches. Hypertension   The history is provided by the patient. This is a chronic problem. The problem has not changed since onset. Associated symptoms include shortness of breath. Pertinent negatives include no chest pain, no abdominal pain and no headaches. Shortness of Breath   The history is provided by the patient. This is a recurrent problem. The problem occurs intermittently. The problem has been rapidly improving. Pertinent negatives include no fever, no headaches, no cough, no sputum production, no hemoptysis, no wheezing, no PND, no orthopnea, no chest pain, no vomiting, no abdominal pain, no rash, no leg swelling and no claudication. Review of Systems   Constitutional: Negative for chills and fever. HENT: Negative for nosebleeds. Eyes: Negative for blurred vision and double vision. Respiratory: Positive for shortness of breath. Negative for cough, hemoptysis, sputum production and wheezing. Cardiovascular: Negative for chest pain, palpitations, orthopnea, claudication, leg swelling and PND. Gastrointestinal: Negative for abdominal pain, heartburn, nausea and vomiting. Musculoskeletal: Negative for myalgias. Skin: Negative for rash. Neurological: Negative for dizziness, weakness and headaches. Endo/Heme/Allergies: Does not bruise/bleed easily.      Family History   Problem Relation Age of Onset    Diabetes Mother     Hypertension Mother     Heart Attack Father 48    Hypertension Sister     Cancer Maternal Aunt     Stroke Maternal Grandmother     Stroke Maternal Grandfather     Heart Surgery Neg Hx        Past Medical History:   Diagnosis Date    Asthma 5/5/2014    possible asthma r/o cardiac etiology h/o chest trauma as a child     Hypertension     Other specified cardiac dysrhythmias(427.89) 5/5/2014    marked sinus bradycardia     Palpitations 5/5/2014    r/o arrythmia     Shortness of breath 5/5/2014    possible asthma r/o cardiac etiology h/o chest trauma as a child        Past Surgical History:   Procedure Laterality Date    HX ADENOIDECTOMY         Social History     Tobacco Use    Smoking status: Never Smoker    Smokeless tobacco: Never Used   Substance Use Topics    Alcohol use: No     Alcohol/week: 0.0 standard drinks       Allergies   Allergen Reactions    Penicillins Unable to Obtain           Visit Vitals  /67   Pulse 85   Ht 5' 9\" (1.753 m)   Wt (!) 172.8 kg (381 lb)   BMI 56.26 kg/m²         Physical Exam   Constitutional: He is oriented to person, place, and time. He appears well-developed and well-nourished. HENT:   Head: Normocephalic and atraumatic. Eyes: Conjunctivae are normal.   Neck: Neck supple. No JVD present. No tracheal deviation present. No thyromegaly present. Cardiovascular: Normal rate, regular rhythm and normal heart sounds. Exam reveals no gallop and no friction rub. No murmur heard. Pulmonary/Chest: Breath sounds normal. No respiratory distress. He has no wheezes. He has no rales. He exhibits no tenderness. Abdominal: Soft. There is no tenderness. Musculoskeletal: He exhibits no edema. Neurological: He is alert and oriented to person, place, and time. Skin: Skin is warm and dry. Psychiatric: He has a normal mood and affect. Mr. Petrona Steinberg has a reminder for a \"due or due soon\" health maintenance. I have asked that he contact his primary care provider for follow-up on this health maintenance. No flowsheet data found. I have personally reviewed patient's records available from hospital and other providers and incorporated findings in patient care.   SUMMARY:echo:12/2015  Left ventricle: Systolic function was normal. Ejection fraction was  estimated to be 55 %. There were no regional wall motion abnormalities. Left ventricular diastolic function parameters were normal.    COMPARISONS:  Comparison was made with the previous study of 02-Jun-2015. LV overall  function has increased from 45 % to 55 %  Interpretation Summary 8/2019       · Left Ventricle: Normal cavity size, systolic function (ejection fraction normal) and diastolic function. Moderate concentric hypertrophy. Estimated left ventricular ejection fraction is 56 - 60%. No regional wall motion abnormality noted. · Right Ventricle: Normal right ventricular size and function. · No hemodynamically significant valvular pathology. I Have personally reviewed recent relevant labs available and discussed with patient  6/2019-BMP and lipids    Assessment         ICD-10-CM ICD-9-CM    1. Essential hypertension with goal blood pressure less than 140/90 I10 401.9     Stable continue current medication   2. Cardiomyopathy, unspecified type ([address] I42.9 425.4     Improving ejection fraction last were normal at 55 to 60%   3. Morbid obesity ([address] E66.01 278.01     Continue diet and exercise. 4. Hyperlipidemia, unspecified hyperlipidemia type E78.5 272.4     Currently on gemfibrozil. , HDL 24 on recent labs. Continue diet and current medicine         No orders of the defined types were placed in this encounter. Follow-up and Dispositions    · Return in about 1 year (around 8/23/2020).

## 2019-08-23 NOTE — PROGRESS NOTES
1. Have you been to the ER, urgent care clinic since your last visit? Hospitalized since your last visit? No    2. Have you seen or consulted any other health care providers outside of the [address] [place] [address] since your last visit? Include any pap smears or colon screening.  No

## 2019-09-27 ENCOUNTER — OFFICE VISIT (OUTPATIENT)
Dept: ORTHOPEDIC SURGERY | Facility: CLINIC | Age: 24
End: 2019-09-27

## 2019-09-27 VITALS
SYSTOLIC BLOOD PRESSURE: 134 MMHG | HEART RATE: 88 BPM | OXYGEN SATURATION: 98 % | TEMPERATURE: 96.7 F | DIASTOLIC BLOOD PRESSURE: 67 MMHG | WEIGHT: 315 LBS | RESPIRATION RATE: 22 BRPM | HEIGHT: 69 IN | BODY MASS INDEX: 46.65 KG/M2

## 2019-09-27 DIAGNOSIS — M25.512 BILATERAL SHOULDER PAIN, UNSPECIFIED CHRONICITY: ICD-10-CM

## 2019-09-27 DIAGNOSIS — M75.51 BURSITIS OF BOTH SHOULDERS: ICD-10-CM

## 2019-09-27 DIAGNOSIS — R52 PAIN: Primary | ICD-10-CM

## 2019-09-27 DIAGNOSIS — M25.511 BILATERAL SHOULDER PAIN, UNSPECIFIED CHRONICITY: ICD-10-CM

## 2019-09-27 DIAGNOSIS — M75.52 BURSITIS OF BOTH SHOULDERS: ICD-10-CM

## 2019-09-27 RX ORDER — TRIAMCINOLONE ACETONIDE 40 MG/ML
40 INJECTION, SUSPENSION INTRA-ARTICULAR; INTRAMUSCULAR ONCE
Qty: 1 ML | Refills: 0
Start: 2019-09-27 | End: 2019-09-27

## 2019-09-27 NOTE — PROGRESS NOTES
HISTORY OF PRESENT ILLNESS:  Bentley Basilio is a 25-year-old, severely morbidly obese,  male who comes in to the office today with his mother complaining of bilateral shoulder pain. He was treated with cortisone injections last November 2018 to his right shoulder that successfully helped his pain. He complains of pain with motion left greater than right in particular in the forward flexion and behind the behind the back plane. REVIEW OF SYSTEMS:   No chest pain, no shortness of breath, other than heavy exertional type. No fever, chills, or night sweats. No rash and no itching. No nausea or vomiting. Pain to the shoulder with motion forward and to the side, as well as putting his hand behind his back to reach his back pocket. At rest pain Salvador shoulder 4-5/10 and with afore mentioned activities pain 8-9/10. He has no history of trauma to the bilateral shoulders. PHYSICAL EXAM:  He is a severely, morbidly obese,  male, atraumatic, normocephalic, alert and oriented times three sitting on the table comfortably. He is joined by his mother today. Examination of the bilateral shoulders reveals no fracture deformity, lesions, masses, or step-offs. He does have pain palpable at the distal [facility] joint. His forward flexion actively is 110°, which he can hold against resistance. His glenohumeral abduction actively is 90°. He can also hold against resistance. He has pain through both planes of motion beginning at about 80° and through to end point. His passive external rotation is 30° with moderate pain reproduced to the shoulder. His internal rotation is poor today with the patient guarding and providing little effort and limited to the posterior aspect of the right iliac crest.  He does have a positive empty can sign greater on the left side than right. RADIOGRAPHS:  X-ray of the bilateral shoulder today reveals early glenohumeral osteoarthritis and early AC joint arthritis.   There is no fracture deformities or other osseous lesions noted bilaterally. IMPRESSION:      1. Right shoulder strain. 2. Right shoulder recurrent bursitis. 3. Decreased range of motion of the right shoulder secondary to above. 4. Morbid obesity BMI 56.12, weight 380 lbs, height 5'9\"  5. Right shoulder strain. 6. Right shoulder recurrent bursitis. 7.   Decreased range of motion of the right shoulder secondary to above. Medical Decision Making with Comprehensive Data Review:    The patients presenting problems have been discussed, and they/their family are in agreement with the care plan formulated and outlined with them. Treatment option(s) discussed to include, but cannot be limited to Physical / Occupational outpatient therapy, arthrocentesis, elective and or urgent surgical intervention of aforementioned patient medical condition. I have encouraged the patient / family to ask questions as they arise throughout their visit. The patient elected after all options discussed to treat the   Chief Complaint   Patient presents with    Shoulder Pain     [place]    with cortisone injections and follow on physical therapy. PROCEDURE:  Today, using sterile technique, after verbal and written consent were obtained and appropriate time out performed, 1 of Kenalog at 40 mg per mL mixed with 7 mL of Sensorcaine 0.75% was injected in Bilateral shoulder using the posterior subacromial approach. There were no complications. The patient tolerated the procedure well. PLAN:   I am currently recommending a low-dose cortisone injection for his symptoms associated with his bilateral shoulder recurrent bursitis and strain, but I would also like to place him in physical therapy for strengthening and range of motion involving the shoulder two times weekly times three weeks. We are going to plan on seeing the patient back on a prn basis. Today, all of his and his mothers questions were answered to their satisfaction.   A copy of their x-rays was reviewed and provided.

## 2019-10-10 ENCOUNTER — HOSPITAL ENCOUNTER (OUTPATIENT)
Dept: PHYSICAL THERAPY | Age: 24
Discharge: HOME | End: 2019-10-10
Payer: MEDICAID

## 2019-10-10 PROCEDURE — 97161 PT EVAL LOW COMPLEX 20 MIN: CPT

## 2019-10-10 NOTE — PROGRESS NOTES
[facility] [place] [facility]  [address] [place] [address]  [phone] fax    Plan of Care/ Statement of Necessity for Physical Therapy Services    Patient name: Jim Pina Start of Care: 10/10/2019   Referral source: Donovan Mehta : 1995    Medical Diagnosis: Pain in right shoulder [M25.511]  Pain in left shoulder [M25.512]  Bursitis of right shoulder [M75.51]  Bursitis of left shoulder [M75.52]  Payor: [facility] MEDICAID / Plan: [facility] COMMUNITY PLAN [facility] / Product Type: Managed Care Medicaid /  Onset Date: about 4 weeks ago    Treatment Diagnosis: B shoulders pain, left>Right   Prior Hospitalization: see medical history Provider#: [id number]   Medications: Verified on Patient summary List    Comorbidities: HTN, asthma   Prior Level of Function: ind with all mobility, Right handed, 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 The Plan of Care and following information is based on the information from the initial evaluation. Assessment/ knight information: Mr. Carlos Field is a 20 y/o, M pt with CC of Left shoulder pain. Pt reports repetitive lifting/moving heavy about 3-4 weeks ago. He's been having pain with both sides but Right shoulder improved at this time. Pain worst at Left clavicle, and SC joint. Imaging done including X-ray with no significant findings except sweeling. Pt present with WNL AROM but poor mechanics with lifting, demonstrates early and over upward rotation of Left scap, significant UT compensation at last 25 degs of flex and abd. PTT along Left SC, clavicle and AC joint. Pt demonstrates fairly good strength overall except 3/5 with LT. Posture characterized with min forwarded head, rounded shoulders, worst with Left side.  Pt would benefit from skilled PT to address these deficits above to improve the ability to return to PLOF comfortably and safely.     Evaluation Complexity History MEDIUM  Complexity : 1-2 comorbidities / personal factors will impact the outcome/ POC ; Examination MEDIUM Complexity : 3 Standardized tests and measures addressing body structure, function, activity limitation and / or participation in recreation  ;Presentation LOW Complexity : Stable, uncomplicated  ;Clinical Decision Making MEDIUM Complexity : FOTO score of 26-74  Overall Complexity Rating: LOW   Problem List: pain affecting function, decrease ROM, decrease strength, edema affecting function, decrease ADL/ functional abilitiies, decrease activity tolerance and decrease flexibility/ joint mobility   Treatment Plan may include any combination of the following: Therapeutic exercise, Therapeutic activities, Neuromuscular re-education, Physical agent/modality, Manual therapy, Patient education, Self Care training, Functional mobility training and Home safety training  Patient / Family readiness to learn indicated by: asking questions, trying to perform skills and interest  Persons(s) to be included in education: patient (P)  Barriers to Learning/Limitations: None  Patient Goal (s): to get my shoulder better  Patient Self Reported Health Status: poor  Rehabilitation Potential: good  Short term goals: To be accomplished within 1 week    1. Pt will be independent with HEP to maintain progression.     Long term goals: To be accomplished within 4 weeks  1. Pt will improve FOTO score by 15 points to 73/100 to show improvement with functional mobility performance. 2. Pt will report improved pain to 0-2/10 so he can perform ADLs with ease. 3. Pt will have B LT strength improved to 4/5 at least to be able to perform ADLs comfortably.        Frequency / Duration: Patient to be seen 2-3 times per week for 4 weeks.     Patient/ CarPatient/ Caregiver education and instruction: Diagnosis, prognosis, self care, activity modification, brace/ splint application and exercises   [x]  Plan of care has been reviewed with SARANYA Garcia PT 10/10/2019 3:50 PM    ________________________________________________________________________    I certify that the above Therapy Services are being furnished while the patient is under my care. I agree with the treatment plan and certify that this therapy is necessary.     Physician's Signature:____________Date:_________TIME:________    ** Signature, Date and Time must be completed for valid certification **    Please sign and return to [facility]  [address] [place] [address]  [phone] fax

## 2019-10-10 NOTE — PROGRESS NOTES
PT DAILY TREATMENT NOTE/SHOULDER EVAL 10-18    Patient Name: Jie Clark  Date:10/10/2019  : 1995  [x]  Patient  Verified  Payor: Adithya Marc / Plan: [facility] COMMUNITY PLAN [employer] / Product Type: Managed Care Medicaid /    In time: 3:03  Out time: 3:38  Total Treatment Time (min): 35  Visit #: 1 of     Medicare/BCBS Only   Total Timed Codes (min):  5 1:1 Treatment Time:  35       Treatment Area: Pain in right shoulder [M25.511]  Pain in left shoulder [M25.512]  Bursitis of right shoulder [M75.51]  Bursitis of left shoulder [M75.52]    SUBJECTIVE  Pain Level (0-10 scale): 1/10  []constant []intermittent []improving []worsening []no change since onset    Any medication changes, allergies to medications, adverse drug reactions, diagnosis change, or new procedure performed?: [x] No    [] Yes (see summary sheet for update)  Subjective functional status/changes:     PLOF: Right handed, 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 Limitations to PLOF:   Mechanism of Injury:   Current symptoms/Complaints: Mr. Zenobia Denny is a 20 y/o, M pt with CC of Left shoulder pain. Pt reports repetitive lifting/moving heavy about 3-4 weeks ago. He's been having pain with both sides but Right shoulder improved at this time. Pain worst at Left clavicle, and SC joint. Imaging done including X-ray with no significant findings except sweeling.    Previous Treatment/Compliance:   PMHx/Surgical Hx:   Work Hx:   Living Situation:   Pt Goals: \"to get my shoulder better\"  Barriers: []pain []financial []time []transportation []other  Motivation:   Substance use: []Alcohol []Tobacco []other:   FABQ Score: []low []elevate  Cognition: A & O x     Other:    OBJECTIVE/EXAMINATION  Domestic Life:   Activity/Recreational Limitations:   Mobility:   Self Care:       30 min [x]Eval                  []Re-Eval       5 min Therapeutic Activity:  []  See flow sheet :Pt edu within scope of practice on prognosis, POC, shoulder anatomy, modalities use, posture/positioning, HEP. Rationale: increase ROM, increase strength, improve coordination and increase proprioception  to improve the patients ability to perform ADLs with ease and safety             With   [] TE   [] TA   [] neuro   [] other: Patient Education: [x] Review HEP    [] Progressed/Changed HEP based on:   [] positioning   [] body mechanics   [] transfers   [] heat/ice application    [] other:      Other Objective/Functional Measures:     Physical Therapy Evaluation - Shoulder    Posture: [x] Poor    [] Fair    [] Good    Describe: forwarded head and rounded shoulders, worst with Right    ROM:  [] Unable to assess at this time WNL with AROM B                                           AROM                                                              PROM   Left Right  Left Right   Flexion   Flexion     Extension   Extension     Scaption/ABD   Scaptin/ABD     ER @ 0 Degrees   ER @ 0 Degrees     ER @ 90 Degrees   ER @ 90 Degrees     IR @ 90 Degrees   IR @ 90 Degrees       End Feel / Painful Arc:    Strength:   [] Unable to assess at this time                                                                            L (1-5) R (1-5) Pain   Flexors 5 5 [] Yes   [] No   Abductors 4+, P 5 [] Yes   [] No   External Rotators ~4+ ~4+ [] Yes   [] No   Internal Rotators 5 5 [] Yes   [] No   Supraspinatus 4 4 [] Yes   [] No   Serratus Anterior 4+ 4+ [] Yes   [] No   Lower Trapezius 4- 3 [] Yes   [] No   Elbow Flexion 5 5 [] Yes   [] No   Elbow Extension 5 5 [] Yes   [] No       Scapulohumoral Control / Rhythm:  Able to eccentrically lower with good control?  Left: [x] Yes   [] No     Right: [x] Yes   [] No    Accessory Motions:    Palpation  [] Min  [x] Mod  [] Severe    Location: Left SC, AC and clavicale  [] Min  [] Mod  [] Severe    Location:  [] Min  [] Mod  [] Severe    Location:    Optional Tests:    Sensation Left Right Reflexes Left Right   Biceps (C5)   Biceps (C5)     Wheat Radial(C6-7)   Brachioradialis (C6)     Wheat Ulnar(C8-T1)   Triceps (C7)       Adson's Test  [] Pos   [] Neg Yergason's Test [] Pos   [x] Neg  Louann's Test  [] Pos   [] Neg [place]'s Sign [] Pos   [] Neg  Neer's Test  [] Pos   [x] Neg Clunk Test  [] Pos   [] Neg  Hawkin's Test  [] Pos   [] Neg AC Joint  [x] Pos   [] Neg  Speed's Test  [] Pos   [] Neg SC Joint  [x] Pos   [] Neg  Empty Can  [] Pos   [x] Neg Pectoral Tightness [x] Pos   [] Neg  Anterior Apprehension [] Pos   [] Neg   Posterior Apprehension [] Pos   [] Neg      Other Tests / Comments:    BP: 109/83 mmHg; HR: 89 bpm      Pain Level (0-10 scale) post treatment: 1/10    ASSESSMENT/Changes in Function: see POC    Patient will continue to benefit from skilled PT services to modify and progress therapeutic interventions, address functional mobility deficits, address ROM deficits, address strength deficits, analyze and address soft tissue restrictions, analyze and cue movement patterns, analyze and modify body mechanics/ergonomics, assess and modify postural abnormalities and instruct in home and community integration to attain remaining goals.      [x]  See Plan of Care  []  See progress note/recertification  []  See Discharge Summary         Progress towards goals / Updated goals:  See POC    PLAN  [x]  Upgrade activities as tolerated     [x]  Continue plan of care  []  Update interventions per flow sheet       []  Discharge due to:_  []  Other:_      Analy Mckeon, PT 10/10/2019  2:49 PM

## 2019-10-17 ENCOUNTER — HOSPITAL ENCOUNTER (OUTPATIENT)
Dept: PHYSICAL THERAPY | Age: 24
Discharge: HOME | End: 2019-10-17
Payer: MEDICAID

## 2019-10-17 PROCEDURE — 97140 MANUAL THERAPY 1/> REGIONS: CPT

## 2019-10-17 PROCEDURE — 97110 THERAPEUTIC EXERCISES: CPT

## 2019-10-17 NOTE — PROGRESS NOTES
PT DAILY TREATMENT NOTE 10-18    Patient Name: Ned Rm  Date:10/17/2019  : 1995  [x]  Patient  Verified  Payor: [facility] MEDICAID / Plan: [facility] COMMUNITY PLAN [employer] / Product Type: Managed Care Medicaid /    In time4:00  Out time:4:41  Total Treatment Time (min): 41  Visit #: 2 of     Treatment Area: Pain in right shoulder [M25.511]  Pain in left shoulder [M25.512]  Bursitis of right shoulder [M75.51]  Bursitis of left shoulder [M75.52]    SUBJECTIVE  Pain Level (0-10 scale): 1/10  Any medication changes, allergies to medications, adverse drug reactions, diagnosis change, or new procedure performed?: [x] No    [] Yes (see summary sheet for update)  Subjective functional status/changes:   [] No changes reported  Pt reports he does a lot of karen and knows he has bad posture. He carried a window AC unit to the trash for his mom and felt soreness in the front of his left shoulder.      OBJECTIVE    33 min Therapeutic Exercise:  [x] See flow sheet :   Rationale: increase ROM, increase strength, improve coordination, improve balance and increase proprioception to improve the patients ability to performing lifting without increased pain    8 min Manual Therapy:  Pectoral TPR and tennis ball massage   Rationale: decrease pain, increase ROM and increase tissue extensibility to improve ease of OH reaching without compensation          With   [] TE   [] TA   [] neuro   [] other: Patient Education: [x] Review HEP    [] Progressed/Changed HEP based on:   [] positioning   [] body mechanics   [] transfers   [] heat/ice application    [] other:      Other Objective/Functional Measures:   Educated on pectoral TPR and doorway stretch for HEP  Forward shoulder posture; educated on rest breaks every hour from karen to stretch  Decreased pain with shoulder elevation with correction of posture  Educated on icing anterior shoulder to decrease pain  Decreased posterior kinetic chain strength    Pain Level (0-10 scale) post treatment: 0/10    ASSESSMENT/Changes in Function: Initiated exercise program per POC. Pt with forward shoulder posture and decreased posterior kinetic chain strength. He spends a lot of time karen which contributes to poor posture awareness. Will continue with general strengthening to improve posture and shoulder mechanics for ease of lifting with decreased pain. Short term goals: To be accomplished within 1 week   1. Pt will be independent with HEP to maintain progression. met  Long term goals: To be accomplished within 4 weeks  1. Pt will improve FOTO score by 15 points to 73/100 to show improvement with functional mobility performance. 2. Pt will report improved pain to 0-2/10 so he can perform ADLs with ease.   3. Pt will have B LT strength improved to 4/5 at least to be able to perform ADLs comfortably.        PLAN  [x]  Upgrade activities as tolerated     [x]  Continue plan of care  []  Update interventions per flow sheet       []  Discharge due to:_  []  Other:_      Lai Bennett PTA 10/17/2019  2:52 PM    Future Appointments   Date Time Provider Edita Veras   10/17/2019  4:00 PM Jonas Gresham PTA [facility]   10/21/2019  3:30 PM Jonas Gresham PTA [facility]   10/23/2019 12:30 PM Jonas Gresham PTA [facility]   10/28/2019  3:00 PM Jonas Gresham PTA [facility]   10/29/2019  8:15 AM MD ALYSHA Clements-CARLA CONTRERAS   10/30/2019  3:00 PM Jonas Gresham PTA [facility]   11/5/2019  3:00 PM Jonas Gresham PTA [facility]   11/7/2019 12:30 PM Jaja VAZQUEZ [facility]   11/11/2019 12:00 PM Becka Richmond, PT [facility]   11/13/2019 12:00 PM Becka Richmond, PT [facility]   8/21/2020  9:00 AM Sabrina Zapien MD [address] [place] [address]

## 2019-10-21 ENCOUNTER — HOSPITAL ENCOUNTER (OUTPATIENT)
Dept: PHYSICAL THERAPY | Age: 24
Discharge: HOME | End: 2019-10-21
Payer: MEDICAID

## 2019-10-21 PROCEDURE — 97110 THERAPEUTIC EXERCISES: CPT

## 2019-10-21 NOTE — PROGRESS NOTES
PT DAILY TREATMENT NOTE 10-18    Patient Name: Lacie Reyna  Date:10/21/2019  : 1995  [x]  Patient  Verified  Payor: Adelfo Byrd / Plan: [facility] COMMUNITY PLAN [employer] / Product Type: Managed Care Medicaid /    In time: 3:30 Out time: 4:05  Total Treatment Time (min): 35  Visit #: 3 of     Treatment Area: Pain in right shoulder [M25.511]  Pain in left shoulder [M25.512]  Bursitis of right shoulder [M75.51]  Bursitis of left shoulder [M75.52]    SUBJECTIVE  Pain Level (0-10 scale): 2/10  Any medication changes, allergies to medications, adverse drug reactions, diagnosis change, or new procedure performed?: [x] No    [] Yes (see summary sheet for update)  Subjective functional status/changes:   [] No changes reported  Pt reports he has been trying to take rest breaks playing video games and stretch. He has been busy but wants to get stronger and improve his posture. OBJECTIVE    35 min Therapeutic Exercise:  [x] See flow sheet :   Rationale: increase ROM, increase strength, improve coordination, improve balance and increase proprioception to improve the patients ability to performing lifting without increased pain            With   [] TE   [] TA   [] neuro   [] other: Patient Education: [x] Review HEP    [] Progressed/Changed HEP based on:   [] positioning   [] body mechanics   [] transfers   [] heat/ice application    [] other:      Other Objective/Functional Measures:   Increased t/s kyphosis; added open book to address  Challenged with posterior shoulder strengthening and scapula depression  Focused on posture and pectoral stretching  No increased pain during session      Pain Level (0-10 scale) post treatment: 0/10    ASSESSMENT/Changes in Function:  Pt continues with increased forward head and shoulder posture with tight pectorals and t/s kyphosis from karen posture. Will work on posterior kinetic chain strengthening to improve ease of performing ADLs with decreased pain.      Short term goals: To be accomplished within 1 week   1. Pt will be independent with HEP to maintain progression. met  Long term goals: To be accomplished within 4 weeks  1. Pt will improve FOTO score by 15 points to 73/100 to show improvement with functional mobility performance. 2. Pt will report improved pain to 0-2/10 so he can perform ADLs with ease.   3. Pt will have B LT strength improved to 4/5 at least to be able to perform ADLs comfortably.        PLAN  [x]  Upgrade activities as tolerated     [x]  Continue plan of care  []  Update interventions per flow sheet       []  Discharge due to:_  []  Other:_      Max Och, PTA 10/21/2019  2:52 PM    Future Appointments   Date Time Provider Edita Veras   10/21/2019  3:30 PM Rosary Rom, PTA [facility]   10/23/2019 12:30 PM Rosary Rom, PTA [facility]   10/28/2019  3:00 PM Rosary Rom, PTA [facility]   10/29/2019  8:15 AM Emma Muñoz MD [facility]   10/30/2019  3:00 PM Rosary Rom, PTA [facility]   11/5/2019  3:00 PM Tempie Natalia, PTA [facility]   11/7/2019 12:30 PM Glenda Myers [id number]   11/11/2019 12:00 PM Ronald Hook, PT [id number] [facility]   11/13/2019 12:00 PM Ronald Hook, PT [facility]   8/21/2020  9:00 AM Koki Silva MD [address] [place] [address]

## 2019-10-23 ENCOUNTER — HOSPITAL ENCOUNTER (OUTPATIENT)
Dept: PHYSICAL THERAPY | Age: 24
Discharge: HOME | End: 2019-10-23
Payer: MEDICAID

## 2019-10-23 PROCEDURE — 97110 THERAPEUTIC EXERCISES: CPT

## 2019-10-23 NOTE — PROGRESS NOTES
PT DAILY TREATMENT NOTE 10-18    Patient Name: Jim Pina  Date:10/23/2019  : 1995  [x]  Patient  Verified  Payor: Dimitry Boucher / Plan: [facility] COMMUNITY PLAN [facility] / Product Type: Managed Care Medicaid /    In time: 11:30  Out time: 12:10  Total Treatment Time (min): 40  Visit #: 4 of -    Treatment Area: Pain in right shoulder [M25.511]  Pain in left shoulder [M25.512]  Bursitis of right shoulder [M75.51]  Bursitis of left shoulder [M75.52]    SUBJECTIVE  Pain Level (0-10 scale):  6/10  Any medication changes, allergies to medications, adverse drug reactions, diagnosis change, or new procedure performed?: [x] No    [] Yes (see summary sheet for update)  Subjective functional status/changes:   [] No changes reported  Pt. Reports he is having more pain today. He reports having increased pain in front of left shoulder today. OBJECTIVE    40 min Therapeutic Exercise:  [x] See flow sheet :   Rationale: increase ROM and increase strength to improve the patients ability to increase ease of reaching          With   [x] TE   [] TA   [] neuro   [] other: Patient Education: [x] Review HEP    [] Progressed/Changed HEP based on:   [] positioning   [] body mechanics   [] transfers   [] heat/ice application    [] other:      Other Objective/Functional Measures:   Pt. Has poor form with ER with 4# so switched to 2#   He required cues to perform prone T and W with correct form  Pt. Tolerated PT well with no reports of increased pain    Pain Level (0-10 scale) post treatment:  3/10    ASSESSMENT/Changes in Function: pt. Is progressing slowly towards goals. He had increased pain today but it decreases with exercises. He continues to have decreased posterior shoulder strength and decreased ER strength.      Patient will continue to benefit from skilled PT services to modify and progress therapeutic interventions, address functional mobility deficits, address ROM deficits, address strength deficits, analyze and address soft tissue restrictions and analyze and cue movement patterns to attain remaining goals. Progress towards goals / Updated goals:  Short term goals: To be accomplished within 1 week   1. Pt will be independent with HEP to maintain progression. met    Long term goals: To be accomplished within 4 weeks  1. Pt will improve FOTO score by 15 points to 73/100 to show improvement with functional mobility performance. 2. Pt will report improved pain to 0-2/10 so he can perform ADLs with ease.   Not met: 6/10 (10/23/19)  3. Pt will have B LT strength improved to 4/5 at least to be able to perform ADLs comfortably.        PLAN  []  Upgrade activities as tolerated     [x]  Continue plan of care  []  Update interventions per flow sheet       []  Discharge due to:_  []  Other:_      Mae Lucas, PT 10/23/2019  11:34 AM    Future Appointments   Date Time Provider Edita Valdovinosi   10/28/2019  3:00 PM Kristy London PTA [facility]   10/29/2019  8:15 AM Cindy Underwood MD [facility]   10/30/2019  3:00 PM Kristy London PTA [facility]   11/5/2019  3:00 PM Vasu Roth PTA [facility]   11/7/2019 12:30 PM Scot Dai [id number] [facility]   11/11/2019 12:00 PM Josseline Leroy PT [id number] [facility]   11/13/2019 12:00 PM Josseline Leroy PT [facility]   8/21/2020  9:00 AM Bertin Marinelli MD [address] [place] [address]

## 2019-10-28 ENCOUNTER — HOSPITAL ENCOUNTER (OUTPATIENT)
Dept: PHYSICAL THERAPY | Age: 24
Discharge: HOME | End: 2019-10-28
Payer: MEDICAID

## 2019-10-28 PROCEDURE — 97110 THERAPEUTIC EXERCISES: CPT

## 2019-10-28 NOTE — PROGRESS NOTES
PT DAILY TREATMENT NOTE 10-18    Patient Name: Latoya Villarreal  Date:10/28/2019  : 1995  [x]  Patient  Verified  Payor: [facility] MEDICAID / Plan: [facility] [employer] [place] [employer] / Product Type: Managed Care Medicaid /    In time: 3:00  Out time: 3:30  Total Treatment Time (min): 30  Visit #: 5 of     Treatment Area: Pain in right shoulder [M25.511]  Pain in left shoulder [M25.512]  Bursitis of right shoulder [M75.51]  Bursitis of left shoulder [M75.52]    SUBJECTIVE  Pain Level (0-10 scale):  1/10  Any medication changes, allergies to medications, adverse drug reactions, diagnosis change, or new procedure performed?: [x] No    [] Yes (see summary sheet for update)  Subjective functional status/changes:   [] No changes reported  Pt reports not as much pain in his shoulders today. When he has been getting pain it is in the front on his left more than the right near his neck/collarbone. OBJECTIVE    30 min Therapeutic Exercise:  [x] See flow sheet :   Rationale: increase ROM and increase strength to improve the patients ability to increase ease of reaching overhead and improving posture          With   [x] TE   [] TA   [] neuro   [] other: Patient Education: [x] Review HEP    [] Progressed/Changed HEP based on:   [] positioning   [] body mechanics   [] transfers   [] heat/ice application    [] other:      Other Objective/Functional Measures:   Continues with forward shoulder posture  Increased t/s kyphosis  Decreased posterior kinetic chain strength  Educated on continued rest breaks from karen or looking down posture  Challenged with scapular depression  Added S/L ER for posterior RTC strengthening      Pain Level (0-10 scale) post treatment:  1/10    ASSESSMENT/Changes in Function: Pt is progressing slowly with continued fluctuating pain levels. He has increased forward shoulder posture with increased t/s kyphosis due to prolonged karen with slouched posture.  Will continue working on posterior kinetic chain strengthening for reduced pain in the shoulders with lifting and OH activities. Progress towards goals / Updated goals:  Short term goals: To be accomplished within 1 week   1. Pt will be independent with HEP to maintain progression. met  Long term goals: To be accomplished within 4 weeks  1. Pt will improve FOTO score by 15 points to 73/100 to show improvement with functional mobility performance. 2. Pt will report improved pain to 0-2/10 so he can perform ADLs with ease.   Not met: 6/10 (10/23/19)  3. Pt will have B LT strength improved to 4/5 at least to be able to perform ADLs comfortably.        PLAN  [x]  Upgrade activities as tolerated     [x]  Continue plan of care  []  Update interventions per flow sheet       []  Discharge due to:_  []  Other:_      Wendy Drummond PTA 10/28/2019  11:34 AM    Future Appointments   Date Time Provider Edita Eda   10/28/2019  3:00 PM Alexandre Burnett [facility]CPTPB [facility]   10/29/2019  8:15 AM Abby Underwood MD [facility]   10/30/2019  3:00 PM Yaritza Clemente PTA [facility]   11/5/2019  3:00 PM David Almendarez PTA [facility]   11/7/2019 12:30 PM Zeke Walters CELFLXG [facility]   11/11/2019 12:00 PM Royce Bell, PT IYKLEED [facility]   11/13/2019 12:00 PM Royce Bell, PT [facility]CPTP[facility]   8/21/2020  9:00 AM Christy Crawford MD [address] [place] [address]

## 2019-10-29 ENCOUNTER — OFFICE VISIT (OUTPATIENT)
Dept: FAMILY MEDICINE CLINIC | Facility: CLINIC | Age: 24
End: 2019-10-29

## 2019-10-29 VITALS
DIASTOLIC BLOOD PRESSURE: 58 MMHG | TEMPERATURE: 97.4 F | SYSTOLIC BLOOD PRESSURE: 130 MMHG | BODY MASS INDEX: 46.65 KG/M2 | OXYGEN SATURATION: 95 % | HEIGHT: 69 IN | HEART RATE: 75 BPM | WEIGHT: 315 LBS | RESPIRATION RATE: 18 BRPM

## 2019-10-29 DIAGNOSIS — E66.01 MORBID OBESITY (HCC): ICD-10-CM

## 2019-10-29 DIAGNOSIS — Z23 ENCOUNTER FOR IMMUNIZATION: Primary | ICD-10-CM

## 2019-10-29 DIAGNOSIS — I10 ESSENTIAL HYPERTENSION WITH GOAL BLOOD PRESSURE LESS THAN 140/90: ICD-10-CM

## 2019-10-29 RX ORDER — CARVEDILOL 6.25 MG/1
6.25 TABLET ORAL 2 TIMES DAILY WITH MEALS
Qty: 180 TAB | Refills: 6 | Status: SHIPPED | OUTPATIENT
Start: 2019-10-29 | End: 2020-11-04

## 2019-10-29 RX ORDER — LISINOPRIL 2.5 MG/1
2.5 TABLET ORAL DAILY
Qty: 90 TAB | Refills: 6 | Status: SHIPPED | OUTPATIENT
Start: 2019-10-29 | End: 2020-01-31

## 2019-10-29 NOTE — PATIENT INSTRUCTIONS
Vaccine Information Statement    Influenza (Flu) Vaccine (Inactivated or Recombinant): What You Need to Know    Many Vaccine Information Statements are available in Malay and other languages. See www.immunize.org/vis  Hojas de información sobre vacunas están disponibles en español y en muchos otros idiomas. Visite www.immunize.org/vis    1. Why get vaccinated? Influenza vaccine can prevent influenza (flu). Flu is a contagious disease that spreads around the [facility] every year, usually between October and May. Anyone can get the flu, but it is more dangerous for some people. Infants and young children, people 72years of age and older, pregnant women, and people with certain health conditions or a weakened immune system are at greatest risk of flu complications. Pneumonia, bronchitis, sinus infections and ear infections are examples of flu-related complications. If you have a medical condition, such as heart disease, cancer or diabetes, flu can make it worse. Flu can cause fever and chills, sore throat, muscle aches, fatigue, cough, headache, and runny or stuffy nose. Some people may have vomiting and diarrhea, though this is more common in children than adults. Each year thousands of people in the [facility] die from flu, and many more are hospitalized. Flu vaccine prevents millions of illnesses and flu-related visits to the doctor each year. 2. Influenza vaccines     CDC recommends everyone 10months of age and older get vaccinated every flu season. Children 6 months through 6years of age may need 2 doses during a single flu season. Everyone else needs only 1 dose each flu season. It takes about 2 weeks for protection to develop after vaccination. There are many flu viruses, and they are always changing. Each year a new flu vaccine is made to protect against three or four viruses that are likely to cause disease in the upcoming flu season.  Even when the vaccine doesnt exactly match these viruses, it may still provide some protection. Influenza vaccine does not cause flu. Influenza vaccine may be given at the same time as other vaccines. 3. Talk with your health care provider    Tell your vaccine provider if the person getting the vaccine:   Has had an allergic reaction after a previous dose of influenza vaccine, or has any severe, life-threatening allergies.  Has ever had Guillain-Barré Syndrome (also called GBS). In some cases, your health care provider may decide to postpone influenza vaccination to a future visit. People with minor illnesses, such as a cold, may be vaccinated. People who are moderately or severely ill should usually wait until they recover before getting influenza vaccine. Your health care provider can give you more information. 4. Risks of a reaction     Soreness, redness, and swelling where shot is given, fever, muscle aches, and headache can happen after influenza vaccine.  There may be a very small increased risk of Guillain-Barré Syndrome (GBS) after inactivated influenza vaccine (the flu shot). [facility] children who get the flu shot along with pneumococcal vaccine (PCV13), and/or DTaP vaccine at the same time might be slightly more likely to have a seizure caused by fever. Tell your health care provider if a child who is getting flu vaccine has ever had a seizure. People sometimes faint after medical procedures, including vaccination. Tell your provider if you feel dizzy or have vision changes or ringing in the ears. As with any medicine, there is a very remote chance of a vaccine causing a severe allergic reaction, other serious injury, or death. 5. What if there is a serious problem? An allergic reaction could occur after the vaccinated person leaves the clinic.  If you see signs of a severe allergic reaction (hives, swelling of the face and throat, difficulty breathing, a fast heartbeat, dizziness, or weakness), call 9-1-1 and get the person to the nearest hospital.    For other signs that concern you, call your health care provider. Adverse reactions should be reported to the Vaccine Adverse Event Reporting System (VAERS). Your health care provider will usually file this report, or you can do it yourself. Visit the VAERS website at www.vaers. hhs.gov or call 5-608.972.5948. VAERS is only for reporting reactions, and VAERS staff do not give medical advice. 6. The National Vaccine Injury Compensation Program    The [facility] Vaccine Injury Compensation Program (VICP) is a federal program that was created to compensate people who may have been injured by certain vaccines. Visit the VICP website at www.[facility].gov/vaccinecompensation or call 7-342.170.3003 to learn about the program and about filing a claim. There is a time limit to file a claim for compensation. 7. How can I learn more?  Ask your health care provider.  Call your local or state health department.  Contact the Centers for Disease Control and Prevention (CDC):  - Call 6-676.708.2638 (1-671-USH-INFO) or  - Visit CDCs influenza website at www.cdc.gov/flu    Vaccine Information Statement (Interim)  Inactivated Influenza Vaccine   8/15/2019  42 IAN Alfaro [id number]   Department of Health and Human Services  Centers for Disease Control and Prevention    Office Use Only         High Blood Pressure: Care Instructions  Overview    It's normal for blood pressure to go up and down throughout the day. But if it stays up, you have high blood pressure. Another name for high blood pressure is hypertension. Despite what a lot of people think, high blood pressure usually doesn't cause headaches or make you feel dizzy or lightheaded. It usually has no symptoms. But it does increase your risk of stroke, heart attack, and other problems. You and your doctor will talk about your risks of these problems based on your blood pressure.   Your doctor will give you a goal for your blood pressure. Your goal will be based on your health and your age. Lifestyle changes, such as eating healthy and being active, are always important to help lower blood pressure. You might also take medicine to reach your blood pressure goal.  Follow-up care is a key part of your treatment and safety. Be sure to make and go to all appointments, and call your doctor if you are having problems. It's also a good idea to know your test results and keep a list of the medicines you take. How can you care for yourself at home? Medical treatment  · If you stop taking your medicine, your blood pressure will go back up. You may take one or more types of medicine to lower your blood pressure. Be safe with medicines. Take your medicine exactly as prescribed. Call your doctor if you think you are having a problem with your medicine. · Talk to your doctor before you start taking aspirin every day. Aspirin can help certain people lower their risk of a heart attack or stroke. But taking aspirin isn't right for everyone, because it can cause serious bleeding. · See your doctor regularly. You may need to see the doctor more often at first or until your blood pressure comes down. · If you are taking blood pressure medicine, talk to your doctor before you take decongestants or anti-inflammatory medicine, such as ibuprofen. Some of these medicines can raise blood pressure. · Learn how to check your blood pressure at home. Lifestyle changes  · Stay at a healthy weight. This is especially important if you put on weight around the waist. Losing even 10 pounds can help you lower your blood pressure. · If your doctor recommends it, get more exercise. Walking is a good choice. Bit by bit, increase the amount you walk every day. Try for at least 30 minutes on most days of the week. You also may want to swim, bike, or do other activities. · Avoid or limit alcohol. Talk to your doctor about whether you can drink any alcohol.   · Try to limit how much sodium you eat to less than 2,300 milligrams (mg) a day. Your doctor may ask you to try to eat less than 1,500 mg a day. · Eat plenty of fruits (such as bananas and oranges), vegetables, legumes, whole grains, and low-fat dairy products. · Lower the amount of saturated fat in your diet. Saturated fat is found in animal products such as milk, cheese, and meat. Limiting these foods may help you lose weight and also lower your risk for heart disease. · Do not smoke. Smoking increases your risk for heart attack and stroke. If you need help quitting, talk to your doctor about stop-smoking programs and medicines. These can increase your chances of quitting for good. When should you call for help? Call 911 anytime you think you may need emergency care. This may mean having symptoms that suggest that your blood pressure is causing a serious heart or blood vessel problem. Your blood pressure may be over 180/120. For example, call 911 if:    · You have symptoms of a heart attack. These may include:  ? Chest pain or pressure, or a strange feeling in the chest.  ? Sweating. ? Shortness of breath. ? Nausea or vomiting. ? Pain, pressure, or a strange feeling in the back, neck, jaw, or upper belly or in one or both shoulders or arms. ? Lightheadedness or sudden weakness. ? A fast or irregular heartbeat. · You have symptoms of a stroke. These may include:  ? Sudden numbness, tingling, weakness, or loss of movement in your face, arm, or leg, especially on only one side of your body. ? Sudden vision changes. ? Sudden trouble speaking. ? Sudden confusion or trouble understanding simple statements. ? Sudden problems with walking or balance. ? A sudden, severe headache that is different from past headaches. · You have severe back or belly pain. Do not wait until your blood pressure comes down on its own. Get help right away.    Call your doctor now or seek immediate care if:    · Your blood pressure is much higher than normal (such as 180/120 or higher), but you don't have symptoms. · You think high blood pressure is causing symptoms, such as:  ? Severe headache.  ? Blurry vision. Watch closely for changes in your health, and be sure to contact your doctor if:    · Your blood pressure measures higher than your doctor recommends at least 2 times. That means the top number is higher or the bottom number is higher, or both. · You think you may be having side effects from your blood pressure medicine. Where can you learn more? Go to http://leandra-giovanni.info/. Enter U495 in the search box to learn more about \"High Blood Pressure: Care Instructions. \"  Current as of: July 22, 2018  Content Version: 12.1  © 5368-5411 [employer]. Care instructions adapted under license by [employer] (which disclaims liability or warranty for this information). If you have questions about a medical condition or this instruction, always ask your healthcare professional. [address] any warranty or liability for your use of this information. High Cholesterol: Care Instructions  Your Care Instructions    Cholesterol is a type of fat in your blood. It is needed for many body functions, such as making new cells. Cholesterol is made by your body. It also comes from food you eat. High cholesterol means that you have too much of the fat in your blood. This raises your risk of a heart attack and stroke. LDL and HDL are part of your total cholesterol. LDL is the \"bad\" cholesterol. High LDL can raise your risk for heart disease, heart attack, and stroke. HDL is the \"good\" cholesterol. It helps clear bad cholesterol from the body. High HDL is linked with a lower risk of heart disease, heart attack, and stroke. Your cholesterol levels help your doctor find out your risk for having a heart attack or stroke.  You and your doctor can talk about whether you need to lower your risk and what treatment is best for you. A heart-healthy lifestyle along with medicines can help lower your cholesterol and your risk. The way you choose to lower your risk will depend on how high your risk is for heart attack and stroke. It will also depend on how you feel about taking medicines. Follow-up care is a key part of your treatment and safety. Be sure to make and go to all appointments, and call your doctor if you are having problems. It's also a good idea to know your test results and keep a list of the medicines you take. How can you care for yourself at home? · Eat a variety of foods every day. Good choices include fruits, vegetables, whole grains (like oatmeal), dried beans and peas, nuts and seeds, soy products (like tofu), and fat-free or low-fat dairy products. · Replace butter, margarine, and hydrogenated or partially hydrogenated oils with olive and canola oils. (Canola oil margarine without trans fat is fine.)  · Replace red meat with fish, poultry, and soy protein (like tofu). · Limit processed and packaged foods like chips, crackers, and cookies. · Bake, broil, or steam foods. Don't cortez them. · Be physically active. Get at least 30 minutes of exercise on most days of the week. Walking is a good choice. You also may want to do other activities, such as running, swimming, cycling, or playing tennis or team sports. · Stay at a healthy weight or lose weight by making the changes in eating and physical activity listed above. Losing just a small amount of weight, even 5 to 10 pounds, can reduce your risk for having a heart attack or stroke. · Do not smoke. When should you call for help? Watch closely for changes in your health, and be sure to contact your doctor if:    · You need help making lifestyle changes. · You have questions about your medicine. Where can you learn more? Go to http://leandra-giovanni.info/.   Enter B551 in the search box to learn more about \"High Cholesterol: Care Instructions. \"  Current as of: July 22, 2018  Content Version: 12.1  © 6233-7525 [employer]. Care instructions adapted under license by [employer] (which disclaims liability or warranty for this information). If you have questions about a medical condition or this instruction, always ask your healthcare professional. [address] any warranty or liability for your use of this information. High Cholesterol: Care Instructions  Your Care Instructions    Cholesterol is a type of fat in your blood. It is needed for many body functions, such as making new cells. Cholesterol is made by your body. It also comes from food you eat. High cholesterol means that you have too much of the fat in your blood. This raises your risk of a heart attack and stroke. LDL and HDL are part of your total cholesterol. LDL is the \"bad\" cholesterol. High LDL can raise your risk for heart disease, heart attack, and stroke. HDL is the \"good\" cholesterol. It helps clear bad cholesterol from the body. High HDL is linked with a lower risk of heart disease, heart attack, and stroke. Your cholesterol levels help your doctor find out your risk for having a heart attack or stroke. You and your doctor can talk about whether you need to lower your risk and what treatment is best for you. A heart-healthy lifestyle along with medicines can help lower your cholesterol and your risk. The way you choose to lower your risk will depend on how high your risk is for heart attack and stroke. It will also depend on how you feel about taking medicines. Follow-up care is a key part of your treatment and safety. Be sure to make and go to all appointments, and call your doctor if you are having problems. It's also a good idea to know your test results and keep a list of the medicines you take. How can you care for yourself at home? · Eat a variety of foods every day. Good choices include fruits, vegetables, whole grains (like oatmeal), dried beans and peas, nuts and seeds, soy products (like tofu), and fat-free or low-fat dairy products. · Replace butter, margarine, and hydrogenated or partially hydrogenated oils with olive and canola oils. (Canola oil margarine without trans fat is fine.)  · Replace red meat with fish, poultry, and soy protein (like tofu). · Limit processed and packaged foods like chips, crackers, and cookies. · Bake, broil, or steam foods. Don't cortez them. · Be physically active. Get at least 30 minutes of exercise on most days of the week. Walking is a good choice. You also may want to do other activities, such as running, swimming, cycling, or playing tennis or team sports. · Stay at a healthy weight or lose weight by making the changes in eating and physical activity listed above. Losing just a small amount of weight, even 5 to 10 pounds, can reduce your risk for having a heart attack or stroke. · Do not smoke. When should you call for help? Watch closely for changes in your health, and be sure to contact your doctor if:    · You need help making lifestyle changes. · You have questions about your medicine. Where can you learn more? Go to http://leandra-giovanni.info/. Enter L037 in the search box to learn more about \"High Cholesterol: Care Instructions. \"  Current as of: July 22, 2018  Content Version: 12.1  © 3726-7501 [employer]. Care instructions adapted under license by [employer] (which disclaims liability or warranty for this information). If you have questions about a medical condition or this instruction, always ask your healthcare professional. [address] any warranty or liability for your use of this information.

## 2019-10-29 NOTE — PROGRESS NOTES
10/29/19  10:09 AM  had concerns including Follow Up Chronic Condition (HTN Cholesterol neck pain Room 9). HISTORY OF PRESENT ILLNESS   This is a 21 y.o. male with hypertension, hyperlipidemia, obesity, and who presents in follow-up. Right neck pain  This problem has resolved. No further symptoms are present. Hypertension   No problems with the meds. No side effects noted The patient has no headaches, visual changes, chest pain or pressure,dyspnea, orthopnea, abdominal pain, dysuria, weakness, or paresthesias   BP Readings from Last 3 Encounters:   10/29/19 130/58   09/27/19 134/67   08/23/19 109/67     Key CAD CHF Meds             carvedilol (COREG) 6.25 mg tablet (Taking) Take 1 Tab by mouth two (2) times daily (with meals). Indications: high blood pressure    lisinopril (PRINIVIL, ZESTRIL) 2.5 mg tablet (Taking) Take 1 Tab by mouth daily. Indications: high blood pressure    gemfibrozil (LOPID) 600 mg tablet (Taking) Take 1 Tab by mouth two (2) times a day. Hyperlipidemia  He is tolerating the meds well. He is exercising more The patient denies muscle aches, headache, GI symptoms or difficulty with mentation. Wt Readings from Last 3 Encounters:   10/29/19 (!) 370 lb (167.8 kg)   09/27/19 (!) 380 lb (172.4 kg)   08/23/19 (!) 381 lb (172.8 kg)     Cardiomyopathy   The patient denies any chest pain pressure or palpitations. No orthopnea is admitted to. He was last seen by cardiology this is a chronic problem. The problem has been resolved. Associated symptoms include shortness of breath. Pertinent negatives include no chest pain, no abdominal pain and no headaches. His last echocardiogram was from August 2019 his ejection fraction had increased to 55 to 60%. No regional wall motion abnormality was noted. There was normal right ventricular size and function. He was felt to be improved.         Current Outpatient Medications:     methocarbamol (ROBAXIN) 500 mg tablet, TAKE 1 TABLET BY MOUTH 3 TIMES A DAY, Disp: 20 Tab, Rfl: 0    lisinopril (PRINIVIL, ZESTRIL) 2.5 mg tablet, Take 1 Tab by mouth daily. Indications: high blood pressure, Disp: 90 Tab, Rfl: 1    carvedilol (COREG) 6.25 mg tablet, Take 1 Tab by mouth two (2) times daily (with meals). Indications: high blood pressure, Disp: 180 Tab, Rfl: 1    gemfibrozil (LOPID) 600 mg tablet, Take 1 Tab by mouth two (2) times a day., Disp: 60 Tab, Rfl: 5    fluticasone propionate (FLONASE) 50 mcg/actuation nasal spray, One squirt each nostril BID, Disp: 1 Bottle, Rfl: 4    tiotropium bromide (SPIRIVA RESPIMAT) 1.25 mcg/actuation inhaler, Take 2 Puffs by inhalation daily. , Disp: 1 Inhaler, Rfl: 11    albuterol (VENTOLIN HFA) 90 mcg/actuation inhaler, Take 2 Puffs by inhalation every six (6) hours as needed. , Disp: 1 Inhaler, Rfl: 5    ketoconazole (NIZORAL) 2 % shampoo, Apply as shampoo 3 times per week., Disp: 120 mL, Rfl: 11    naproxen (NAPROSYN) 500 mg tablet, Take 1 Tab by mouth two (2) times daily (with meals). Indications: Pain, Disp: 60 Tab, Rfl: 11    triamcinolone acetonide (KENALOG) 0.1 % topical cream, Apply  to affected area two (2) times daily as needed for Skin Irritation. , Disp: 15 g, Rfl: 11    guaiFENesin ER (MUCINEX) 600 mg ER tablet, Take 1 Tab by mouth two (2) times a day., Disp: 20 Tab, Rfl: 1  Allergies   Allergen Reactions    Penicillins Unable to Obtain     Active Ambulatory Problems     Diagnosis Date Noted    Palpitations 05/05/2014    Shortness of breath 05/05/2014    Other specified cardiac dysrhythmias(427.89) 05/05/2014    Cardiomyopathy ([facility] 09/10/2015    Essential hypertension with goal blood pressure less than 140/90 07/28/2016    [facility]    Seborrheic dermatitis 02/05/2018    Vitamin D deficiency 02/05/2018    Moderate persistent asthma without complication 84/39/5604    Bilateral low back pain without sciatica 02/05/2018    Hyperlipidemia 05/29/2019     Resolved Ambulatory Problems Diagnosis Date Noted    No Resolved Ambulatory Problems     Past Medical History:   Diagnosis Date    Asthma 5/5/2014    Hypertension        Review of Systems   Constitutional: Negative for chills and fever. HENT: Negative for congestion, ear discharge, ear pain, nosebleeds and sinus pain. Respiratory: Negative for shortness of breath. Cardiovascular: Negative for chest pain, palpitations and leg swelling. Gastrointestinal: Negative for nausea and vomiting. Genitourinary: Negative for dysuria. Musculoskeletal: Positive for myalgias and neck pain. Skin: Negative for rash. Seborrhea     Neurological: Negative for tingling, focal weakness and headaches. Psychiatric/Behavioral: The patient does not have insomnia.         Results for orders placed or performed in visit on 08/02/19   ECHO ADULT COMPLETE   Result Value Ref Range    LA Volume 57.1 18 - 58 mL    Ao Root D 3.41 cm    AO ASC D 3.22 cm    Aortic Valve Systolic Peak Velocity 147.59 cm/s    AoV PG 8.3 mmHg    LVIDd 3.11 (A) 4.2 - 5.9 cm    LVPWd 1.48 (A) 0.6 - 1.0 cm    LVIDs 1.94 cm    IVSd 1.61 (A) 0.6 - 1.0 cm    LVOT Peak Velocity 121.69 cm/s    LVOT Peak Gradient 5.9 mmHg    LV E' Septal Velocity 11.90 cm/s    LV E' Lateral Velocity 11.77 cm/s    MVA (PHT) 5.3 cm2    MV A Dmitriy 50.94 cm/s    MV E Dmitriy 109.92 cm/s    MV E/A 2.16     RVIDd 3.58 cm    LA Vol 4C 44.09 18 - 58 mL    LA Vol 2C 57.08 18 - 58 mL    LA Area 2C 20.52 cm2    LA Area 4C 15.8 cm2    LV Mass .1 88 - 224 g    LV Mass AL Index 74.8 49 - 115 g/m2    E/E' lateral 9.34     E/E' septal 9.24     Right Atrial Area 4C 12.8 cm2    MV \"A\" wave duration 107.2 msec    E/E' ratio (averaged) 9.29     Mitral Valve E Wave Deceleration Time 142.3 ms    Mitral Valve Pressure Half-time 41.3 ms    Left Atrium Major Axis 4.22 cm    Tapse 2.18 (A) 1.5 - 2.0 cm    Pulmonic Valve Max Velocity 165.57 cm/s    LA Vol Index 21.02 16 - 28 ml/m2    LA Vol Index 21.01 16 - 28 ml/m2    LA Vol Index 16.23 16 - 28 ml/m2    PV peak gradient 11.0 mmHg       Visit Vitals  /58 (BP 1 Location: Right arm, BP Patient Position: Sitting)   Pulse 75   Temp 97.4 °F (36.3 °C) (Oral)   Resp 18   Ht 5' 9\" (1.753 m)   Wt (!) 370 lb (167.8 kg)   SpO2 95%   BMI 54.64 kg/m²         Physical Exam   Constitutional: He is oriented to person, place, and time. He appears well-developed and well-nourished. No distress. HENT:   Right Ear: External ear normal.   Left Ear: External ear normal.   Nose: Nose normal.   Nasal congestion is noted with difficulty breathing and. Orally there are no lesions in the ears look normal.   Neck: Neck supple. No thyromegaly present. Carotid bruit absent   Cardiovascular: Normal rate, regular rhythm and intact distal pulses. Exam reveals no gallop and no friction rub. No murmur heard. Pulmonary/Chest: Effort normal and breath sounds normal. No respiratory distress. Musculoskeletal: He exhibits no edema or tenderness (Right side of the neck aggravated by rotation of the neck to the right and right lateral extension). Lymphadenopathy:     He has no cervical adenopathy. Neurological: He is alert and oriented to person, place, and time. Skin: Skin is warm and dry. Psychiatric: He has a normal mood and affect. Nice gentleman, likes to tell jokes   Nursing note and vitals reviewed. ASSESSMENT and PLAN    ICD-10-CM ICD-9-CM    1. Encounter for immunization Z23 V03.89 INFLUENZA VIRUS VAC QUAD,SPLIT,PRESV FREE SYRINGE IM   2. Essential hypertension with goal blood pressure less than 140/90Chronic I10 401.9 carvedilol (COREG) 6.25 mg tablet      lisinopril (PRINIVIL, ZESTRIL) 2.5 mg tablet    Good control continue present medications refills ordered.    3. Morbid obesity ([facility] E66.01 278.01     His weight is improving continue present medications         reviewed diet, exercise and weight control

## 2019-10-29 NOTE — PROGRESS NOTES
Chief Complaint   Patient presents with    Follow Up Chronic Condition     HTN Cholesterol neck pain Room 9

## 2019-10-30 ENCOUNTER — HOSPITAL ENCOUNTER (OUTPATIENT)
Dept: PHYSICAL THERAPY | Age: 24
Discharge: HOME | End: 2019-10-30
Payer: MEDICAID

## 2019-10-30 PROCEDURE — 97110 THERAPEUTIC EXERCISES: CPT

## 2019-10-30 PROCEDURE — 97140 MANUAL THERAPY 1/> REGIONS: CPT

## 2019-10-30 NOTE — PROGRESS NOTES
PT DAILY TREATMENT NOTE 10-18    Patient Name: Bennie Wood  Date:10/30/2019  : 1995  [x]  Patient  Verified  Payor: Sylvia Tubac / Plan: [facility] COMMUNITY PLAN [employer] / Product Type: Managed Care Medicaid /    In time: 3:00  Out time: 3:40  Total Treatment Time (min): 40  Visit #: 6 of     Treatment Area: Pain in right shoulder [M25.511]  Pain in left shoulder [M25.512]  Bursitis of right shoulder [M75.51]  Bursitis of left shoulder [M75.52]    SUBJECTIVE  Pain Level (0-10 scale):  5/10  Any medication changes, allergies to medications, adverse drug reactions, diagnosis change, or new procedure performed?: [x] No    [] Yes (see summary sheet for update)  Subjective functional status/changes:   [] No changes reported  Pt reports an increased in left shoulder pain along his collarbone when trying to lift a heavy object. He lost 11 pounds since his last MD appt. He feels his posture is improving and he is working on moving more at home.        OBJECTIVE    30 min Therapeutic Exercise:  [x] See flow sheet :   Rationale: increase ROM and increase strength to improve the patients ability to increase ease of reaching overhead and improving posture    10 minutes of manual therapy for grade III SC inferior joint mobs on the left and grade III AC joint mobs on the left with TPR to the left pectoralis minor muscle for ease of OH reaching and lifting          With   [x] TE   [] TA   [] neuro   [] other: Patient Education: [x] Review HEP    [] Progressed/Changed HEP based on:   [] positioning   [] body mechanics   [] transfers   [] heat/ice application    [] other:      Other Objective/Functional Measures:   Decreased pain post manual with OH reaching to 1/10  TTP pectorals and educated on TPR  Continues with t/s kyphosis  Improving t/s mobility noted during open books    Pain Level (0-10 scale) post treatment:  1/10    ASSESSMENT/Changes in Function: Pt progressing with overall decreased pain and improving posture awareness. He continues with t/s kyphosis with decreased posterior shoulder strength. He needs continued work on improved lifting mechanics with less forward posture. Progress towards goals / Updated goals:  Short term goals: To be accomplished within 1 week   1. Pt will be independent with HEP to maintain progression. met  Long term goals: To be accomplished within 4 weeks  1. Pt will improve FOTO score by 15 points to 73/100 to show improvement with functional mobility performance. 2. Pt will report improved pain to 0-2/10 so he can perform ADLs with ease.   Not met: 6/10 (10/23/19)  3. Pt will have B LT strength improved to 4/5 at least to be able to perform ADLs comfortably.        PLAN  [x]  Upgrade activities as tolerated     [x]  Continue plan of care  []  Update interventions per flow sheet       []  Discharge due to:_  []  Other:_      Claudette Gould PTA 10/30/2019  11:34 AM    Future Appointments   Date Time Provider Edita Valdovinosi   11/5/2019  3:00 PM Justus Kirk PT [facility]   11/7/2019 12:30 PM Kristy London PTA [facility]   11/11/2019 12:00 PM Josseline Leroy PT [facility]   11/13/2019 12:00 PM [facility]eline Leroy [facility]   2/25/2020  8:15 AM Cindy Akhtar MD [facility]   8/21/2020  9:00 AM Bertin Marinelli MD [address] [place] [address]

## 2019-11-05 ENCOUNTER — HOSPITAL ENCOUNTER (OUTPATIENT)
Dept: PHYSICAL THERAPY | Age: 24
Discharge: HOME | End: 2019-11-05
Payer: MEDICAID

## 2019-11-05 PROCEDURE — 97110 THERAPEUTIC EXERCISES: CPT

## 2019-11-05 NOTE — PROGRESS NOTES
PT DAILY TREATMENT NOTE 10-18    Patient Name: Sis Gruber  Date:2019  : 1995  [x]  Patient  Verified  Payor: Sony Flores / Plan: [facility] COMMUNITY PLAN [facility] / Product Type: Managed Care Medicaid /    In time: 3:00  Out time: 3:40  Total Treatment Time (min): 40  Visit #: 7 of -    Treatment Area: Pain in right shoulder [M25.511]  Pain in left shoulder [M25.512]  Bursitis of right shoulder [M75.51]  Bursitis of left shoulder [M75.52]    SUBJECTIVE  Pain Level (0-10 scale):  1/10  Any medication changes, allergies to medications, adverse drug reactions, diagnosis change, or new procedure performed?: [x] No    [] Yes (see summary sheet for update)  Subjective functional status/changes:   [] No changes reported  Pt with decreased shoulder pain when reaching overhead. OBJECTIVE    40 min Therapeutic Exercise:  [x] See flow sheet :   Rationale: increase ROM and increase strength to improve the patients ability to increase ease of reaching overhead and improving posture          With   [x] TE   [] TA   [] neuro   [] other: Patient Education: [x] Review HEP    [] Progressed/Changed HEP based on:   [] positioning   [] body mechanics   [] transfers   [] heat/ice application    [] other:      Other Objective/Functional Measures: Added farmers carries with 10lb kettlebells   Added 1/2 foam T-S stretch sitting  Added FR Ys on wall    Pain Level (0-10 scale) post treatment:  0/10    ASSESSMENT/Changes in Function: Pt required manual cueing throughout entirety of session for LT facilitation and decreased compensatory L>R UT. Progress towards goals / Updated goals:  Short term goals: To be accomplished within 1 week   1. Pt will be independent with HEP to maintain progression. met  Long term goals: To be accomplished within 4 weeks  1. Pt will improve FOTO score by 15 points to 73/100 to show improvement with functional mobility performance.   2. Pt will report improved pain to 0-2/10 so he can perform ADLs with ease.   PROGRESSING 1/10 VAS 11/5/19  3. Pt will have B LT strength improved to 4/5 at least to be able to perform ADLs comfortably.        PLAN  [x]  Upgrade activities as tolerated     [x]  Continue plan of care  []  Update interventions per flow sheet       []  Discharge due to:_  []  Other:_      Vivek Chavez, PT 11/5/2019  11:34 AM    Future Appointments   Date Time Provider Edita Veras   11/7/2019 12:30 PM Wells Serum, PTA [facility]   11/11/2019 12:00 PM Sally Northern, PT [facility]   11/13/2019 12:00 PM Sally Northern, PT [facility]   2/25/2020  8:15 AM Jose Roldan MD [facility]   8/21/2020  9:00 AM Roshan Ireland MD [address] [place] [address]

## 2019-11-07 ENCOUNTER — HOSPITAL ENCOUNTER (OUTPATIENT)
Dept: PHYSICAL THERAPY | Age: 24
Discharge: HOME | End: 2019-11-07
Payer: MEDICAID

## 2019-11-07 PROCEDURE — 97110 THERAPEUTIC EXERCISES: CPT

## 2019-11-07 NOTE — PROGRESS NOTES
PT DAILY TREATMENT NOTE 10-18    Patient Name: Ferny Galo  Date:2019  : 1995  [x]  Patient  Verified  Payor: Shazia Organ / Plan: [facility] COMMUNITY PLAN [facility] CCCP / Product Type: Managed Care Medicaid /    In time: 12:30  Out time: 1:01  Total Treatment Time (min): 31  Visit #: 8 of     Treatment Area: Pain in right shoulder [M25.511]  Pain in left shoulder [M25.512]  Bursitis of right shoulder [M75.51]  Bursitis of left shoulder [M75.52]    SUBJECTIVE  Pain Level (0-10 scale):  0/10  Any medication changes, allergies to medications, adverse drug reactions, diagnosis change, or new procedure performed?: [x] No    [] Yes (see summary sheet for update)  Subjective functional status/changes:   [] No changes reported  Pt reports no current pain. He notes average of 1/10 pain. He has been doing his exercises at home and taking more breaks from playing video games. OBJECTIVE    31 min Therapeutic Exercise:  [x] See flow sheet :   Rationale: increase ROM and increase strength to improve the patients ability to increase ease of reaching overhead and improving posture          With   [x] TE   [] TA   [] neuro   [] other: Patient Education: [x] Review HEP    [] Progressed/Changed HEP based on:   [] positioning   [] body mechanics   [] transfers   [] heat/ice application    [] other:      Other Objective/Functional Measures: FOTO: 80  Lower trap: 3/5  Average pain: 1/10    Pain Level (0-10 scale) post treatment:  0/10    ASSESSMENT/Changes in Function: See Progress Note. Progress towards goals / Updated goals:  Short term goals: To be accomplished within 1 week   1. Pt will be independent with HEP to maintain progression. met  Long term goals: To be accomplished within 4 weeks  1. Pt will improve FOTO score by 15 points to 73/100 to show improvement with functional mobility performance. MET 85  2. Pt will report improved pain to 0-2/10 so he can perform ADLs with ease.   PROGRESSING 1/10 VAS 11/5/19 MET  3. Pt will have B LT strength improved to 4/5 at least to be able to perform ADLs comfortably.    Not met 3/5     PLAN  [x]  Upgrade activities as tolerated     [x]  Continue plan of care  []  Update interventions per flow sheet       []  Discharge due to:_  []  Other:_      Pierre Tobin PTA 11/7/2019  11:34 AM    Future Appointments   Date Time Provider Edita Veras   11/7/2019 12:30 PM [facility] [address]   11/11/2019 12:00 PM Rikki Griffin PTA [facility] [address]   11/13/2019 12:00 PM Let[facility] Horton, [facility] [address]   2/25/2020  8:15 AM Little Speaker MD [facility]   8/21/2020  9:00 AM Loren Lloyd MD [address] [place] [address]

## 2019-11-07 NOTE — PROGRESS NOTES
[facility] [place] [facility] [facility]  [address] [place] [address]  [phone] fax    Physical Therapy Progress Note  Patient name: Clive Millan Start of Care: 10/10/2019   Referral source: Juan Hines : 1995   Medical/Treatment Diagnosis: Pain in right shoulder [M25.511]  Pain in left shoulder [M25.512]  Bursitis of right shoulder [M75.51]  Bursitis of left shoulder [M75.52]  Payor: [facility] MEDICAID / Plan: [facility] COMMUNITY PLAN [employer] / Product Type: Managed Care Medicaid /  Onset Date:about 4 weeks ago     Prior Hospitalization: see medical history Provider#: [id number]   Medications: Verified on Patient Summary List    Comorbidities: HTN, asthma  Prior Level of Function: ind with all mobility, Right handed, 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      Visits from Start of Care: 8   Missed Visits: 0    Established Goals:         Excellent           Good         Limited           None  [] Increased ROM   []  []  []  []  [x] Increased Strength  []  []  [x]  []  [x] Increased Mobility  []  [x]  []  []   [x] Decreased Pain   []  [x]  []  []  [] Decreased Swelling  []  []  []  []    Key Functional Changes: Average pain 1/10; compliant with HEP; decreased video game playing for more active lifestyle; improved posture awareness; lost 11 lbs since last doctor visit    Short term goals: To be accomplished within 1 week   1. Pt will be independent with HEP to maintain progression. met  Long term goals: To be accomplished within 4 weeks  1. Pt will improve FOTO score by 15 points to 73/100 to show improvement with functional mobility performance. MET 85  2. Pt will report improved pain to 0-2/10 so he can perform ADLs with ease. PROGRESSING 1/10 VAS 19 MET  3. Pt will have B LT strength improved to 4/5 at least to be able to perform ADLs comfortably.    Not met 3/5       Updated Goals: to be achieved in 4 weeks:  1.  Pt will have B LT strength improved to 4/5 at [facility] be able to perform ADLs comfortably.   2. Pt will be independent with final HEP for symptom management and decreased pain with lifting/carrying. ASSESSMENT/RECOMMENDATIONS: Mr. Jovanny Acevedo is making good progress in therapy with decreased average shoulder pain to 1/10. He has improved postural awareness and has been compliant with his HEP. He is taking more breaks from video game and computer playing to help with active lifestyle and improved posture. He has significant improvement in FOTO score indicating functional improvement. He continues to have t/s kyphosis due to longevity of postural deficits with decreased lower trap strength. Skilled PT remains medically necessary to further improve postural muscles for decreased pain with lifting and carrying to prevent reoccurrence of pain symptoms. [x]Continue therapy per initial plan/protocol at a frequency of  2 x per week for 4 weeks  []Continue therapy with the following recommended changes:_____________________      _____________________________________________________________________  []Discontinue therapy progressing towards or have reached established goals  []Discontinue therapy due to lack of appreciable progress towards goals  []Discontinue therapy due to lack of attendance or compliance  []Await Physician's recommendations/decisions regarding therapy  []Other:________________________________________________________________    Thank you for this referral.   Vik Solis, PTA 11/7/2019 3:14 PM    NOTE TO PHYSICIAN:  [address] TO [facility]: [phone]  If you are unable to process this request in 24 hours please contact our office: [phone] I have read the above report and request that my patient continue as recommended. ? I have read the above report and request that my patient continue therapy with the following changes/special instructions:____________________________________  ?  I have read the above report and request that my patient be discharged from therapy.     Physicians signature: ______________________________Date: ______Time:______

## 2019-11-11 ENCOUNTER — HOSPITAL ENCOUNTER (OUTPATIENT)
Dept: PHYSICAL THERAPY | Age: 24
Discharge: HOME | End: 2019-11-11
Payer: MEDICAID

## 2019-11-11 PROCEDURE — 97110 THERAPEUTIC EXERCISES: CPT

## 2019-11-11 NOTE — PROGRESS NOTES
PT DAILY TREATMENT NOTE 10-18    Patient Name: Ashia Sanchez  Date:2019  : 1995  [x]  Patient  Verified  Payor: Rufus Colon / Plan: [facility] COMMUNITY PLAN [facility] / Product Type: Managed Care Medicaid /    In time: 12:00  Out time: 12:36  Total Treatment Time (min):36  Visit #: 1 of 8    Treatment Area: Pain in right shoulder [M25.511]  Pain in left shoulder [M25.512]  Bursitis of right shoulder [M75.51]  Bursitis of left shoulder [M75.52]    SUBJECTIVE  Pain Level (0-10 scale):  1/10  Any medication changes, allergies to medications, adverse drug reactions, diagnosis change, or new procedure performed?: [x] No    [] Yes (see summary sheet for update)  Subjective functional status/changes:   [] No changes reported  Pt reports continuing to do exercises at home and having less pain. He notes his last day is Wednesday. OBJECTIVE    36 min Therapeutic Exercise:  [x] See flow sheet :   Rationale: increase ROM and increase strength to improve the patients ability to increase ease of reaching overhead and improving posture          With   [x] TE   [] TA   [] neuro   [] other: Patient Education: [x] Review HEP    [] Progressed/Changed HEP based on:   [] positioning   [] body mechanics   [] transfers   [] heat/ice application    [] other:      Other Objective/Functional Measures:   Challenged with TB W and horizontal abduction  Fatigue with S/L ER  Improved posture but still has t/s kyphosis  Educated on prone exercises to further work on posture  No increased pain during session    Pain Level (0-10 scale) post treatment:  1/10    ASSESSMENT/Changes in Function: Pt making good progress towards final goals with decrease in shoulder pain. He has improved posture awareness but continues with t/s kyphosis due to longevity of computer and video game use. He will be given final HEP next session with review prior to D/C pending no further increase in pain and good independence with strengthening. Updated Goals: to be achieved in 4 weeks:  1. Pt will have B LT strength improved to 4/5 at least to be able to perform ADLs comfortably.   2. Pt will be independent with final HEP for symptom management and decreased pain with lifting/carrying.      PLAN  [x]  Upgrade activities as tolerated     [x]  Continue plan of care  []  Update interventions per flow sheet       []  Discharge due to:_  []  Other:_      Ally Lynn PTA 11/11/2019  11:34 AM    Future Appointments   Date Time Provider Edita Veras   11/13/2019 12:00 PM Ramirez Vieyra, PT [id number]   2/25/2020  8:15 AM Raiza Spencer MD [facility]   8/21/2020  9:00 AM Maryan Long MD [address] [place] [address]

## 2019-11-13 ENCOUNTER — HOSPITAL ENCOUNTER (OUTPATIENT)
Dept: PHYSICAL THERAPY | Age: 24
Discharge: HOME | End: 2019-11-13
Payer: MEDICAID

## 2019-11-13 PROCEDURE — 97110 THERAPEUTIC EXERCISES: CPT

## 2019-11-13 NOTE — PROGRESS NOTES
PT DAILY TREATMENT NOTE 10-18    Patient Name: David Wiggins  Date:2019  : 1995  [x]  Patient  Verified  Payor: Corinne Blackman / Plan: [facility] COMMUNITY PLAN [facility] / Product Type: Managed Care Medicaid /    In time: 12:09  Out time: 12:38  Total Treatment Time (min): 29  Visit #: 2 of 8    Treatment Area: Pain in right shoulder [M25.511]  Pain in left shoulder [M25.512]  Bursitis of right shoulder [M75.51]  Bursitis of left shoulder [M75.52]    SUBJECTIVE  Pain Level (0-10 scale):  1/10  Any medication changes, allergies to medications, adverse drug reactions, diagnosis change, or new procedure performed?: [x] No    [] Yes (see summary sheet for update)  Subjective functional status/changes:   [] No changes reported  Pt. Reports he is doing pretty good today. He is agreeable to D/C today. OBJECTIVE    29 min Therapeutic Exercise:  [x] See flow sheet :   Rationale: increase ROM and increase strength to improve the patients ability to increase ease of ambulation           With   [x] TE   [] TA   [] neuro   [] other: Patient Education: [x] Review HEP    [] Progressed/Changed HEP based on:   [] positioning   [] body mechanics   [] transfers   [] heat/ice application    [] other:      Other Objective/Functional Measures:   LT MMT right: 4-/5 left: 4-/5  Pt. Demonstrates good understanding of updated HEP  Pt.  Tolerated PT well with no reports of increased pain       Pain Level (0-10 scale) post treatment:  1/10    ASSESSMENT/Changes in Function:     []  See Plan of Care  []  See progress note/recertification  [x]  See Discharge Summary         Progress towards goals / Updated goals:  See D/C note    PLAN  []  Upgrade activities as tolerated     []  Continue plan of care  []  Update interventions per flow sheet       [x]  Discharge due to:_ progress towards goals  []  Other:_      Mohsen Gupta, PT 2019  7:47 AM    Future Appointments   Date Time Provider Edita Veras   2019 12:00 PM Becka Richmond, PT [facility]   2/25/2020  8:15 AM Bob Sutton MD [facility]   8/21/2020  9:00 AM Sabrina Zapien MD [facility] 10.

## 2019-11-13 NOTE — PROGRESS NOTES
[facility] - Jayda Horne  [address]  [phone] fax    Physical Therapy Discharge Summary    Patient name: Blane Ross Start of Care: 10/10/2019   Referral source: Leopoldo Player : 1995   Medical/Treatment Diagnosis: Pain in right shoulder [M25.511]  Pain in left shoulder [M25.512]  Bursitis of right shoulder [M75.51]  Bursitis of left shoulder [M75.52]  Payor: [facility] MEDICAID / Plan: [address] [place] [address] [facility] / Product Type: Managed Care Medicaid /  Onset Date:about 4 weeks ago      Prior Hospitalization: see medical history Provider#: [id number]   Medications: Verified on Patient Summary List     Comorbidities: HTN, asthma  Prior Level of Function: ind with all mobility, Right handed, 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          Visits from Start of Care: 10    Missed Visits: 0    Reporting Period : 19 to 19    Summary of Care:  Goal: Pt will have B LT strength improved to 4/5 at least to be able to perform ADLs comfortably.   Status at last note/certification: 3/5  Status at discharge: not met    Goal: Pt will be independent with final HEP for symptom management and decreased pain with lifting/carrying. Status at last note/certification: n/a  Status at discharge: met    Pt. Has progressed well with physical therapy. He is having decreased pain overall at 1/10 and FOTO score improved to 85 points indicating a significant improvement in function. He continues to have decreased B lower trap strength at 4-/5. He was educated on a HEP in order to continue to improve shoulder strength following D/C.      ASSESSMENT/RECOMMENDATIONS:  [x]Discontinue therapy: [x]Patient has reached or is progressing toward set goals      []Patient is non-compliant or has abdicated      []Due to lack of appreciable progress towards set goals    Ede Sandhu, PT 2019 7:47 AM

## 2020-01-25 ENCOUNTER — HOSPITAL ENCOUNTER (EMERGENCY)
Age: 25
Discharge: HOME | End: 2020-01-25
Attending: EMERGENCY MEDICINE
Payer: MEDICAID

## 2020-01-25 ENCOUNTER — APPOINTMENT (OUTPATIENT)
Dept: GENERAL RADIOLOGY | Age: 25
End: 2020-01-25
Attending: PHYSICIAN ASSISTANT
Payer: MEDICAID

## 2020-01-25 VITALS
RESPIRATION RATE: 18 BRPM | HEART RATE: 99 BPM | DIASTOLIC BLOOD PRESSURE: 80 MMHG | OXYGEN SATURATION: 98 % | TEMPERATURE: 98.3 F | SYSTOLIC BLOOD PRESSURE: 104 MMHG

## 2020-01-25 DIAGNOSIS — R07.9 CHEST PAIN, UNSPECIFIED TYPE: Primary | ICD-10-CM

## 2020-01-25 LAB
ANION GAP SERPL CALC-SCNC: 4 MMOL/L (ref 3–18)
BASOPHILS # BLD: 0 K/UL (ref 0–0.1)
BASOPHILS NFR BLD: 0 % (ref 0–2)
BUN SERPL-MCNC: 18 MG/DL (ref 7–18)
BUN/CREAT SERPL: 21 (ref 12–20)
CALCIUM SERPL-MCNC: 9.2 MG/DL (ref 8.5–10.1)
CHLORIDE SERPL-SCNC: 106 MMOL/L (ref 100–111)
CK MB CFR SERPL CALC: NORMAL % (ref 0–4)
CK MB SERPL-MCNC: <1 NG/ML (ref 5–25)
CK SERPL-CCNC: 82 U/L (ref 39–308)
CO2 SERPL-SCNC: 28 MMOL/L (ref 21–32)
CREAT SERPL-MCNC: 0.87 MG/DL (ref 0.6–1.3)
DIFFERENTIAL METHOD BLD: ABNORMAL
EOSINOPHIL # BLD: 0.1 K/UL (ref 0–0.4)
EOSINOPHIL NFR BLD: 1 % (ref 0–5)
ERYTHROCYTE [DISTWIDTH] IN BLOOD BY AUTOMATED COUNT: 13.9 % (ref 11.6–14.5)
GLUCOSE SERPL-MCNC: 81 MG/DL (ref 74–99)
HCT VFR BLD AUTO: 42.6 % (ref 36–48)
HGB BLD-MCNC: 14.6 G/DL (ref 13–16)
LYMPHOCYTES # BLD: 2.6 K/UL (ref 0.9–3.6)
LYMPHOCYTES NFR BLD: 28 % (ref 21–52)
MCH RBC QN AUTO: 29.1 PG (ref 24–34)
MCHC RBC AUTO-ENTMCNC: 34.3 G/DL (ref 31–37)
MCV RBC AUTO: 85 FL (ref 74–97)
MONOCYTES # BLD: 1.1 K/UL (ref 0.05–1.2)
MONOCYTES NFR BLD: 11 % (ref 3–10)
NEUTS SEG # BLD: 5.7 K/UL (ref 1.8–8)
NEUTS SEG NFR BLD: 60 % (ref 40–73)
PLATELET # BLD AUTO: 420 K/UL (ref 135–420)
PMV BLD AUTO: 10.7 FL (ref 9.2–11.8)
POTASSIUM SERPL-SCNC: 4 MMOL/L (ref 3.5–5.5)
RBC # BLD AUTO: 5.01 M/UL (ref 4.7–5.5)
SODIUM SERPL-SCNC: 138 MMOL/L (ref 136–145)
TROPONIN I SERPL-MCNC: <0.02 NG/ML (ref 0–0.04)
WBC # BLD AUTO: 9.5 K/UL (ref 4.6–13.2)

## 2020-01-25 PROCEDURE — 80048 BASIC METABOLIC PNL TOTAL CA: CPT

## 2020-01-25 PROCEDURE — 85025 COMPLETE CBC W/AUTO DIFF WBC: CPT

## 2020-01-25 PROCEDURE — 93005 ELECTROCARDIOGRAM TRACING: CPT

## 2020-01-25 PROCEDURE — 71046 X-RAY EXAM CHEST 2 VIEWS: CPT

## 2020-01-25 PROCEDURE — 82550 ASSAY OF CK (CPK): CPT

## 2020-01-25 PROCEDURE — 99283 EMERGENCY DEPT VISIT LOW MDM: CPT

## 2020-01-25 NOTE — ED PROVIDER NOTES
EMERGENCY DEPARTMENT HISTORY AND PHYSICAL EXAM    12:40 PM  Date: 1/25/2020  Patient Name: Stevan Song    History of Presenting Illness     No chief complaint on file. History Provided By: Patient    HPI: Stevan Song is a 21 y.o. male with history of asthma and hypertension. Patient is presenting with substernal stabbing chest pain that is been constantly there for the past month. Pain does not radiate and not related to exertion. Denies shortness of breath, cough or fever. No prior similar episodes. No history of recent immobilization or prior VTE. No nausea or vomiting. Location:  Severity:  Timing/course: Onset/Duration:     PCP: Brendan Joyner MD    Past History     Past Medical History:  Past Medical History:   Diagnosis Date    Asthma 5/5/2014    possible asthma r/o cardiac etiology h/o chest trauma as a child     Hypertension     Other specified cardiac dysrhythmias(427.89) 5/5/2014    marked sinus bradycardia     Palpitations 5/5/2014    r/o arrythmia     Shortness of breath 5/5/2014    possible asthma r/o cardiac etiology h/o chest trauma as a child        Past Surgical History:  Past Surgical History:   Procedure Laterality Date    HX ADENOIDECTOMY         Family History:  Family History   Problem Relation Age of Onset    Diabetes Mother     Hypertension Mother     Heart Attack Father 48    Hypertension Sister     Cancer Maternal Aunt     Stroke Maternal Grandmother     Stroke Maternal Grandfather     Heart Surgery Neg Hx        Social History:  Social History     Tobacco Use    Smoking status: Never Smoker    Smokeless tobacco: Never Used   Substance Use Topics    Alcohol use: No     Alcohol/week: 0.0 standard drinks    Drug use: No       Allergies: Allergies   Allergen Reactions    Penicillins Unable to Obtain       Review of Systems   Review of Systems   Cardiovascular: Positive for chest pain. All other systems reviewed and are negative.        Physical Exam     Patient Vitals for the past 12 hrs:   Temp Pulse Resp BP SpO2   01/25/20 1114 98.3 °F (36.8 °C) 99 18 104/80 98 %       Physical Exam  Vitals signs and nursing note reviewed. Constitutional:       Appearance: Normal appearance. HENT:      Head: Normocephalic and atraumatic. Nose: Nose normal.      Mouth/Throat:      Mouth: Mucous membranes are moist.   Eyes:      Extraocular Movements: Extraocular movements intact. Neck:      Musculoskeletal: Normal range of motion and neck supple. Cardiovascular:      Rate and Rhythm: Normal rate. Pulmonary:      Effort: Pulmonary effort is normal. No respiratory distress. Breath sounds: Normal breath sounds. Musculoskeletal: Normal range of motion. Skin:     General: Skin is warm and dry. Neurological:      General: No focal deficit present. Mental Status: He is alert and oriented to person, place, and time.    Psychiatric:         Mood and Affect: Mood normal.         Behavior: Behavior normal.         Diagnostic Study Results     Labs -  Recent Results (from the past 12 hour(s))   EKG, 12 LEAD, INITIAL    Collection Time: 01/25/20 11:10 AM   Result Value Ref Range    Ventricular Rate 96 BPM    Atrial Rate 96 BPM    P-R Interval 174 ms    QRS Duration 84 ms    Q-T Interval 346 ms    QTC Calculation (Bezet) 437 ms    Calculated P Axis 30 degrees    Calculated R Axis 61 degrees    Calculated T Axis 26 degrees    Diagnosis       Normal sinus rhythm  Normal ECG  When compared with ECG of 05-AUG-2010 23:32,  Previous ECG has undetermined rhythm, needs review  QT has lengthened     CBC WITH AUTOMATED DIFF    Collection Time: 01/25/20 12:06 PM   Result Value Ref Range    WBC 9.5 4.6 - 13.2 K/uL    RBC 5.01 4.70 - 5.50 M/uL    HGB 14.6 13.0 - 16.0 g/dL    HCT 42.6 36.0 - 48.0 %    MCV 85.0 74.0 - 97.0 FL    MCH 29.1 24.0 - 34.0 PG    MCHC 34.3 31.0 - 37.0 g/dL    RDW 13.9 11.6 - 14.5 %    PLATELET 184 606 - 965 K/uL    MPV 10.7 9.2 - 11.8 FL NEUTROPHILS 60 40 - 73 %    LYMPHOCYTES 28 21 - 52 %    MONOCYTES 11 (H) 3 - 10 %    EOSINOPHILS 1 0 - 5 %    BASOPHILS 0 0 - 2 %    ABS. NEUTROPHILS 5.7 1.8 - 8.0 K/UL    ABS. LYMPHOCYTES 2.6 0.9 - 3.6 K/UL    ABS. MONOCYTES 1.1 0.05 - 1.2 K/UL    ABS. EOSINOPHILS 0.1 0.0 - 0.4 K/UL    ABS. BASOPHILS 0.0 0.0 - 0.1 K/UL    DF AUTOMATED         Radiologic Studies -   No results found. Medical Decision Making     ED Course: Progress Notes, Reevaluation, and Consults:    12:40 PM Initial assessment performed. The patients presenting problems have been discussed, and they/their family are in agreement with the care plan formulated and outlined with them. I have encouraged them to ask questions as they arise throughout their visit. Provider Notes (Medical Decision Making): 79-year-old male presenting with atypical pain for a month. He has history of hypertension and obesity so we will get screening labs to evaluate for cardiac etiology although less likely. The pain is been constant for a week so one set of cardiac enzymes to be sufficient. Chest x-ray to evaluate for infectious processes. No concerns for other etiologies like dissection or PE. Procedures:     Critical Care Time:     Vital Signs-Reviewed the patient's vital signs. Reviewed pt's pulse ox reading. EKG: Interpreted by the EP. Time Interpreted:    Rate:    Rhythm:    Interpretation:   Comparison:     Records Reviewed: Nursing Notes (Time of Review: 12:40 PM)  -I am the first provider for this patient.  -I reviewed the vital signs, available nursing notes, past medical history, past surgical history, family history and social history. Current Outpatient Medications   Medication Sig Dispense Refill    carvedilol (COREG) 6.25 mg tablet Take 1 Tab by mouth two (2) times daily (with meals). Indications: high blood pressure 180 Tab 6    lisinopril (PRINIVIL, ZESTRIL) 2.5 mg tablet Take 1 Tab by mouth daily.  Indications: high blood pressure 90 Tab 6    methocarbamol (ROBAXIN) 500 mg tablet TAKE 1 TABLET BY MOUTH 3 TIMES A DAY 20 Tab 0    gemfibrozil (LOPID) 600 mg tablet Take 1 Tab by mouth two (2) times a day. 60 Tab 5    guaiFENesin ER (MUCINEX) 600 mg ER tablet Take 1 Tab by mouth two (2) times a day. 20 Tab 1    fluticasone propionate (FLONASE) 50 mcg/actuation nasal spray One squirt each nostril BID 1 Bottle 4    tiotropium bromide (SPIRIVA RESPIMAT) 1.25 mcg/actuation inhaler Take 2 Puffs by inhalation daily. 1 Inhaler 11    albuterol (VENTOLIN HFA) 90 mcg/actuation inhaler Take 2 Puffs by inhalation every six (6) hours as needed. 1 Inhaler 5    ketoconazole (NIZORAL) 2 % shampoo Apply as shampoo 3 times per week. 120 mL 11    naproxen (NAPROSYN) 500 mg tablet Take 1 Tab by mouth two (2) times daily (with meals). Indications: Pain 60 Tab 11    triamcinolone acetonide (KENALOG) 0.1 % topical cream Apply  to affected area two (2) times daily as needed for Skin Irritation. 15 g 11        Clinical Impression     Clinical Impression: No diagnosis found. Disposition: DC      This note was dictated utilizing voice recognition software which may lead to typographical errors. I apologize in advance if the situation occurs. If questions arise please do not hesitate to contact me or call our department.     Lorin Sevilla MD  12:40 PM

## 2020-01-25 NOTE — ED NOTES
Chest pain x 1 week. No fever/chills, cough. I performed a brief evaluation, including history and physical, of the patient here in triage and I have determined that pt will need further treatment and evaluation from the main side ER physician. I have placed initial orders to help in expediting patients care.      January 25, 2020 at 11:55 AM - LYNNE Moore        Visit Vitals  /80 (BP 1 Location: Left arm, BP Patient Position: Sitting)   Pulse 99   Temp 98.3 °F (36.8 °C)   Resp 18   SpO2 98%

## 2020-01-25 NOTE — DISCHARGE INSTRUCTIONS

## 2020-01-26 LAB
ATRIAL RATE: 96 BPM
CALCULATED P AXIS, ECG09: 30 DEGREES
CALCULATED R AXIS, ECG10: 61 DEGREES
CALCULATED T AXIS, ECG11: 26 DEGREES
DIAGNOSIS, 93000: NORMAL
P-R INTERVAL, ECG05: 174 MS
Q-T INTERVAL, ECG07: 346 MS
QRS DURATION, ECG06: 84 MS
QTC CALCULATION (BEZET), ECG08: 437 MS
VENTRICULAR RATE, ECG03: 96 BPM

## 2020-02-25 ENCOUNTER — OFFICE VISIT (OUTPATIENT)
Dept: ORTHOPEDIC SURGERY | Facility: CLINIC | Age: 25
End: 2020-02-25

## 2020-02-25 VITALS
WEIGHT: 315 LBS | HEART RATE: 101 BPM | OXYGEN SATURATION: 98 % | TEMPERATURE: 98.6 F | SYSTOLIC BLOOD PRESSURE: 130 MMHG | RESPIRATION RATE: 20 BRPM | BODY MASS INDEX: 46.65 KG/M2 | HEIGHT: 69 IN | DIASTOLIC BLOOD PRESSURE: 88 MMHG

## 2020-02-25 DIAGNOSIS — M75.52 CHRONIC BURSITIS OF LEFT SHOULDER: Primary | ICD-10-CM

## 2020-02-25 DIAGNOSIS — S46.912A STRAIN OF LEFT SHOULDER, INITIAL ENCOUNTER: ICD-10-CM

## 2020-02-25 DIAGNOSIS — M25.512 CHRONIC LEFT SHOULDER PAIN: ICD-10-CM

## 2020-02-25 DIAGNOSIS — G89.29 CHRONIC LEFT SHOULDER PAIN: ICD-10-CM

## 2020-02-25 RX ORDER — BETAMETHASONE SODIUM PHOSPHATE AND BETAMETHASONE ACETATE 3; 3 MG/ML; MG/ML
6 INJECTION, SUSPENSION INTRA-ARTICULAR; INTRALESIONAL; INTRAMUSCULAR; SOFT TISSUE ONCE
Qty: 0.5 ML | Refills: 0
Start: 2020-02-25 | End: 2020-02-25
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
## 2020-02-25 NOTE — PROGRESS NOTES
Verbal order given by Dr. Ramone Longoria to draw up 4 mL 0.25% Sensorcaine and 0.5 mL 30 mg/5mL Betamethasone.

## 2020-03-02 ENCOUNTER — OFFICE VISIT (OUTPATIENT)
Dept: FAMILY MEDICINE CLINIC | Facility: CLINIC | Age: 25
End: 2020-03-02

## 2020-03-02 ENCOUNTER — HOSPITAL ENCOUNTER (OUTPATIENT)
Dept: LAB | Age: 25
Discharge: HOME | End: 2020-03-02
Payer: MEDICAID

## 2020-03-02 VITALS
RESPIRATION RATE: 18 BRPM | HEIGHT: 69 IN | SYSTOLIC BLOOD PRESSURE: 114 MMHG | TEMPERATURE: 96.7 F | OXYGEN SATURATION: 95 % | BODY MASS INDEX: 46.65 KG/M2 | DIASTOLIC BLOOD PRESSURE: 70 MMHG | HEART RATE: 87 BPM | WEIGHT: 315 LBS

## 2020-03-02 DIAGNOSIS — E66.01 MORBID OBESITY (HCC): ICD-10-CM

## 2020-03-02 DIAGNOSIS — S16.1XXA STRAIN OF NECK MUSCLE, INITIAL ENCOUNTER: ICD-10-CM

## 2020-03-02 DIAGNOSIS — E78.5 HYPERLIPIDEMIA, UNSPECIFIED HYPERLIPIDEMIA TYPE: ICD-10-CM

## 2020-03-02 DIAGNOSIS — I10 ESSENTIAL HYPERTENSION WITH GOAL BLOOD PRESSURE LESS THAN 140/90: Primary | ICD-10-CM

## 2020-03-02 LAB
ALBUMIN SERPL-MCNC: 3.6 G/DL (ref 3.4–5)
ALBUMIN/GLOB SERPL: 0.9 {RATIO} (ref 0.8–1.7)
ALP SERPL-CCNC: 106 U/L (ref 45–117)
ALT SERPL-CCNC: 27 U/L (ref 16–61)
ANION GAP SERPL CALC-SCNC: 4 MMOL/L (ref 3–18)
AST SERPL-CCNC: 20 U/L (ref 10–38)
BASOPHILS # BLD: 0 K/UL (ref 0–0.1)
BASOPHILS NFR BLD: 0 % (ref 0–2)
BILIRUB SERPL-MCNC: 0.6 MG/DL (ref 0.2–1)
BUN SERPL-MCNC: 15 MG/DL (ref 7–18)
BUN/CREAT SERPL: 17 (ref 12–20)
CALCIUM SERPL-MCNC: 8.6 MG/DL (ref 8.5–10.1)
CHLORIDE SERPL-SCNC: 106 MMOL/L (ref 100–111)
CHOLEST SERPL-MCNC: 144 MG/DL
CO2 SERPL-SCNC: 28 MMOL/L (ref 21–32)
CREAT SERPL-MCNC: 0.86 MG/DL (ref 0.6–1.3)
DIFFERENTIAL METHOD BLD: ABNORMAL
EOSINOPHIL # BLD: 0.1 K/UL (ref 0–0.4)
EOSINOPHIL NFR BLD: 1 % (ref 0–5)
ERYTHROCYTE [DISTWIDTH] IN BLOOD BY AUTOMATED COUNT: 14.4 % (ref 11.6–14.5)
GLOBULIN SER CALC-MCNC: 3.9 G/DL (ref 2–4)
GLUCOSE SERPL-MCNC: 83 MG/DL (ref 74–99)
HCT VFR BLD AUTO: 41.9 % (ref 36–48)
HDLC SERPL-MCNC: 29 MG/DL (ref 40–60)
HDLC SERPL: 5 {RATIO} (ref 0–5)
HGB BLD-MCNC: 13.8 G/DL (ref 13–16)
LDLC SERPL CALC-MCNC: 98.2 MG/DL (ref 0–100)
LIPID PROFILE,FLP: ABNORMAL
LYMPHOCYTES # BLD: 3.5 K/UL (ref 0.9–3.6)
LYMPHOCYTES NFR BLD: 32 % (ref 21–52)
MCH RBC QN AUTO: 28.7 PG (ref 24–34)
MCHC RBC AUTO-ENTMCNC: 32.9 G/DL (ref 31–37)
MCV RBC AUTO: 87.1 FL (ref 74–97)
MONOCYTES # BLD: 1 K/UL (ref 0.05–1.2)
MONOCYTES NFR BLD: 9 % (ref 3–10)
NEUTS SEG # BLD: 6.4 K/UL (ref 1.8–8)
NEUTS SEG NFR BLD: 58 % (ref 40–73)
PLATELET # BLD AUTO: 435 K/UL (ref 135–420)
PMV BLD AUTO: 11 FL (ref 9.2–11.8)
POTASSIUM SERPL-SCNC: 4.3 MMOL/L (ref 3.5–5.5)
PROT SERPL-MCNC: 7.5 G/DL (ref 6.4–8.2)
RBC # BLD AUTO: 4.81 M/UL (ref 4.7–5.5)
SODIUM SERPL-SCNC: 138 MMOL/L (ref 136–145)
TRIGL SERPL-MCNC: 84 MG/DL (ref ?–150)
VLDLC SERPL CALC-MCNC: 16.8 MG/DL
WBC # BLD AUTO: 10.9 K/UL (ref 4.6–13.2)

## 2020-03-02 PROCEDURE — 85025 COMPLETE CBC W/AUTO DIFF WBC: CPT

## 2020-03-02 PROCEDURE — 36415 COLL VENOUS BLD VENIPUNCTURE: CPT

## 2020-03-02 PROCEDURE — 80061 LIPID PANEL: CPT

## 2020-03-02 PROCEDURE — 80053 COMPREHEN METABOLIC PANEL: CPT

## 2020-03-02 RX ORDER — CYCLOBENZAPRINE HCL 10 MG
10 TABLET ORAL
Qty: 30 TAB | Refills: 3 | Status: SHIPPED | OUTPATIENT
Start: 2020-03-02 | End: 2020-07-07

## 2020-03-02 RX ORDER — GEMFIBROZIL 600 MG/1
600 TABLET, FILM COATED ORAL 2 TIMES DAILY
Qty: 60 TAB | Refills: 5 | Status: SHIPPED | OUTPATIENT
Start: 2020-03-02 | End: 2020-08-24

## 2020-03-02 NOTE — PATIENT INSTRUCTIONS
High Blood Pressure: Care Instructions Overview It's normal for blood pressure to go up and down throughout the day. But if it stays up, you have high blood pressure. Another name for high blood pressure is hypertension. Despite what a lot of people think, high blood pressure usually doesn't cause headaches or make you feel dizzy or lightheaded. It usually has no symptoms. But it does increase your risk of stroke, heart attack, and other problems. You and your doctor will talk about your risks of these problems based on your blood pressure. Your doctor will give you a goal for your blood pressure. Your goal will be based on your health and your age. Lifestyle changes, such as eating healthy and being active, are always important to help lower blood pressure. You might also take medicine to reach your blood pressure goal. 
Follow-up care is a key part of your treatment and safety. Be sure to make and go to all appointments, and call your doctor if you are having problems. It's also a good idea to know your test results and keep a list of the medicines you take. How can you care for yourself at home? Medical treatment · If you stop taking your medicine, your blood pressure will go back up. You may take one or more types of medicine to lower your blood pressure. Be safe with medicines. Take your medicine exactly as prescribed. Call your doctor if you think you are having a problem with your medicine. · Talk to your doctor before you start taking aspirin every day. Aspirin can help certain people lower their risk of a heart attack or stroke. But taking aspirin isn't right for everyone, because it can cause serious bleeding. · See your doctor regularly. You may need to see the doctor more often at first or until your blood pressure comes down. · If you are taking blood pressure medicine, talk to your doctor before you take decongestants or anti-inflammatory medicine, such as ibuprofen. Some of these medicines can raise blood pressure. · Learn how to check your blood pressure at home. Lifestyle changes · Stay at a healthy weight. This is especially important if you put on weight around the waist. Losing even 10 pounds can help you lower your blood pressure. · If your doctor recommends it, get more exercise. Walking is a good choice. Bit by bit, increase the amount you walk every day. Try for at least 30 minutes on most days of the week. You also may want to swim, bike, or do other activities. · Avoid or limit alcohol. Talk to your doctor about whether you can drink any alcohol. · Try to limit how much sodium you eat to less than 2,300 milligrams (mg) a day. Your doctor may ask you to try to eat less than 1,500 mg a day. · Eat plenty of fruits (such as bananas and oranges), vegetables, legumes, whole grains, and low-fat dairy products. · Lower the amount of saturated fat in your diet. Saturated fat is found in animal products such as milk, cheese, and meat. Limiting these foods may help you lose weight and also lower your risk for heart disease. · Do not smoke. Smoking increases your risk for heart attack and stroke. If you need help quitting, talk to your doctor about stop-smoking programs and medicines. These can increase your chances of quitting for good. When should you call for help? Call 911 anytime you think you may need emergency care. This may mean having symptoms that suggest that your blood pressure is causing a serious heart or blood vessel problem. Your blood pressure may be over 180/120. For example, call 911 if: 
  · You have symptoms of a heart attack. These may include: 
? Chest pain or pressure, or a strange feeling in the chest. 
? Sweating. ? Shortness of breath. ? Nausea or vomiting. ? Pain, pressure, or a strange feeling in the back, neck, jaw, or upper belly or in one or both shoulders or arms. ? Lightheadedness or sudden weakness. ? A fast or irregular heartbeat. · You have symptoms of a stroke. These may include: 
? Sudden numbness, tingling, weakness, or loss of movement in your face, arm, or leg, especially on only one side of your body. ? Sudden vision changes. ? Sudden trouble speaking. ? Sudden confusion or trouble understanding simple statements. ? Sudden problems with walking or balance. ? A sudden, severe headache that is different from past headaches. · You have severe back or belly pain. Do not wait until your blood pressure comes down on its own. Get help right away. Call your doctor now or seek immediate care if: 
  · Your blood pressure is much higher than normal (such as 180/120 or higher), but you don't have symptoms. · You think high blood pressure is causing symptoms, such as: 
? Severe headache. 
? Blurry vision. Watch closely for changes in your health, and be sure to contact your doctor if: 
  · Your blood pressure measures higher than your doctor recommends at least 2 times. That means the top number is higher or the bottom number is higher, or both. · You think you may be having side effects from your blood pressure medicine. Where can you learn more? Go to http://leandra-giovanni.info/. Enter N760 in the search box to learn more about \"High Blood Pressure: Care Instructions. \" Current as of: July 22, 2018 Content Version: 12.1 © 5803-9283 [employer]. Care instructions adapted under license by [employer] (which disclaims liability or warranty for this information). If you have questions about a medical condition or this instruction, always ask your healthcare professional. [place] [address] any warranty or liability for your use of this information. High Cholesterol: Care Instructions Your Care Instructions Cholesterol is a type of fat in your blood.  It is needed for many body functions, such as making new cells. Cholesterol is made by your body. It also comes from food you eat. High cholesterol means that you have too much of the fat in your blood. This raises your risk of a heart attack and stroke. LDL and HDL are part of your total cholesterol. LDL is the \"bad\" cholesterol. High LDL can raise your risk for heart disease, heart attack, and stroke. HDL is the \"good\" cholesterol. It helps clear bad cholesterol from the body. High HDL is linked with a lower risk of heart disease, heart attack, and stroke. Your cholesterol levels help your doctor find out your risk for having a heart attack or stroke. You and your doctor can talk about whether you need to lower your risk and what treatment is best for you. A heart-healthy lifestyle along with medicines can help lower your cholesterol and your risk. The way you choose to lower your risk will depend on how high your risk is for heart attack and stroke. It will also depend on how you feel about taking medicines. Follow-up care is a key part of your treatment and safety. Be sure to make and go to all appointments, and call your doctor if you are having problems. It's also a good idea to know your test results and keep a list of the medicines you take. How can you care for yourself at home? · Eat a variety of foods every day. Good choices include fruits, vegetables, whole grains (like oatmeal), dried beans and peas, nuts and seeds, soy products (like tofu), and fat-free or low-fat dairy products. · Replace butter, margarine, and hydrogenated or partially hydrogenated oils with olive and canola oils. (Canola oil margarine without trans fat is fine.) · Replace red meat with fish, poultry, and soy protein (like tofu). · Limit processed and packaged foods like chips, crackers, and cookies. · Bake, broil, or steam foods. Don't cortez them. · Be physically active.  Get at least 30 minutes of exercise on most days of the week. Walking is a good choice. You also may want to do other activities, such as running, swimming, cycling, or playing tennis or team sports. · Stay at a healthy weight or lose weight by making the changes in eating and physical activity listed above. Losing just a small amount of weight, even 5 to 10 pounds, can reduce your risk for having a heart attack or stroke. · Do not smoke. When should you call for help? Watch closely for changes in your health, and be sure to contact your doctor if: 
  · You need help making lifestyle changes. · You have questions about your medicine. Where can you learn more? Go to http://leandraRogue Sports TVgiovanni.info/. Enter Y182 in the search box to learn more about \"High Cholesterol: Care Instructions. \" Current as of: July 22, 2018 Content Version: 12.1 © 0146-5002 [employer]. Care instructions adapted under license by [employer] (which disclaims liability or warranty for this information). If you have questions about a medical condition or this instruction, always ask your healthcare professional. [address] any warranty or liability for your use of this information. High Cholesterol: Care Instructions Your Care Instructions Cholesterol is a type of fat in your blood. It is needed for many body functions, such as making new cells. Cholesterol is made by your body. It also comes from food you eat. High cholesterol means that you have too much of the fat in your blood. This raises your risk of a heart attack and stroke. LDL and HDL are part of your total cholesterol. LDL is the \"bad\" cholesterol. High LDL can raise your risk for heart disease, heart attack, and stroke. HDL is the \"good\" cholesterol. It helps clear bad cholesterol from the body. High HDL is linked with a lower risk of heart disease, heart attack, and stroke. Your cholesterol levels help your doctor find out your risk for having a heart attack or stroke. You and your doctor can talk about whether you need to lower your risk and what treatment is best for you. A heart-healthy lifestyle along with medicines can help lower your cholesterol and your risk. The way you choose to lower your risk will depend on how high your risk is for heart attack and stroke. It will also depend on how you feel about taking medicines. Follow-up care is a key part of your treatment and safety. Be sure to make and go to all appointments, and call your doctor if you are having problems. It's also a good idea to know your test results and keep a list of the medicines you take. How can you care for yourself at home? · Eat a variety of foods every day. Good choices include fruits, vegetables, whole grains (like oatmeal), dried beans and peas, nuts and seeds, soy products (like tofu), and fat-free or low-fat dairy products. · Replace butter, margarine, and hydrogenated or partially hydrogenated oils with olive and canola oils. (Canola oil margarine without trans fat is fine.) · Replace red meat with fish, poultry, and soy protein (like tofu). · Limit processed and packaged foods like chips, crackers, and cookies. · Bake, broil, or steam foods. Don't cortez them. · Be physically active. Get at least 30 minutes of exercise on most days of the week. Walking is a good choice. You also may want to do other activities, such as running, swimming, cycling, or playing tennis or team sports. · Stay at a healthy weight or lose weight by making the changes in eating and physical activity listed above. Losing just a small amount of weight, even 5 to 10 pounds, can reduce your risk for having a heart attack or stroke. · Do not smoke. When should you call for help? Watch closely for changes in your health, and be sure to contact your doctor if: 
  · You need help making lifestyle changes. · You have questions about your medicine. Where can you learn more? Go to http://leandra-giovanni.info/. Enter Z082 in the search box to learn more about \"High Cholesterol: Care Instructions. \" Current as of: July 22, 2018 Content Version: 12.1 © 6802-6822 [employer]. Care instructions adapted under license by [employer] (which disclaims liability or warranty for this information). If you have questions about a medical condition or this instruction, always ask your healthcare professional. [address] any warranty or liability for your use of this information. Body Mass Index: Care Instructions Your Care Instructions Body mass index (BMI) can help you see if your weight is raising your risk for health problems. It uses a formula to compare how much you weigh with how tall you are. · A BMI lower than 18.5 is considered underweight. · A BMI between 18.5 and 24.9 is considered healthy. · A BMI between 25 and 29.9 is considered overweight. A BMI of 30 or higher is considered obese. If your BMI is in the normal range, it means that you have a lower risk for weight-related health problems. If your BMI is in the overweight or obese range, you may be at increased risk for weight-related health problems, such as high blood pressure, heart disease, stroke, arthritis or joint pain, and diabetes. If your BMI is in the underweight range, you may be at increased risk for health problems such as fatigue, lower protection (immunity) against illness, muscle loss, bone loss, hair loss, and hormone problems. BMI is just one measure of your risk for weight-related health problems. You may be at higher risk for health problems if you are not active, you eat an unhealthy diet, or you drink too much alcohol or use tobacco products. Follow-up care is a key part of your treatment and safety.  Be sure to make and go to all appointments, and call your doctor if you are having problems. It's also a good idea to know your test results and keep a list of the medicines you take. How can you care for yourself at home? · Practice healthy eating habits. This includes eating plenty of fruits, vegetables, whole grains, lean protein, and low-fat dairy. · If your doctor recommends it, get more exercise. Walking is a good choice. Bit by bit, increase the amount you walk every day. Try for at least 30 minutes on most days of the week. · Do not smoke. Smoking can increase your risk for health problems. If you need help quitting, talk to your doctor about stop-smoking programs and medicines. These can increase your chances of quitting for good. · Limit alcohol to 2 drinks a day for men and 1 drink a day for women. Too much alcohol can cause health problems. If you have a BMI higher than 25 · Your doctor may do other tests to check your risk for weight-related health problems. This may include measuring the distance around your waist. A waist measurement of more than 40 inches in men or 35 inches in women can increase the risk of weight-related health problems. · Talk with your doctor about steps you can take to stay healthy or improve your health. You may need to make lifestyle changes to lose weight and stay healthy, such as changing your diet and getting regular exercise. If you have a BMI lower than 18.5 · Your doctor may do other tests to check your risk for health problems. · Talk with your doctor about steps you can take to stay healthy or improve your health. You may need to make lifestyle changes to gain or maintain weight and stay healthy, such as getting more healthy foods in your diet and doing exercises to build muscle. Where can you learn more? Go to http://leandra-giovanni.info/. Enter S176 in the search box to learn more about \"Body Mass Index: Care Instructions. \" 
 Current as of: March 28, 2019 Content Version: 12.2 © 1426-0778 [employer]. Care instructions adapted under license by [employer] (which disclaims liability or warranty for this information). If you have questions about a medical condition or this instruction, always ask your healthcare professional. [address] any warranty or liability for your use of this information. DASH Diet: Care Instructions Your Care Instructions The DASH diet is an eating plan that can help lower your blood pressure. DASH stands for Dietary Approaches to Stop Hypertension. Hypertension is high blood pressure. The DASH diet focuses on eating foods that are high in calcium, potassium, and magnesium. These nutrients can lower blood pressure. The foods that are highest in these nutrients are fruits, vegetables, low-fat dairy products, nuts, seeds, and legumes. But taking calcium, potassium, and magnesium supplements instead of eating foods that are high in those nutrients does not have the same effect. The DASH diet also includes whole grains, fish, and poultry. The DASH diet is one of several lifestyle changes your doctor may recommend to lower your high blood pressure. Your doctor may also want you to decrease the amount of sodium in your diet. Lowering sodium while following the DASH diet can lower blood pressure even further than just the DASH diet alone. Follow-up care is a key part of your treatment and safety. Be sure to make and go to all appointments, and call your doctor if you are having problems. It's also a good idea to know your test results and keep a list of the medicines you take. How can you care for yourself at home? Following the DASH diet · Eat 4 to 5 servings of fruit each day. A serving is 1 medium-sized piece of fruit, ½ cup chopped or canned fruit, 1/4 cup dried fruit, or 4 ounces (½ cup) of fruit juice. Choose fruit more often than fruit juice. · Eat 4 to 5 servings of vegetables each day. A serving is 1 cup of lettuce or raw leafy vegetables, ½ cup of chopped or cooked vegetables, or 4 ounces (½ cup) of vegetable juice. Choose vegetables more often than vegetable juice. · Get 2 to 3 servings of low-fat and fat-free dairy each day. A serving is 8 ounces of milk, 1 cup of yogurt, or 1 ½ ounces of cheese. · Eat 6 to 8 servings of grains each day. A serving is 1 slice of bread, 1 ounce of dry cereal, or ½ cup of cooked rice, pasta, or cooked cereal. Try to choose whole-grain products as much as possible. · Limit lean meat, poultry, and fish to 2 servings each day. A serving is 3 ounces, about the size of a deck of cards. · Eat 4 to 5 servings of nuts, seeds, and legumes (cooked dried beans, lentils, and split peas) each week. A serving is 1/3 cup of nuts, 2 tablespoons of seeds, or ½ cup of cooked beans or peas. · Limit fats and oils to 2 to 3 servings each day. A serving is 1 teaspoon of vegetable oil or 2 tablespoons of salad dressing. · Limit sweets and added sugars to 5 servings or less a week. A serving is 1 tablespoon jelly or jam, ½ cup sorbet, or 1 cup of lemonade. · Eat less than 2,300 milligrams (mg) of sodium a day. If you limit your sodium to 1,500 mg a day, you can lower your blood pressure even more. Tips for success · Start small. Do not try to make dramatic changes to your diet all at once. You might feel that you are missing out on your favorite foods and then be more likely to not follow the plan. Make small changes, and stick with them. Once those changes become habit, add a few more changes. · Try some of the following: ? Make it a goal to eat a fruit or vegetable at every meal and at snacks. This will make it easy to get the recommended amount of fruits and vegetables each day. ? Try yogurt topped with fruit and nuts for a snack or healthy dessert. ? Add lettuce, tomato, cucumber, and onion to sandwiches. ? Combine a ready-made pizza crust with low-fat mozzarella cheese and lots of vegetable toppings. Try using tomatoes, squash, spinach, broccoli, carrots, cauliflower, and onions. ? Have a variety of cut-up vegetables with a low-fat dip as an appetizer instead of chips and dip. ? Sprinkle sunflower seeds or chopped almonds over salads. Or try adding chopped walnuts or almonds to cooked vegetables. ? Try some vegetarian meals using beans and peas. Add garbanzo or kidney beans to salads. Make burritos and tacos with mashed greer beans or black beans. Where can you learn more? Go to http://leandra-giovanni.info/. Enter G571 in the search box to learn more about \"DASH Diet: Care Instructions. \" Current as of: April 9, 2019 Content Version: 12.2 © 3014-2821 [employer]. Care instructions adapted under license by [employer] (which disclaims liability or warranty for this information). If you have questions about a medical condition or this instruction, always ask your healthcare professional. [address] any warranty or liability for your use of this information.

## 2020-03-02 NOTE — PROGRESS NOTES
Chief Complaint   Patient presents with    Follow Up Chronic Condition     [facility] Obesity Room 9    Medication Refill     Methocarbamol and Cyclobenzaprine

## 2020-03-02 NOTE — PROGRESS NOTES
03/02/20  10:09 AM  had concerns including Follow Up Chronic Condition (HTN Obesity Room 9) and Medication Refill (Methocarbamol and Cyclobenzaprine ). HISTORY OF PRESENT ILLNESS   This is a 21 y.o. male with hypertension, hyperlipidemia, obesity, and who presents in follow-up. The patient was seen by us on October 2019. He was seen in the ED January 25, 2020 for chest pain. Right neck pain  This problem has resolved. No further symptoms are present. Hypertension   No problems with the meds. No side effects noted The patient has no headaches, visual changes, chest pain or pressure,dyspnea, orthopnea, abdominal pain, dysuria, weakness, or paresthesias   BP Readings from Last 3 Encounters:   03/02/20 114/70   02/25/20 130/88   01/25/20 104/80       Key CAD CHF Meds             lisinopril (PRINIVIL, ZESTRIL) 2.5 mg tablet (Taking) TAKE 1 TABLET BY MOUTH ONCE DAILY FOR HIGH BLOOD PRESSURE    carvedilol (COREG) 6.25 mg tablet (Taking) Take 1 Tab by mouth two (2) times daily (with meals). Indications: high blood pressure    gemfibrozil (LOPID) 600 mg tablet (Taking) Take 1 Tab by mouth two (2) times a day. Hyperlipidemia  He is tolerating the meds well. He is exercising more The patient denies muscle aches, headache, GI symptoms or difficulty with mentation. Wt Readings from Last 3 Encounters:   03/02/20 (!) 376 lb (170.6 kg)   02/25/20 (!) 375 lb 12.8 oz (170.5 kg)   10/29/19 (!) 370 lb (167.8 kg)     Cardiomyopathy   The patient denies any chest pain pressure or palpitations. No orthopnea is admitted to. He was last seen by cardiologyand this is considered a chronic problem. The problem has been resolved. Associated symptoms include shortness of breath. Pertinent negatives include no chest pain, no abdominal pain and no headaches. His last echocardiogram was from August 2019 his ejection fraction had increased to 55 to 60%. No regional wall motion abnormality was noted.   There was normal right ventricular size and function. He was felt to be improved. Current Outpatient Medications   Medication Sig    gemfibroziL (LOPID) 600 mg tablet Take 1 Tab by mouth two (2) times a day.  cyclobenzaprine (FLEXERIL) 10 mg tablet Take 1 Tab by mouth three (3) times daily as needed for Muscle Spasm(s).  lisinopril (PRINIVIL, ZESTRIL) 2.5 mg tablet TAKE 1 TABLET BY MOUTH ONCE DAILY FOR HIGH BLOOD PRESSURE    carvedilol (COREG) 6.25 mg tablet Take 1 Tab by mouth two (2) times daily (with meals). Indications: high blood pressure    fluticasone propionate (FLONASE) 50 mcg/actuation nasal spray One squirt each nostril BID    tiotropium bromide (SPIRIVA RESPIMAT) 1.25 mcg/actuation inhaler Take 2 Puffs by inhalation daily.  albuterol (VENTOLIN HFA) 90 mcg/actuation inhaler Take 2 Puffs by inhalation every six (6) hours as needed.  ketoconazole (NIZORAL) 2 % shampoo Apply as shampoo 3 times per week.  naproxen (NAPROSYN) 500 mg tablet Take 1 Tab by mouth two (2) times daily (with meals). Indications: Pain    triamcinolone acetonide (KENALOG) 0.1 % topical cream Apply  to affected area two (2) times daily as needed for Skin Irritation.  guaiFENesin ER (MUCINEX) 600 mg ER tablet Take 1 Tab by mouth two (2) times a day. No current facility-administered medications for this visit.         Allergies   Allergen Reactions    Penicillins Unable to Obtain     Active Ambulatory Problems     Diagnosis Date Noted    Palpitations 05/05/2014    Shortness of breath 05/05/2014    Other specified cardiac dysrhythmias(427.89) 05/05/2014    Cardiomyopathy ([facility] [address] 09/10/2015    Essential hypertension with goal blood pressure less than 140/90 07/28/2016    Morbid obesity ([facility] [address] 09/07/2017    Seborrheic dermatitis 02/05/2018    Vitamin D deficiency 02/05/2018    Moderate persistent asthma without complication 17/70/4344    Bilateral low back pain without sciatica 02/05/2018    Hyperlipidemia 05/29/2019 Resolved Ambulatory Problems     Diagnosis Date Noted    No Resolved Ambulatory Problems     Past Medical History:   Diagnosis Date    Asthma 5/5/2014    Hypertension        Review of Systems   Constitutional: Negative for chills and fever. HENT: Negative for congestion, ear discharge, ear pain, nosebleeds and sinus pain. Respiratory: Negative for shortness of breath. Cardiovascular: Negative for chest pain, palpitations and leg swelling. Gastrointestinal: Negative for nausea and vomiting. Genitourinary: Negative for dysuria. Musculoskeletal: Positive for myalgias and neck pain. Skin: Negative for rash. Seborrhea     Neurological: Negative for tingling, focal weakness and headaches. Psychiatric/Behavioral: The patient does not have insomnia. Results for orders placed or performed during the hospital encounter of 01/25/20   CBC WITH AUTOMATED DIFF   Result Value Ref Range    WBC 9.5 4.6 - 13.2 K/uL    RBC 5.01 4.70 - 5.50 M/uL    HGB 14.6 13.0 - 16.0 g/dL    HCT 42.6 36.0 - 48.0 %    MCV 85.0 74.0 - 97.0 FL    MCH 29.1 24.0 - 34.0 PG    MCHC 34.3 31.0 - 37.0 g/dL    RDW 13.9 11.6 - 14.5 %    PLATELET 127 403 - 979 K/uL    MPV 10.7 9.2 - 11.8 FL    NEUTROPHILS 60 40 - 73 %    LYMPHOCYTES 28 21 - 52 %    MONOCYTES 11 (H) 3 - 10 %    EOSINOPHILS 1 0 - 5 %    BASOPHILS 0 0 - 2 %    ABS. NEUTROPHILS 5.7 1.8 - 8.0 K/UL    ABS. LYMPHOCYTES 2.6 0.9 - 3.6 K/UL    ABS. MONOCYTES 1.1 0.05 - 1.2 K/UL    ABS. EOSINOPHILS 0.1 0.0 - 0.4 K/UL    ABS.  BASOPHILS 0.0 0.0 - 0.1 K/UL    DF AUTOMATED     METABOLIC PANEL, BASIC   Result Value Ref Range    Sodium 138 136 - 145 mmol/L    Potassium 4.0 3.5 - 5.5 mmol/L    Chloride 106 100 - 111 mmol/L    CO2 28 21 - 32 mmol/L    Anion gap 4 3.0 - 18 mmol/L    Glucose 81 74 - 99 mg/dL    BUN 18 7.0 - 18 MG/DL    Creatinine 0.87 0.6 - 1.3 MG/DL    BUN/Creatinine ratio 21 (H) 12 - 20      GFR est AA >60 >60 ml/min/1.73m2    GFR est non-AA >60 >60 ml/min/1.73m2    Calcium 9.2 8.5 - 10.1 MG/DL   CARDIAC PANEL,(CK, CKMB & TROPONIN)   Result Value Ref Range    CK 82 39 - 308 U/L    CK - MB <1.0 <3.6 ng/ml    CK-MB Index  0.0 - 4.0 %     CALCULATION NOT PERFORMED WHEN RESULT IS BELOW LINEAR LIMIT    Troponin-I, QT <0.02 0.0 - 0.045 NG/ML   EKG, 12 LEAD, INITIAL   Result Value Ref Range    Ventricular Rate 96 BPM    Atrial Rate 96 BPM    P-R Interval 174 ms    QRS Duration 84 ms    Q-T Interval 346 ms    QTC Calculation (Bezet) 437 ms    Calculated P Axis 30 degrees    Calculated R Axis 61 degrees    Calculated T Axis 26 degrees    Diagnosis       Normal sinus rhythm  Normal ECG  When compared with ECG of 05-AUG-2010 23:32,  Previous ECG has undetermined rhythm, needs review  QT has lengthened  Confirmed by Mee Cruz ([id number]) on 1/26/2020 2:30:16 PM         Visit Vitals  /70 (BP 1 Location: Right arm, BP Patient Position: Sitting)   Pulse 87   Temp 96.7 °F (35.9 °C) (Oral)   Resp 18   Ht 5' 9\" (1.753 m)   Wt (!) 376 lb (170.6 kg)   SpO2 95%   BMI 55.53 kg/m²         Physical Exam  Vitals signs and nursing note reviewed. Constitutional:       General: He is not in acute distress. Appearance: He is well-developed. HENT:      Right Ear: External ear normal.      Left Ear: External ear normal.      Nose: Nose normal.   Eyes:      Pupils: Pupils are equal, round, and reactive to light. Neck:      Musculoskeletal: Neck supple. Thyroid: No thyromegaly. Comments: Carotid bruit absent  Cardiovascular:      Rate and Rhythm: Normal rate and regular rhythm. Heart sounds: No murmur. No friction rub. No gallop. Pulmonary:      Effort: Pulmonary effort is normal. No respiratory distress. Breath sounds: Normal breath sounds. Abdominal:      General: There is no distension. Palpations: There is no mass. Tenderness: There is no abdominal tenderness.    Musculoskeletal:         General: No tenderness (Right side of the neck aggravated by rotation of the neck to the right and right lateral extension). Lymphadenopathy:      Cervical: No cervical adenopathy. Skin:     General: Skin is warm and dry. Neurological:      Mental Status: He is alert and oriented to person, place, and time. Psychiatric:      Comments: Nice gentleman, likes to tell jokes       ASSESSMENT and PLAN    ICD-10-CM ICD-9-CM    1. Essential hypertension with goal blood pressure less than 140/90 I10 401.9     This is a chronic problem. It is presently well controlled. We will continue the same treatment. 2. Hyperlipidemia, unspecified hyperlipidemia type E78.5 272.4 gemfibroziL (LOPID) 600 mg tablet      CBC WITH AUTOMATED DIFF      LIPID PANEL      METABOLIC PANEL, COMPREHENSIVE    Continue present treatment recheck before next visit   3. Morbid obesity (HCC) E66.01 278.01     Creeping up slightly. Discussed weight diet and exercise. Consider follow up with dietician, consider meds. 4. Strain of neck muscle, initial encounter S16. 1XXA 847.0 cyclobenzaprine (FLEXERIL) 10 mg tablet    Improved but still using the Flexeril at at bedtime.   Continue present therapy         reviewed diet, exercise and weight control

## 2020-03-27 DIAGNOSIS — M54.50 CHRONIC BILATERAL LOW BACK PAIN WITHOUT SCIATICA: ICD-10-CM

## 2020-03-27 DIAGNOSIS — G89.29 CHRONIC BILATERAL LOW BACK PAIN WITHOUT SCIATICA: ICD-10-CM

## 2020-03-27 NOTE — TELEPHONE ENCOUNTER
Requested Prescriptions     Pending Prescriptions Disp Refills    naproxen (NAPROSYN) 500 mg tablet 60 Tab 11     Sig: Take 1 Tab by mouth two (2) times daily (with meals).  Indications: pain

## 2020-03-28 RX ORDER — NAPROXEN 500 MG/1
500 TABLET ORAL 2 TIMES DAILY WITH MEALS
Qty: 60 TAB | Refills: 11 | Status: SHIPPED | OUTPATIENT
Start: 2020-03-28

## 2020-05-12 NOTE — PROGRESS NOTES
Consent: George Uribe, who was seen by synchronous (real-time) audio-video technology, and/or his healthcare decision maker, is aware that this patient-initiated, Telehealth encounter on 5/13/2020 is a billable service, with coverage as determined by his insurance carrier. He is aware that he may receive a bill and has provided verbal consent to proceed: Yes. The patient was at his residence and I was at the offices of [place] [id number] no one else participated in the service. The primary encounter diagnosis was Essential hypertension with goal blood pressure less than 140/90. Diagnoses of Hyperlipidemia, unspecified hyperlipidemia type, Morbid obesity ([address] Cardiomyopathy, unspecified type ([address] Chronic bilateral low back pain without sciatica, and Bilateral low back pain without sciatica, unspecified chronicity were also pertinent to this visit. ASSESSMENT and PLAN    ICD-10-CM ICD-9-CM    1. Essential hypertension with goal blood pressure less than 140/90 I10 401.9     This is a chronic problem. It is presently well controlled. We will continue the same treatment. 2. Hyperlipidemia, unspecified hyperlipidemia type E78.5 272.4     This is a chronic problem. It is presently well controlled. We will continue the same treatment. Last eval was in March. 3. Acute pharyngitis, unspecified etiology J02.9 462 lidocaine (Lidocaine Viscous) 2 % solution    No signs of exudate. Good eval using flashlight over video. Will call in viscous lidocaine   4. Morbid obesity ([address] E66.01 278.01     Chronic problem, he states that his weight is going down since he started yardwork. Follow. May be bariatric candidate if not improving. 5. Chronic bilateral low back pain without sciatica M54.5 724.2     G89.29 338.29     Associated neck and back pain. Most likely exertional.  He will continue Aleve. He will call if not improving.      Follow-up and Dispositions    · Return in about 4 months (around 9/13/2020) for Follow up on illness. reviewed diet, exercise and weight control  Health Maintenance Due   Topic Date Due    Pneumococcal 0-64 years (1 of 1 - PPSV23) 04/04/2000    DTaP/Tdap/Td series (2 - Td) 01/01/2015         I spent at least 15 minutes with this established patient, and >50% of the time was spent counseling and/or coordinating care regarding How to treat pharyngitis, essential hypertension, diet and exercise, treatment of neck shoulder thigh and back pain. 712  Subjective:   Serene Landa is a 31 y.o. male who was seen for follow-up. Muscular aches  Neck pain admitted to leg and shoulders. The onsent at the same time as he is starting doing yard work. This has been present a 2 days and tried Aleve and Tylenol with fairly good relief. He wants to stay with these medications before we try to increase it to perhaps Mobic. Sore throat  He states he has a sore throat. This is also been present for 2 days. No fever chills is admitted to. No tonsillar exudate as far as he knows. Adenoids removed , tonsils present he is unaware of any swollen glands. He is not been exposed to anyone except for those in the house. He lives with his sister and mother    Hypertension  Blood pressure has been good at home. The patient has had no problem with the medication. The patient has no headaches, visual changes, chest pain or pressure,dyspnea, orthopnea, abdominal pain, dysuria, weakness, or paresthesias.   BP Readings from Last 3 Encounters:   03/02/20 114/70   02/25/20 130/88   01/25/20 104/80     Lab Results   Component Value Date/Time    Sodium 138 03/02/2020 03:43 PM    Potassium 4.3 03/02/2020 03:43 PM    Chloride 106 03/02/2020 03:43 PM    CO2 28 03/02/2020 03:43 PM    Anion gap 4 03/02/2020 03:43 PM    Glucose 83 03/02/2020 03:43 PM    BUN 15 03/02/2020 03:43 PM    Creatinine 0.86 03/02/2020 03:43 PM    BUN/Creatinine ratio 17 03/02/2020 03:43 PM    GFR est AA >60 03/02/2020 03:43 PM    GFR est non-AA >60 03/02/2020 03:43 PM    Calcium 8.6 03/02/2020 03:43 PM     EKG Results     None          Key CAD CHF Meds             gemfibroziL (LOPID) 600 mg tablet Take 1 Tab by mouth two (2) times a day. lisinopril (PRINIVIL, ZESTRIL) 2.5 mg tablet TAKE 1 TABLET BY MOUTH ONCE DAILY FOR HIGH BLOOD PRESSURE    carvedilol (COREG) 6.25 mg tablet Take 1 Tab by mouth two (2) times daily (with meals). Indications: high blood pressure                Overweight    body mass index is unknown because there is no height or weight on file. Wt Readings from Last 3 Encounters:   03/02/20 (!) 376 lb (170.6 kg)   02/25/20 (!) 375 lb 12.8 oz (170.5 kg)   10/29/19 (!) 370 lb (167.8 kg)     Key Obesity Meds     Patient is on no anti-obesity meds. He has a history of essential hypertension and hyperlipidemia as well as morbid obesity. There is a history of cardiomyopathy. An echocardiogram was done in August 2019, showing normal left ventricular cavity size moderate concentric hypertrophy and an ejection fraction of 56 to 60% with no regional wall motion abnormality noted the right ventricle was normal in size and function and no hemodynamically significant valvular pathology was noted. Prior to Admission medications    Medication Sig Start Date End Date Taking? Authorizing Provider   naproxen (NAPROSYN) 500 mg tablet Take 1 Tab by mouth two (2) times daily (with meals). Indications: pain 3/28/20   Zaira Wick MD   gemfibroziL (LOPID) 600 mg tablet Take 1 Tab by mouth two (2) times a day. 3/2/20   Zaira Wick MD   cyclobenzaprine (FLEXERIL) 10 mg tablet Take 1 Tab by mouth three (3) times daily as needed for Muscle Spasm(s). 3/2/20   Zaira Wick MD   lisinopril (PRINIVIL, ZESTRIL) 2.5 mg tablet TAKE 1 TABLET BY MOUTH ONCE DAILY FOR HIGH BLOOD PRESSURE 1/31/20   Zaira Wick MD   carvedilol (COREG) 6.25 mg tablet Take 1 Tab by mouth two (2) times daily (with meals). Indications: high blood pressure 10/29/19   Henry Underwood MD   guaiFENesin ER (MUCINEX) 600 mg ER tablet Take 1 Tab by mouth two (2) times a day. 4/3/19   Nicole Watson MD   fluticasone propionate (FLONASE) 50 mcg/actuation nasal spray One squirt each nostril BID 4/3/19   Nicole Watson MD   tiotropium bromide (SPIRIVA RESPIMAT) 1.25 mcg/actuation inhaler Take 2 Puffs by inhalation daily. 2/13/19   Nataly Lau MD   albuterol (VENTOLIN HFA) 90 mcg/actuation inhaler Take 2 Puffs by inhalation every six (6) hours as needed. 2/13/19   Nataly Lau MD   ketoconazole (NIZORAL) 2 % shampoo Apply as shampoo 3 times per week. 2/13/19   Nataly Lau MD   triamcinolone acetonide (KENALOG) 0.1 % topical cream Apply  to affected area two (2) times daily as needed for Skin Irritation. 2/5/18   Nataly Lau MD     Allergies   Allergen Reactions    Penicillins Unable to Obtain     has Palpitations, Shortness of breath, Other specified cardiac dysrhythmias(427.89), Cardiomyopathy ([address] Essential hypertension with goal blood pressure less than 140/90, Morbid obesity ([address] Seborrheic dermatitis, Vitamin D deficiency, Moderate persistent asthma without complication, Bilateral low back pain without sciatica, and Hyperlipidemia on their problem list.  Past Surgical History:   Procedure Laterality Date    HX ADENOIDECTOMY         Review of Systems   Constitutional: Negative for chills and fever. HENT: Positive for sore throat. Negative for congestion. Respiratory: Negative for shortness of breath and wheezing. Musculoskeletal: Positive for back pain, joint pain, myalgias and neck pain. Negative for falls. Neurological: Negative for headaches. Psychiatric/Behavioral: The patient is not nervous/anxious.           Objective:   MV=013/80 P 80    BMI Readings from Last 3 Encounters:   03/02/20 55.53 kg/m²   02/25/20 55.50 kg/m²   10/29/19 54.64 kg/m²       General: alert, cooperative, no distress Mental  status: normal mood, behavior, speech, dress, motor activity, and thought processes, able to follow commands   HENT: NCAT. Using a flashlight that he had which was held by his mother, I got a good view of the posterior pharynx and tonsils. There is no exudate   Neck: no visualized mass   Musculoskeletal:  Standing from a seated position yielded some thigh pain and low back stiffness. Pain on the bilateral neck on forward flexion. Resp: no respiratory distress   Neuro: no gross deficits   Skin: no discoloration or lesions of concern on visible areas   Psychiatric: normal affect, consistent with stated mood, no evidence of hallucinations       Results for orders placed or performed during the hospital encounter of 03/02/20   CBC WITH AUTOMATED DIFF   Result Value Ref Range    WBC 10.9 4.6 - 13.2 K/uL    RBC 4.81 4.70 - 5.50 M/uL    HGB 13.8 13.0 - 16.0 g/dL    HCT 41.9 36.0 - 48.0 %    MCV 87.1 74.0 - 97.0 FL    MCH 28.7 24.0 - 34.0 PG    MCHC 32.9 31.0 - 37.0 g/dL    RDW 14.4 11.6 - 14.5 %    PLATELET 837 (H) 867 - 420 K/uL    MPV 11.0 9.2 - 11.8 FL    NEUTROPHILS 58 40 - 73 %    LYMPHOCYTES 32 21 - 52 %    MONOCYTES 9 3 - 10 %    EOSINOPHILS 1 0 - 5 %    BASOPHILS 0 0 - 2 %    ABS. NEUTROPHILS 6.4 1.8 - 8.0 K/UL    ABS. LYMPHOCYTES 3.5 0.9 - 3.6 K/UL    ABS. MONOCYTES 1.0 0.05 - 1.2 K/UL    ABS. EOSINOPHILS 0.1 0.0 - 0.4 K/UL    ABS.  BASOPHILS 0.0 0.0 - 0.1 K/UL    DF AUTOMATED     LIPID PANEL   Result Value Ref Range    LIPID PROFILE          Cholesterol, total 144 <200 MG/DL    Triglyceride 84 <150 MG/DL    HDL Cholesterol 29 (L) 40 - 60 MG/DL    LDL, calculated 98.2 0 - 100 MG/DL    VLDL, calculated 16.8 MG/DL    CHOL/HDL Ratio 5.0 0 - 5.0     METABOLIC PANEL, COMPREHENSIVE   Result Value Ref Range    Sodium 138 136 - 145 mmol/L    Potassium 4.3 3.5 - 5.5 mmol/L    Chloride 106 100 - 111 mmol/L    CO2 28 21 - 32 mmol/L    Anion gap 4 3.0 - 18 mmol/L    Glucose 83 74 - 99 mg/dL    BUN 15 7.0 - 18 MG/DL    Creatinine 0.86 0.6 - 1.3 MG/DL    BUN/Creatinine ratio 17 12 - 20      GFR est AA >60 >60 ml/min/1.73m2    GFR est non-AA >60 >60 ml/min/1.73m2    Calcium 8.6 8.5 - 10.1 MG/DL    Bilirubin, total 0.6 0.2 - 1.0 MG/DL    ALT (SGPT) 27 16 - 61 U/L    AST (SGOT) 20 10 - 38 U/L    Alk. phosphatase 106 45 - 117 U/L    Protein, total 7.5 6.4 - 8.2 g/dL    Albumin 3.6 3.4 - 5.0 g/dL    Globulin 3.9 2.0 - 4.0 g/dL    A-G Ratio 0.9 0.8 - 1.7           We discussed the expected course, resolution and complications of the diagnosis(es) in detail. Medication risks, benefits, costs, interactions, and alternatives were discussed as indicated. I advised him to contact the office if his condition worsens, changes or fails to improve as anticipated. He expressed understanding with the diagnosis(es) and plan. Jey Carmona is a 31 y.o. male being evaluated by a video visit encounter for concerns as above. A caregiver was present when appropriate. Due to this being a TeleHealth encounter (During [facility]- public health emergency), evaluation of the following organ systems was limited: Vitals/Constitutional/EENT/Resp/CV/GI//MS/Neuro/Skin/Heme-Lymph-Imm. Pursuant to the emergency declaration under the [facility], [facility] waiver authority and the [employer] and [employer] Act, this Virtual  Visit was conducted, with patient's (and/or legal guardian's) consent, to reduce the patient's risk of exposure to COVID-19 and provide necessary medical care. Honey Luis MD      This note was done with the assistance of dragon speech software.   Some inadvertent errors or omissions may be present

## 2020-05-13 ENCOUNTER — VIRTUAL VISIT (OUTPATIENT)
Dept: FAMILY MEDICINE CLINIC | Facility: CLINIC | Age: 25
End: 2020-05-13

## 2020-05-13 DIAGNOSIS — M54.50 CHRONIC BILATERAL LOW BACK PAIN WITHOUT SCIATICA: ICD-10-CM

## 2020-05-13 DIAGNOSIS — G89.29 CHRONIC BILATERAL LOW BACK PAIN WITHOUT SCIATICA: ICD-10-CM

## 2020-05-13 DIAGNOSIS — I10 ESSENTIAL HYPERTENSION WITH GOAL BLOOD PRESSURE LESS THAN 140/90: Primary | ICD-10-CM

## 2020-05-13 DIAGNOSIS — E78.5 HYPERLIPIDEMIA, UNSPECIFIED HYPERLIPIDEMIA TYPE: ICD-10-CM

## 2020-05-13 DIAGNOSIS — J02.9 ACUTE PHARYNGITIS, UNSPECIFIED ETIOLOGY: ICD-10-CM

## 2020-05-13 DIAGNOSIS — E66.01 MORBID OBESITY (HCC): ICD-10-CM

## 2020-05-13 RX ORDER — LIDOCAINE HYDROCHLORIDE 20 MG/ML
SOLUTION OROPHARYNGEAL
Qty: 200 ML | Refills: 0 | Status: SHIPPED | OUTPATIENT
Start: 2020-05-13

## 2020-05-21 ENCOUNTER — TELEPHONE (OUTPATIENT)
Dept: FAMILY MEDICINE CLINIC | Facility: CLINIC | Age: 25
End: 2020-05-21

## 2020-05-21 NOTE — TELEPHONE ENCOUNTER
I received a call and spoke with pt mother Ann Saravia and she was made aware that per Dr. Eunice Espinoza pt can take Aleve or Motrin for pain no Hydrocodone will be given for pain. Ms. Persaud Hearing was made aware.

## 2020-06-23 ENCOUNTER — OFFICE VISIT (OUTPATIENT)
Dept: ORTHOPEDIC SURGERY | Facility: CLINIC | Age: 25
End: 2020-06-23

## 2020-06-23 VITALS
WEIGHT: 315 LBS | HEIGHT: 69 IN | HEART RATE: 82 BPM | OXYGEN SATURATION: 100 % | BODY MASS INDEX: 46.65 KG/M2 | TEMPERATURE: 97.3 F | RESPIRATION RATE: 16 BRPM

## 2020-06-23 DIAGNOSIS — M17.12 PRIMARY OSTEOARTHRITIS OF LEFT KNEE: ICD-10-CM

## 2020-06-23 DIAGNOSIS — M25.562 ACUTE PAIN OF LEFT KNEE: Primary | ICD-10-CM

## 2020-06-23 RX ORDER — BETAMETHASONE SODIUM PHOSPHATE AND BETAMETHASONE ACETATE 3; 3 MG/ML; MG/ML
6 INJECTION, SUSPENSION INTRA-ARTICULAR; INTRALESIONAL; INTRAMUSCULAR; SOFT TISSUE ONCE
Qty: 1 ML | Refills: 0
Start: 2020-06-23 | End: 2020-06-23

## 2020-06-23 NOTE — PROGRESS NOTES
Verbal order given by Nae Richard PA-C  to draw up 3 mL 1% lidocaine and 1 mL 30 mg/5mL Betamethasone.
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
## 2020-07-04 DIAGNOSIS — S16.1XXA STRAIN OF NECK MUSCLE, INITIAL ENCOUNTER: ICD-10-CM

## 2020-07-07 RX ORDER — CYCLOBENZAPRINE HCL 10 MG
TABLET ORAL
Qty: 30 TAB | Refills: 3 | Status: SHIPPED | OUTPATIENT
Start: 2020-07-07 | End: 2022-09-12

## 2020-08-21 ENCOUNTER — VIRTUAL VISIT (OUTPATIENT)
Dept: CARDIOLOGY CLINIC | Age: 25
End: 2020-08-21

## 2020-08-21 DIAGNOSIS — E66.01 MORBID OBESITY (HCC): ICD-10-CM

## 2020-08-21 DIAGNOSIS — E78.5 HYPERLIPIDEMIA, UNSPECIFIED HYPERLIPIDEMIA TYPE: ICD-10-CM

## 2020-08-21 DIAGNOSIS — I10 ESSENTIAL HYPERTENSION WITH GOAL BLOOD PRESSURE LESS THAN 140/90: ICD-10-CM

## 2020-08-21 DIAGNOSIS — I42.9 CARDIOMYOPATHY, UNSPECIFIED TYPE (HCC): Primary | ICD-10-CM

## 2020-08-21 NOTE — PROGRESS NOTES
Unable to obtain vital signs. 1. Have you been to the ER, urgent care clinic since your last visit? Hospitalized since your last visit? No    2. Have you seen or consulted any other health care providers outside of the [address] [place] [address] since your last visit? Include any pap smears or colon screening.  No

## 2020-08-21 NOTE — PROGRESS NOTES
Consent: Jorge Carpenter, who was seen by synchronous (real-time) audio-video technology, and/or his healthcare decision maker, is aware that this patient-initiated, Telehealth encounter on 8/21/2020 is a billable service, with coverage as determined by his insurance carrier. He is aware that he may receive a bill and has provided verbal consent to proceed: Yes. Subjective:   Jorge Carpenter is a 31 y.o. male who was seen for Cardiomyopathy (1 Year Follow Up) and Hypertension    8/2020  Patient seen today for virtual visit. On follow up patient denies any chest pains,sob, palpitation or other significant symptoms. Family History   Problem Relation Age of Onset    Diabetes Mother     Hypertension Mother     Heart Attack Father 48    Hypertension Sister     Cancer Maternal Aunt     Stroke Maternal Grandmother     Stroke Maternal Grandfather     Heart Surgery Neg Hx      Past Surgical History:   Procedure Laterality Date    HX ADENOIDECTOMY       Allergies   Allergen Reactions    Penicillins Unable to Obtain       Current Outpatient Medications:     cyclobenzaprine (FLEXERIL) 10 mg tablet, TAKE 1 TABLET BY MOUTH 3 TIMES A DAY AS NEEDED FOR MUSCLE SPASM, Disp: 30 Tab, Rfl: 3    lidocaine (Lidocaine Viscous) 2 % solution, Put 10 mL in mouth and swish and spit out QAC and at bedtime, Disp: 200 mL, Rfl: 0    naproxen (NAPROSYN) 500 mg tablet, Take 1 Tab by mouth two (2) times daily (with meals). Indications: pain, Disp: 60 Tab, Rfl: 11    gemfibroziL (LOPID) 600 mg tablet, Take 1 Tab by mouth two (2) times a day., Disp: 60 Tab, Rfl: 5    lisinopril (PRINIVIL, ZESTRIL) 2.5 mg tablet, TAKE 1 TABLET BY MOUTH ONCE DAILY FOR HIGH BLOOD PRESSURE, Disp: 90 Tab, Rfl: 3    carvedilol (COREG) 6.25 mg tablet, Take 1 Tab by mouth two (2) times daily (with meals).  Indications: high blood pressure, Disp: 180 Tab, Rfl: 6    guaiFENesin ER (MUCINEX) 600 mg ER tablet, Take 1 Tab by mouth two (2) times a day., Disp: 20 Tab, Rfl: 1    fluticasone propionate (FLONASE) 50 mcg/actuation nasal spray, One squirt each nostril BID, Disp: 1 Bottle, Rfl: 4    tiotropium bromide (SPIRIVA RESPIMAT) 1.25 mcg/actuation inhaler, Take 2 Puffs by inhalation daily. , Disp: 1 Inhaler, Rfl: 11    albuterol (VENTOLIN HFA) 90 mcg/actuation inhaler, Take 2 Puffs by inhalation every six (6) hours as needed. , Disp: 1 Inhaler, Rfl: 5    ketoconazole (NIZORAL) 2 % shampoo, Apply as shampoo 3 times per week., Disp: 120 mL, Rfl: 11    triamcinolone acetonide (KENALOG) 0.1 % topical cream, Apply  to affected area two (2) times daily as needed for Skin Irritation. , Disp: 15 g, Rfl: 11  Allergies   Allergen Reactions    Penicillins Unable to Obtain         Review of Systems   Review of Systems   Constitutional: Negative for chills and fever. HENT: Negative for nosebleeds. Eyes: Negative for blurred vision and double vision. Respiratory: See HPI   Cardiovascular: See HPI  Gastrointestinal: Negative for abdominal pain, heartburn, nausea and vomiting. Musculoskeletal: Negative for myalgias. Skin: Negative for rash. Neurological: Negative for dizziness, weakness and headaches. Endo/Heme/Allergies: Does not bruise/bleed easily. Objective: There were no vitals taken for this visit. General: alert, cooperative, no distress   Mental  status: normal mood, behavior, speech, dress, motor activity, and thought processes, able to follow commands   HENT: NCAT   Neck: no visualized mass,No JVD   Resp: no respiratory distress   Neuro: no gross deficits   Skin: no discoloration or Bruise on visible areas   Psychiatric: normal affect, consistent with stated mood, no evidence of hallucinations     Additional exam findings:     Extremities:No edema     Pertinent LAB and reports  I have reviewed available  pertinent notes, labs and reports and included in current evaluation and management of this patient.     Assessment & Plan: Diagnoses and all orders for this visit:    1. Cardiomyopathy, unspecified type ([facility] [address]  Comments:  Improving EF continue treatment monitor    2. Essential hypertension with goal blood pressure less than 140/90  Comments:  Continue current treatment monitor    3. Hyperlipidemia, unspecified hyperlipidemia type  Comments:  Continue treatment follow-up lab with PCP    4. Morbid obesity ([facility] [address]  Comments:  Continue diet and exercise        8/2020  Cardiac status stable continue current medical management  Follow-up and Dispositions    · Return in about 6 months (around 2/21/2021). 712  We discussed the expected course, resolution and complications of the diagnosis(es) in detail. Medication risks, benefits, costs, interactions, and alternatives were discussed as indicated. I advised him to contact the office if his condition worsens, changes or fails to improve as anticipated. He expressed understanding with the diagnosis(es) and plan. Ca Romero is a 31 y.o. male being evaluated by a video visit encounter for concerns as above. A caregiver was present when appropriate. Due to this being a TeleHealth encounter (During XVWUN-73 public health emergency), evaluation of the following organ systems was limited: Vitals/Constitutional/EENT/Resp/CV/GI//MS/Neuro/Skin/Heme-Lymph-Imm. Pursuant to the emergency declaration under the [facility], [facility] waiver authority and the [employer] Act, this Virtual  Visit was conducted, with patient's (and/or legal guardian's) consent, to reduce the patient's risk of exposure to COVID-19 and provide necessary medical care. Services were provided through a video synchronous discussion virtually to substitute for in-person clinic visit. I was in the office while conducting this encounter.         Jagdish Kumari MD

## 2020-08-24 DIAGNOSIS — E78.5 HYPERLIPIDEMIA, UNSPECIFIED HYPERLIPIDEMIA TYPE: ICD-10-CM

## 2020-08-24 RX ORDER — GEMFIBROZIL 600 MG/1
TABLET, FILM COATED ORAL
Qty: 60 TAB | Refills: 5 | Status: SHIPPED | OUTPATIENT
Start: 2020-08-24 | End: 2021-03-04 | Stop reason: SDUPTHER

## 2020-09-09 ENCOUNTER — TELEPHONE (OUTPATIENT)
Dept: FAMILY MEDICINE CLINIC | Facility: CLINIC | Age: 25
End: 2020-09-09

## 2020-09-09 NOTE — TELEPHONE ENCOUNTER
Patient states [employer] will be mailing a form for patient and would like to know when received.

## 2020-09-10 NOTE — TELEPHONE ENCOUNTER
TC was returned to pt and pt verified name and d. o.b pt ws made aware that we have received the paperwork.

## 2020-11-04 DIAGNOSIS — I10 ESSENTIAL HYPERTENSION WITH GOAL BLOOD PRESSURE LESS THAN 140/90: ICD-10-CM

## 2020-11-04 RX ORDER — CARVEDILOL 6.25 MG/1
TABLET ORAL
Qty: 60 TAB | Refills: 6 | Status: SHIPPED | OUTPATIENT
Start: 2020-11-04 | End: 2021-08-02 | Stop reason: SDUPTHER

## 2020-11-24 DIAGNOSIS — I10 ESSENTIAL HYPERTENSION WITH GOAL BLOOD PRESSURE LESS THAN 140/90: ICD-10-CM

## 2020-11-24 DIAGNOSIS — L21.9 SEBORRHEIC DERMATITIS: ICD-10-CM

## 2020-11-24 DIAGNOSIS — J45.40 MODERATE PERSISTENT ASTHMA WITHOUT COMPLICATION: ICD-10-CM

## 2020-11-25 RX ORDER — LISINOPRIL 2.5 MG/1
2.5 TABLET ORAL DAILY
Qty: 90 TAB | Refills: 3 | Status: SHIPPED | OUTPATIENT
Start: 2020-11-25 | End: 2021-02-02 | Stop reason: SDUPTHER

## 2020-11-25 RX ORDER — ALBUTEROL SULFATE 90 UG/1
2 AEROSOL, METERED RESPIRATORY (INHALATION)
Qty: 1 INHALER | Refills: 5 | Status: SHIPPED | OUTPATIENT
Start: 2020-11-25

## 2020-11-25 RX ORDER — KETOCONAZOLE 20 MG/ML
SHAMPOO TOPICAL
Qty: 120 ML | Refills: 11 | Status: SHIPPED | OUTPATIENT
Start: 2020-11-25 | End: 2022-01-05 | Stop reason: SDUPTHER

## 2021-02-02 DIAGNOSIS — I10 ESSENTIAL HYPERTENSION WITH GOAL BLOOD PRESSURE LESS THAN 140/90: ICD-10-CM

## 2021-02-06 RX ORDER — LISINOPRIL 2.5 MG/1
2.5 TABLET ORAL DAILY
Qty: 90 TAB | Refills: 3 | Status: SHIPPED | OUTPATIENT
Start: 2021-02-06 | End: 2022-01-05 | Stop reason: SDUPTHER

## 2021-02-19 ENCOUNTER — OFFICE VISIT (OUTPATIENT)
Dept: CARDIOLOGY CLINIC | Age: 26
End: 2021-02-19
Payer: MEDICAID

## 2021-02-19 VITALS
WEIGHT: 315 LBS | HEIGHT: 69 IN | OXYGEN SATURATION: 97 % | SYSTOLIC BLOOD PRESSURE: 121 MMHG | DIASTOLIC BLOOD PRESSURE: 73 MMHG | BODY MASS INDEX: 46.65 KG/M2 | HEART RATE: 79 BPM | TEMPERATURE: 98.2 F

## 2021-02-19 DIAGNOSIS — I10 ESSENTIAL HYPERTENSION WITH GOAL BLOOD PRESSURE LESS THAN 140/90: ICD-10-CM

## 2021-02-19 DIAGNOSIS — E66.01 MORBID OBESITY (HCC): ICD-10-CM

## 2021-02-19 DIAGNOSIS — E78.5 HYPERLIPIDEMIA, UNSPECIFIED HYPERLIPIDEMIA TYPE: ICD-10-CM

## 2021-02-19 DIAGNOSIS — I42.9 CARDIOMYOPATHY, UNSPECIFIED TYPE (HCC): Primary | ICD-10-CM

## 2021-02-19 PROCEDURE — 99214 OFFICE O/P EST MOD 30 MIN: CPT | Performed by: INTERNAL MEDICINE
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
## 2021-02-19 NOTE — PROGRESS NOTES
1. Have you been to the ER, urgent care clinic since your last visit? Hospitalized since your last visit?     no    2. Have you seen or consulted any other health care providers outside of the [address] [place] [address] since your last visit? Include any pap smears or colon screening. No     3. Do you need any refills today?    no

## 2021-05-14 ENCOUNTER — OFFICE VISIT (OUTPATIENT)
Dept: ORTHOPEDIC SURGERY | Age: 26
End: 2021-05-14
Payer: MEDICAID

## 2021-05-14 VITALS — TEMPERATURE: 98 F | HEIGHT: 69 IN | BODY MASS INDEX: 46.65 KG/M2 | WEIGHT: 315 LBS

## 2021-05-14 DIAGNOSIS — S80.01XA CONTUSION OF RIGHT KNEE, INITIAL ENCOUNTER: ICD-10-CM

## 2021-05-14 DIAGNOSIS — M25.561 ACUTE PAIN OF RIGHT KNEE: Primary | ICD-10-CM

## 2021-05-14 DIAGNOSIS — S89.91XA SOFT TISSUE INJURY OF RIGHT KNEE, INITIAL ENCOUNTER: ICD-10-CM

## 2021-05-14 PROCEDURE — 73564 X-RAY EXAM KNEE 4 OR MORE: CPT | Performed by: ORTHOPAEDIC SURGERY

## 2021-05-14 PROCEDURE — 99214 OFFICE O/P EST MOD 30 MIN: CPT | Performed by: ORTHOPAEDIC SURGERY

## 2021-06-18 ENCOUNTER — HOSPITAL ENCOUNTER (EMERGENCY)
Age: 26
Discharge: HOME | End: 2021-06-18
Attending: EMERGENCY MEDICINE
Payer: MEDICAID

## 2021-06-18 ENCOUNTER — APPOINTMENT (OUTPATIENT)
Dept: GENERAL RADIOLOGY | Age: 26
End: 2021-06-18
Attending: EMERGENCY MEDICINE
Payer: MEDICAID

## 2021-06-18 VITALS
HEART RATE: 90 BPM | SYSTOLIC BLOOD PRESSURE: 116 MMHG | RESPIRATION RATE: 16 BRPM | OXYGEN SATURATION: 100 % | TEMPERATURE: 98 F | DIASTOLIC BLOOD PRESSURE: 58 MMHG

## 2021-06-18 DIAGNOSIS — R07.89 CHEST WALL PAIN: Primary | ICD-10-CM

## 2021-06-18 LAB
ALBUMIN SERPL-MCNC: 3.8 G/DL (ref 3.4–5)
ALBUMIN/GLOB SERPL: 0.8 {RATIO} (ref 0.8–1.7)
ALP SERPL-CCNC: 107 U/L (ref 45–117)
ALT SERPL-CCNC: 106 U/L (ref 16–61)
ANION GAP SERPL CALC-SCNC: 4 MMOL/L (ref 3–18)
AST SERPL-CCNC: 40 U/L (ref 10–38)
BASOPHILS # BLD: 0 K/UL (ref 0–0.1)
BASOPHILS NFR BLD: 0 % (ref 0–2)
BILIRUB SERPL-MCNC: 0.4 MG/DL (ref 0.2–1)
BUN SERPL-MCNC: 17 MG/DL (ref 7–18)
BUN/CREAT SERPL: 17 (ref 12–20)
CALCIUM SERPL-MCNC: 9 MG/DL (ref 8.5–10.1)
CHLORIDE SERPL-SCNC: 108 MMOL/L (ref 100–111)
CK MB CFR SERPL CALC: NORMAL % (ref 0–4)
CK MB SERPL-MCNC: <1 NG/ML (ref 5–25)
CK SERPL-CCNC: 104 U/L (ref 39–308)
CO2 SERPL-SCNC: 27 MMOL/L (ref 21–32)
CREAT SERPL-MCNC: 1.01 MG/DL (ref 0.6–1.3)
DIFFERENTIAL METHOD BLD: ABNORMAL
EOSINOPHIL # BLD: 0 K/UL (ref 0–0.4)
EOSINOPHIL NFR BLD: 0 % (ref 0–5)
ERYTHROCYTE [DISTWIDTH] IN BLOOD BY AUTOMATED COUNT: 13.4 % (ref 11.6–14.5)
GLOBULIN SER CALC-MCNC: 4.7 G/DL (ref 2–4)
GLUCOSE SERPL-MCNC: 111 MG/DL (ref 74–99)
HCT VFR BLD AUTO: 43.3 % (ref 36–48)
HGB BLD-MCNC: 14.6 G/DL (ref 13–16)
LYMPHOCYTES # BLD: 2.9 K/UL (ref 0.9–3.6)
LYMPHOCYTES NFR BLD: 22 % (ref 21–52)
MCH RBC QN AUTO: 28.3 PG (ref 24–34)
MCHC RBC AUTO-ENTMCNC: 33.7 G/DL (ref 31–37)
MCV RBC AUTO: 84.1 FL (ref 74–97)
MONOCYTES # BLD: 0.9 K/UL (ref 0.05–1.2)
MONOCYTES NFR BLD: 7 % (ref 3–10)
NEUTS SEG # BLD: 9.2 K/UL (ref 1.8–8)
NEUTS SEG NFR BLD: 70 % (ref 40–73)
PLATELET # BLD AUTO: 429 K/UL (ref 135–420)
PMV BLD AUTO: 10.7 FL (ref 9.2–11.8)
POTASSIUM SERPL-SCNC: 3.5 MMOL/L (ref 3.5–5.5)
PROT SERPL-MCNC: 8.5 G/DL (ref 6.4–8.2)
RBC # BLD AUTO: 5.15 M/UL (ref 4.35–5.65)
SODIUM SERPL-SCNC: 139 MMOL/L (ref 136–145)
TROPONIN I SERPL-MCNC: <0.02 NG/ML (ref 0–0.04)
TROPONIN I SERPL-MCNC: <0.02 NG/ML (ref 0–0.04)
WBC # BLD AUTO: 13.1 K/UL (ref 4.6–13.2)

## 2021-06-18 PROCEDURE — 80053 COMPREHEN METABOLIC PANEL: CPT

## 2021-06-18 PROCEDURE — 85025 COMPLETE CBC W/AUTO DIFF WBC: CPT

## 2021-06-18 PROCEDURE — 84484 ASSAY OF TROPONIN QUANT: CPT

## 2021-06-18 PROCEDURE — 71045 X-RAY EXAM CHEST 1 VIEW: CPT

## 2021-06-18 PROCEDURE — 93005 ELECTROCARDIOGRAM TRACING: CPT

## 2021-06-18 PROCEDURE — 99284 EMERGENCY DEPT VISIT MOD MDM: CPT

## 2021-06-19 NOTE — ED PROVIDER NOTES
EMERGENCY DEPARTMENT HISTORY AND PHYSICAL EXAM    9:48 PM severe cramping in the emergency department 60 patient's 7 nurses. Patient seen in [facility]. Date: 6/18/2021  Patient Name: Anika Rowley    History of Presenting Illness     No chief complaint on file. History Provided By: patient    Additional History (Context): Anika Rowley is a 31 y.o. male presents with at 55 today while walking to the grocery store had onset of left-sided high upper chest pain, sharp in character, continued while he was at the grocery store and for the entire walk home and remained at home. He spoke to his sister who helps him make decisions, they called 911 to have him transported to the emergency department. At worst it was a 9 or 10. At the time my examination it is a 6-7. At the end of the interview patient tearfully lets me know that his mother has recently passed away. *. PCP: Dequan Gomez MD    Chief Complaint:   Duration:    Timing:    Location:   Quality:   Severity:   Modifying Factors:   Associated Symptoms:       Current Outpatient Medications   Medication Sig Dispense Refill    gemfibroziL (LOPID) 600 mg tablet Take 1 Tab by mouth two (2) times a day. 180 Tab 3    lisinopriL (PRINIVIL, ZESTRIL) 2.5 mg tablet Take 1 Tab by mouth daily. TAKE 1 TABLET BY MOUTH ONCE DAILY FOR HIGH BLOOD PRESSURE 90 Tab 3    tiotropium bromide (Spiriva Respimat) 1.25 mcg/actuation inhaler Take 2 Puffs by inhalation daily. Indications: controller medication for asthma 1 Inhaler 11    albuterol (Ventolin HFA) 90 mcg/actuation inhaler Take 2 Puffs by inhalation every six (6) hours as needed for Wheezing. 1 Inhaler 5    ketoconazole (Nizoral) 2 % shampoo Apply as shampoo 3 times per week.  120 mL 11    carvediloL (COREG) 6.25 mg tablet TAKE 1 TABLET BY MOUTH 2 TIMES A DAY WITH MEALS FOR HIGH BLOOD PRESSURE 60 Tab 6    cyclobenzaprine (FLEXERIL) 10 mg tablet TAKE 1 TABLET BY MOUTH 3 TIMES A DAY AS NEEDED FOR MUSCLE SPASM 30 Tab 3    lidocaine (Lidocaine Viscous) 2 % solution Put 10 mL in mouth and swish and spit out QAC and at bedtime 200 mL 0    naproxen (NAPROSYN) 500 mg tablet Take 1 Tab by mouth two (2) times daily (with meals). Indications: pain 60 Tab 11    guaiFENesin ER (MUCINEX) 600 mg ER tablet Take 1 Tab by mouth two (2) times a day. 20 Tab 1    fluticasone propionate (FLONASE) 50 mcg/actuation nasal spray One squirt each nostril BID 1 Bottle 4    triamcinolone acetonide (KENALOG) 0.1 % topical cream Apply  to affected area two (2) times daily as needed for Skin Irritation. 15 g 11       Past History     Past Medical History:  Past Medical History:   Diagnosis Date    Asthma 5/5/2014    possible asthma r/o cardiac etiology h/o chest trauma as a child     Hypertension     Knee pain, right     Other specified cardiac dysrhythmias(427.89) 5/5/2014    marked sinus bradycardia     Palpitations 5/5/2014    r/o arrythmia     Shortness of breath 5/5/2014    possible asthma r/o cardiac etiology h/o chest trauma as a child        Past Surgical History:  Past Surgical History:   Procedure Laterality Date    HX ADENOIDECTOMY         Family History:  Family History   Problem Relation Age of Onset    Diabetes Mother     Hypertension Mother     Heart Attack Father 48    Hypertension Sister     Cancer Maternal Aunt     Stroke Maternal Grandmother     Stroke Maternal Grandfather     Heart Surgery Neg Hx        Social History:  Social History     Tobacco Use    Smoking status: Never Smoker    Smokeless tobacco: Never Used   Substance Use Topics    Alcohol use: No     Alcohol/week: 0.0 standard drinks    Drug use: No       Allergies: Allergies   Allergen Reactions    Penicillins Unable to Obtain         Review of Systems     Review of Systems   Constitutional: Negative for diaphoresis and fever. HENT: Negative for congestion and sore throat. Eyes: Negative for pain and itching.    Respiratory: Negative for cough and shortness of breath. Cardiovascular: Positive for chest pain. Negative for palpitations. Gastrointestinal: Negative for abdominal pain and diarrhea. Endocrine: Negative for polydipsia and polyuria. Genitourinary: Negative for dysuria and hematuria. Musculoskeletal: Negative for arthralgias and myalgias. Skin: Negative for rash and wound. Neurological: Negative for seizures and syncope. Hematological: Does not bruise/bleed easily. Psychiatric/Behavioral: Negative for agitation and hallucinations. Physical Exam       No data found. Physical Exam  Vitals and nursing note reviewed. Constitutional:       General: He is not in acute distress. Appearance: He is well-developed. He is obese. He is not ill-appearing. HENT:      Head: Normocephalic and atraumatic. Eyes:      General: No scleral icterus. Conjunctiva/sclera: Conjunctivae normal.   Neck:      Vascular: No JVD. Cardiovascular:      Rate and Rhythm: Normal rate and regular rhythm. Heart sounds: Normal heart sounds. Comments: 4 intact extremity pulses  Pulmonary:      Effort: Pulmonary effort is normal.      Breath sounds: Normal breath sounds. Chest:      Chest wall: No tenderness. Abdominal:      Palpations: Abdomen is soft. There is no mass. Tenderness: There is no abdominal tenderness. Musculoskeletal:         General: Normal range of motion. Cervical back: Normal range of motion and neck supple. Lymphadenopathy:      Cervical: No cervical adenopathy. Skin:     General: Skin is warm and dry. Neurological:      Mental Status: He is alert.            Diagnostic Study Results   Labs -  Recent Results (from the past 12 hour(s))   EKG, 12 LEAD, INITIAL    Collection Time: 06/18/21  7:41 PM   Result Value Ref Range    Ventricular Rate 105 BPM    Atrial Rate 105 BPM    P-R Interval 172 ms    QRS Duration 86 ms    Q-T Interval 342 ms    QTC Calculation (Bezet) 452 ms Calculated P Axis 31 degrees    Calculated R Axis 54 degrees    Calculated T Axis 32 degrees    Diagnosis       Sinus tachycardia  Otherwise normal ECG  When compared with ECG of 25-JAN-2020 11:10,  No significant change was found     CBC WITH AUTOMATED DIFF    Collection Time: 06/18/21  7:53 PM   Result Value Ref Range    WBC 13.1 4.6 - 13.2 K/uL    RBC 5.15 4.35 - 5.65 M/uL    HGB 14.6 13.0 - 16.0 g/dL    HCT 43.3 36.0 - 48.0 %    MCV 84.1 74.0 - 97.0 FL    MCH 28.3 24.0 - 34.0 PG    MCHC 33.7 31.0 - 37.0 g/dL    RDW 13.4 11.6 - 14.5 %    PLATELET 084 (H) 793 - 420 K/uL    MPV 10.7 9.2 - 11.8 FL    NEUTROPHILS 70 40 - 73 %    LYMPHOCYTES 22 21 - 52 %    MONOCYTES 7 3 - 10 %    EOSINOPHILS 0 0 - 5 %    BASOPHILS 0 0 - 2 %    ABS. NEUTROPHILS 9.2 (H) 1.8 - 8.0 K/UL    ABS. LYMPHOCYTES 2.9 0.9 - 3.6 K/UL    ABS. MONOCYTES 0.9 0.05 - 1.2 K/UL    ABS. EOSINOPHILS 0.0 0.0 - 0.4 K/UL    ABS. BASOPHILS 0.0 0.0 - 0.1 K/UL    DF AUTOMATED     CARDIAC PANEL,(CK, CKMB & TROPONIN)    Collection Time: 06/18/21  7:53 PM   Result Value Ref Range    CK - MB <1.0 <3.6 ng/ml    CK-MB Index  0.0 - 4.0 %     CALCULATION NOT PERFORMED WHEN RESULT IS BELOW LINEAR LIMIT     39 - 308 U/L    Troponin-I, QT <0.02 0.0 - 1.111 NG/ML   METABOLIC PANEL, COMPREHENSIVE    Collection Time: 06/18/21  7:53 PM   Result Value Ref Range    Sodium 139 136 - 145 mmol/L    Potassium 3.5 3.5 - 5.5 mmol/L    Chloride 108 100 - 111 mmol/L    CO2 27 21 - 32 mmol/L    Anion gap 4 3.0 - 18 mmol/L    Glucose 111 (H) 74 - 99 mg/dL    BUN 17 7.0 - 18 MG/DL    Creatinine 1.01 0.6 - 1.3 MG/DL    BUN/Creatinine ratio 17 12 - 20      GFR est AA >60 >60 ml/min/1.73m2    GFR est non-AA >60 >60 ml/min/1.73m2    Calcium 9.0 8.5 - 10.1 MG/DL    Bilirubin, total 0.4 0.2 - 1.0 MG/DL    ALT (SGPT) 106 (H) 16 - 61 U/L    AST (SGOT) 40 (H) 10 - 38 U/L    Alk.  phosphatase 107 45 - 117 U/L    Protein, total 8.5 (H) 6.4 - 8.2 g/dL    Albumin 3.8 3.4 - 5.0 g/dL    Globulin 4.7 (H) 2.0 - 4.0 g/dL    A-G Ratio 0.8 0.8 - 1.7         Radiologic Studies -   XR CHEST PORT    (Results Pending)     No results found. Medications ordered:   Medications - No data to display      Medical Decision Making   Initial Medical Decision Making and DDx:     Prior work-up by Dr. Salvador Allen cardiology diagnosed with cardiomyopathy. Also has hypertension and morbid obesity. Initial EKG sinus tachycardia at a rate of 105, troponin negative. No acute process on the chest x-ray, congestion pneumothorax pneumonia etc.  We will check a repeat troponin and likely be suitable for discharge. ED Course: Progress Notes, Reevaluation, and Consults:     10:03 PM patient's permission called patient's Sister Tushar Del Valle who helps him make decisions, consistent story about the events leading up to his ER visit, he will be dischargeable after a second negative troponin. I do not think this is cardiac chest pain do not suspect aortic disease PE pneumonia pneumothorax. Suspect musculoskeletal cause. Possibly stress. I am the first provider for this patient. I reviewed the vital signs, available nursing notes, past medical history, past surgical history, family history and social history. Patient Vitals for the past 12 hrs:   Temp Pulse Resp BP SpO2   06/18/21 2109 98 °F (36.7 °C) 90 16 (!) 116/58 100 %       Vital Signs-Reviewed the patient's vital signs. Pulse Oximetry Analysis, Cardiac Monitor, 12 lead ekg: No hypoxia on room air  Interpreted by the EP. Records Reviewed: Nursing notes reviewed (Time of Review: 9:48 PM)    Procedures:   Critical Care Time:   Aspirin: (was aspirin given for stroke?)    Diagnosis     Clinical Impression: No diagnosis found.     Disposition:       Follow-up Information    None          Patient's Medications   Start Taking    No medications on file   Continue Taking    ALBUTEROL (VENTOLIN HFA) 90 MCG/ACTUATION INHALER    Take 2 Puffs by inhalation every six (6) hours as needed for Wheezing. CARVEDILOL (COREG) 6.25 MG TABLET    TAKE 1 TABLET BY MOUTH 2 TIMES A DAY WITH MEALS FOR HIGH BLOOD PRESSURE    CYCLOBENZAPRINE (FLEXERIL) 10 MG TABLET    TAKE 1 TABLET BY MOUTH 3 TIMES A DAY AS NEEDED FOR MUSCLE SPASM    FLUTICASONE PROPIONATE (FLONASE) 50 MCG/ACTUATION NASAL SPRAY    One squirt each nostril BID    GEMFIBROZIL (LOPID) 600 MG TABLET    Take 1 Tab by mouth two (2) times a day. GUAIFENESIN ER (MUCINEX) 600 MG ER TABLET    Take 1 Tab by mouth two (2) times a day. KETOCONAZOLE (NIZORAL) 2 % SHAMPOO    Apply as shampoo 3 times per week. LIDOCAINE (LIDOCAINE VISCOUS) 2 % SOLUTION    Put 10 mL in mouth and swish and spit out QAC and at bedtime    LISINOPRIL (PRINIVIL, ZESTRIL) 2.5 MG TABLET    Take 1 Tab by mouth daily. TAKE 1 TABLET BY MOUTH ONCE DAILY FOR HIGH BLOOD PRESSURE    NAPROXEN (NAPROSYN) 500 MG TABLET    Take 1 Tab by mouth two (2) times daily (with meals). Indications: pain    TIOTROPIUM BROMIDE (SPIRIVA RESPIMAT) 1.25 MCG/ACTUATION INHALER    Take 2 Puffs by inhalation daily. Indications: controller medication for asthma    TRIAMCINOLONE ACETONIDE (KENALOG) 0.1 % TOPICAL CREAM    Apply  to affected area two (2) times daily as needed for Skin Irritation.    These Medications have changed    No medications on file   Stop Taking    No medications on file     _______________________________    Notes:    Gustavo Rahman MD using Dragon dictation      _______________________________

## 2021-06-19 NOTE — ED TRIAGE NOTES
Pt arrived via EMS from home with complaint of sudden onset chest pain. PT arrived in no apparent distress.

## 2021-06-20 LAB
ATRIAL RATE: 105 BPM
CALCULATED P AXIS, ECG09: 31 DEGREES
CALCULATED R AXIS, ECG10: 54 DEGREES
CALCULATED T AXIS, ECG11: 32 DEGREES
DIAGNOSIS, 93000: NORMAL
P-R INTERVAL, ECG05: 172 MS
Q-T INTERVAL, ECG07: 342 MS
QRS DURATION, ECG06: 86 MS
QTC CALCULATION (BEZET), ECG08: 452 MS
VENTRICULAR RATE, ECG03: 105 BPM

## 2021-07-16 NOTE — PROGRESS NOTES
07/20/21          ICD-10-CM ICD-9-CM    1. Encounter for immunization  Z23 V03.89 pneumococcal 23-valent (PNEUMOVAX 23) 25 mcg/0.5 mL injection      diph,Pertuss,Acell,,Tet Vac-PF (ADACEL) 2 Lf-(2.5-5-3-5 mcg)-5Lf/0.5 mL susp   2. Encounter for hepatitis C screening test for low risk patient  Z11.59 V73.89 HEPATITIS C AB   3. Essential hypertension with goal blood pressure less than 140/90  I10 401.9    4. Hyperlipidemia, unspecified hyperlipidemia type  E78.5 272.4    5. Morbid obesity ([address]  E66.01 278.01          The patient was seen for essential hypertension, good control continue present regimen. Hyperlipidemia continue present regimen. Morbid obesity, weight is decreasing, continue present management. We will screen for hepatitis C. Right lateral thigh pain differential diagnosis includes sciatica, meralgia paresthetica, lateral tibial band syndrome or other etiologies. At this time we will just use Tylenol or Motrin. He is able to walk at least half an hour without difficulty. If not improving or symptoms worsen we will initiate work-up. Lab results and schedule of future lab studies reviewed with patient  Diagnostic and radiologic results and the schedule of future studies were reviewed with the patient  reviewed diet, exercise and weight control  All questions were answered and understood.       Health Maintenance Due   Topic Date Due    Hepatitis C Screening  Never done    Pneumococcal 0-64 years (1 of 2 - PPSV23) Never done    COVID-19 Vaccine (1) Never done    DTaP/Tdap/Td series (2 - Td or Tdap) 01/01/2015   Patient has previously received the Covid vaccines      Subjective:   Samir Moody is a 31 y.o. male has Palpitations, Shortness of breath, Other specified cardiac dysrhythmias(427.89), Cardiomyopathy ([address] Essential hypertension with goal blood pressure less than 140/90, Morbid obesity ([address] Seborrheic dermatitis, Vitamin D deficiency, Moderate persistent asthma without complication, Bilateral low back pain without sciatica, and Hyperlipidemia on their problem list.. No chief complaint on file. He was seen by virtual visit May 2020. His hypertension was well controlled. His hyperlipidemia was in good control. The patient had an unhealthy weight which was improving post increased exercise. He was to be considered for bariatric surgery if not improving. Chronic bilateral low back pain associated with neck pain treated with Aleve. The patient had an episode of acute pharyngitis. ED chest pain  Hypertension  The patient has no headaches, visual changes, chest pain or pressure,dyspnea, orthopnea, or PND. There is no problem with the medication. Hyperlipidemia   The patient denies any myalgias or weakness. No abdominal discomfort admitted to. The patient denies any difficulty with or side effects from the medication. BP Readings from Last 3 Encounters:   07/20/21 109/76   06/18/21 (!) 116/58   02/19/21 121/73     Right leg pain  Patient admits to right leg pain. Is located in the right lateral thigh region. Is described as an aching sensation. The patient is able to move the upper and lower extremities normally with apparent normal strength and ambulate well. No obvious difficulty with balance is noted. Provoked by prolonged walking. It is relieved by rest.  He has to walk several miles before it begins. It does not always happen. There is no change in color in the area. He is taken nothing for relief of the symptoms. There is no history of peripheral vascular disease. The patient is overweight. Overweight  The patient states that the weight has slowly decreased. Patient's diet has improved, he states. The patient denies edema of the lower extremities. Aggravating factors include having to make his own meals. .  Associated symptoms include no joint aches. No heat or cold intolerance. .  body mass index is 53.9 kg/m².   Wt Readings from Last 3 Encounters: 07/20/21 (!) 365 lb (165.6 kg)   05/14/21 (!) 381 lb (172.8 kg)   02/19/21 (!) 391 lb 3.2 oz (177.4 kg)           has Palpitations, Shortness of breath, Other specified cardiac dysrhythmias(427.89), Cardiomyopathy ([address] Essential hypertension with goal blood pressure less than 140/90, Morbid obesity ([address] Seborrheic dermatitis, Vitamin D deficiency, Moderate persistent asthma without complication, Bilateral low back pain without sciatica, and Hyperlipidemia on their problem list.    Past Surgical History:   Procedure Laterality Date    HX ADENOIDECTOMY        reports that he has never smoked. He has never used smokeless tobacco. He reports that he does not drink alcohol and does not use drugs. family history includes Cancer in his maternal aunt; Diabetes in his mother; Heart Attack (age of onset: 48) in his father; Hypertension in his mother and sister; Stroke in his maternal grandfather and maternal grandmother. Review of Systems   Constitutional: Negative for chills and fever. HENT: Positive for sore throat. Negative for congestion. Respiratory: Negative for shortness of breath and wheezing. Musculoskeletal: Positive for back pain, joint pain, myalgias and neck pain. Negative for falls. Right thigh pain on walking  Better at rest  Can walk 20 minutes   Neurological: Positive for headaches. Psychiatric/Behavioral: The patient is not nervous/anxious. Visit Vitals  /76 (BP 1 Location: Left arm, BP Patient Position: Sitting, BP Cuff Size: Large adult)   Pulse (!) 109   Temp 98.2 °F (36.8 °C) (Oral)   Resp 16   Ht 5' 9\" (1.753 m)   Wt (!) 365 lb (165.6 kg)   SpO2 94%   BMI 53.90 kg/m²     Wt Readings from Last 3 Encounters:   07/20/21 (!) 365 lb (165.6 kg)   05/14/21 (!) 381 lb (172.8 kg)   02/19/21 (!) 391 lb 3.2 oz (177.4 kg)         Physical Exam  Vitals and nursing note reviewed. Constitutional:       General: He is not in acute distress. Appearance: He is well-developed. HENT:      Right Ear: External ear normal.      Left Ear: External ear normal.      Nose: Nose normal.   Eyes:      Pupils: Pupils are equal, round, and reactive to light. Neck:      Thyroid: No thyromegaly. Comments: Carotid bruit absent  Cardiovascular:      Rate and Rhythm: Normal rate and regular rhythm. Heart sounds: No murmur heard. No friction rub. No gallop. Pulmonary:      Effort: Pulmonary effort is normal. No respiratory distress. Breath sounds: Normal breath sounds. Abdominal:      General: There is no distension. Palpations: There is no mass. Tenderness: There is no abdominal tenderness. Musculoskeletal:         General: No tenderness (Right side of the neck aggravated by rotation of the neck to the right and right lateral extension). Cervical back: Neck supple. Lymphadenopathy:      Cervical: No cervical adenopathy. Skin:     General: Skin is warm and dry. Neurological:      Mental Status: He is alert and oriented to person, place, and time. Psychiatric:      Comments: Nice gentleman, likes to tell jokes            Results for orders placed or performed during the hospital encounter of 06/18/21   CBC WITH AUTOMATED DIFF   Result Value Ref Range    WBC 13.1 4.6 - 13.2 K/uL    RBC 5.15 4.35 - 5.65 M/uL    HGB 14.6 13.0 - 16.0 g/dL    HCT 43.3 36.0 - 48.0 %    MCV 84.1 74.0 - 97.0 FL    MCH 28.3 24.0 - 34.0 PG    MCHC 33.7 31.0 - 37.0 g/dL    RDW 13.4 11.6 - 14.5 %    PLATELET 013 (H) 368 - 420 K/uL    MPV 10.7 9.2 - 11.8 FL    NEUTROPHILS 70 40 - 73 %    LYMPHOCYTES 22 21 - 52 %    MONOCYTES 7 3 - 10 %    EOSINOPHILS 0 0 - 5 %    BASOPHILS 0 0 - 2 %    ABS. NEUTROPHILS 9.2 (H) 1.8 - 8.0 K/UL    ABS. LYMPHOCYTES 2.9 0.9 - 3.6 K/UL    ABS. MONOCYTES 0.9 0.05 - 1.2 K/UL    ABS. EOSINOPHILS 0.0 0.0 - 0.4 K/UL    ABS.  BASOPHILS 0.0 0.0 - 0.1 K/UL    DF AUTOMATED     CARDIAC PANEL,(CK, CKMB & TROPONIN)   Result Value Ref Range    CK - MB <1.0 <3.6 ng/ml CK-MB Index  0.0 - 4.0 %     CALCULATION NOT PERFORMED WHEN RESULT IS BELOW LINEAR LIMIT     39 - 308 U/L    Troponin-I, QT <0.02 0.0 - 1.094 NG/ML   METABOLIC PANEL, COMPREHENSIVE   Result Value Ref Range    Sodium 139 136 - 145 mmol/L    Potassium 3.5 3.5 - 5.5 mmol/L    Chloride 108 100 - 111 mmol/L    CO2 27 21 - 32 mmol/L    Anion gap 4 3.0 - 18 mmol/L    Glucose 111 (H) 74 - 99 mg/dL    BUN 17 7.0 - 18 MG/DL    Creatinine 1.01 0.6 - 1.3 MG/DL    BUN/Creatinine ratio 17 12 - 20      GFR est AA >60 >60 ml/min/1.73m2    GFR est non-AA >60 >60 ml/min/1.73m2    Calcium 9.0 8.5 - 10.1 MG/DL    Bilirubin, total 0.4 0.2 - 1.0 MG/DL    ALT (SGPT) 106 (H) 16 - 61 U/L    AST (SGOT) 40 (H) 10 - 38 U/L    Alk. phosphatase 107 45 - 117 U/L    Protein, total 8.5 (H) 6.4 - 8.2 g/dL    Albumin 3.8 3.4 - 5.0 g/dL    Globulin 4.7 (H) 2.0 - 4.0 g/dL    A-G Ratio 0.8 0.8 - 1.7     TROPONIN I   Result Value Ref Range    Troponin-I, QT <0.02 0.0 - 0.045 NG/ML   EKG, 12 LEAD, INITIAL   Result Value Ref Range    Ventricular Rate 105 BPM    Atrial Rate 105 BPM    P-R Interval 172 ms    QRS Duration 86 ms    Q-T Interval 342 ms    QTC Calculation (Bezet) 452 ms    Calculated P Axis 31 degrees    Calculated R Axis 54 degrees    Calculated T Axis 32 degrees    Diagnosis       Sinus tachycardia  Otherwise normal ECG  When compared with ECG of 25-JAN-2020 11:10,  No significant change was found  Confirmed by Gina Merino M.D., [address] [place] [address] ([id number]) on 6/20/2021 10:05:02 PM           We discussed the expected course, resolution and complications of the diagnosis(es) in detail. Medication risks, benefits, costs, interactions, and alternatives were discussed as indicated. I advised him to contact the office if his condition worsens, changes or fails to improve as anticipated. He expressed understanding with the diagnosis(es) and plan. This note was done with the assistance of dragon speech software.   Some inadvertent errors or omissions may be present

## 2021-07-20 ENCOUNTER — OFFICE VISIT (OUTPATIENT)
Dept: FAMILY MEDICINE CLINIC | Age: 26
End: 2021-07-20
Payer: MEDICAID

## 2021-07-20 VITALS
HEART RATE: 109 BPM | TEMPERATURE: 98.2 F | BODY MASS INDEX: 46.65 KG/M2 | RESPIRATION RATE: 16 BRPM | SYSTOLIC BLOOD PRESSURE: 109 MMHG | DIASTOLIC BLOOD PRESSURE: 76 MMHG | HEIGHT: 69 IN | OXYGEN SATURATION: 94 % | WEIGHT: 315 LBS

## 2021-07-20 DIAGNOSIS — I10 ESSENTIAL HYPERTENSION WITH GOAL BLOOD PRESSURE LESS THAN 140/90: ICD-10-CM

## 2021-07-20 DIAGNOSIS — Z23 ENCOUNTER FOR IMMUNIZATION: Primary | ICD-10-CM

## 2021-07-20 DIAGNOSIS — Z11.59 ENCOUNTER FOR HEPATITIS C SCREENING TEST FOR LOW RISK PATIENT: ICD-10-CM

## 2021-07-20 DIAGNOSIS — E78.5 HYPERLIPIDEMIA, UNSPECIFIED HYPERLIPIDEMIA TYPE: ICD-10-CM

## 2021-07-20 DIAGNOSIS — E66.01 MORBID OBESITY (HCC): ICD-10-CM

## 2021-07-20 PROCEDURE — 99213 OFFICE O/P EST LOW 20 MIN: CPT | Performed by: EMERGENCY MEDICINE

## 2021-07-20 NOTE — PROGRESS NOTES
Rayshawn Gruber is a 31 y.o. male that is here for a No chief complaint on file. 1. Have you been to the ER, urgent care clinic since your last visit? Hospitalized since your last visit? yes [facility]     2. Have you seen or consulted any other health care providers outside of the [address] [place] [address] since your last visit? Include any pap smears or colon screening.  no      Health Maintenance reviewed  yes      Upcoming Appts  no      VORB: No orders of the defined types were placed in this encounter.   Farhana Diop MD/ Briseida Murphy MA

## 2021-07-20 NOTE — PATIENT INSTRUCTIONS
Hyperlipidemia: After Your Visit  Your Care Instructions  Hyperlipidemia is too much fat in your blood. The body has several kinds of fat, including cholesterol and triglycerides. Your body needs fat for many things, such as making new cells. But too much fat in your blood increases your chances of having a heart attack or stroke. You may be able to lower your cholesterol and triglycerides with a heart-healthy diet, exercise, and if needed, medicine. Your doctor may want you to try lifestyle changes first to see whether they lower the fat in your blood. You may need to take medicine if lifestyle changes do not lower the fat in your blood enough. Follow-up care is a key part of your treatment and safety. Be sure to make and go to all appointments, and call your doctor if you are having problems. Its also a good idea to know your test results and keep a list of the medicines you take. How can you care for yourself at home? Take your medicines  · Take your medicines exactly as prescribed. Call your doctor if you think you are having a problem with your medicine. · If you take medicine to lower your cholesterol, go to follow-up visits. You will need to have blood tests. · Do not take large doses of niacin, which is a B vitamin, while taking medicine called statins. It may increase the chance of muscle pain and liver problems. · Talk to your doctor about avoiding grapefruit juice if you are taking statins. Grapefruit juice can raise the level of this medicine in your blood. This could increase side effects. Eat more fruits, vegetables, and fiber  · Fruits and vegetables have lots of nutrients that help protect against heart disease, and they have littleif anyfat. Try to eat at least five servings a day. Dark green, deep orange, or yellow fruits and vegetables are healthy choices. · Keep carrots, celery, and other veggies handy for snacks.  Buy fruit that is in season and store it where you can see it so that you will be tempted to eat it. Cook dishes that have a lot of veggies in them, such as stir-fries and soups. · Foods high in fiber may reduce your cholesterol and provide important vitamins and minerals. High-fiber foods include whole-grain cereals and breads, oatmeal, beans, brown rice, citrus fruits, and apples. · Buy whole-grain breads and cereals instead of white bread and pastries. Limit saturated fat  · Read food labels and try to avoid saturated fat and trans fat. They increase your risk of heart disease. · Use olive or canola oil when you cook. Try cholesterol-lowering spreads, such as Benecol or Take Control. · Bake, broil, grill, or steam foods instead of frying them. · Limit the amount of high-fat meats you eat, including hot dogs and sausages. Cut out all visible fat when you prepare meat. · Eat fish, skinless poultry, and soy products such as tofu instead of high-fat meats. Soybeans may be especially good for your heart. Eat at least two servings of fish a week. Certain fish, such as salmon, contain omega-3 fatty acids, which may help reduce your risk of heart attack. · Choose low-fat or fat-free milk and dairy products. Get exercise, limit alcohol, and quit smoking  · Get more exercise. Work with your doctor to set up an exercise program. Even if you can do only a small amount, exercise will help you get stronger, have more energy, and manage your weight and your stress. Walking is an easy way to get exercise. Gradually increase the amount you walk every day. Aim for at least 30 minutes on most days of the week. You also may want to swim, bike, or do other activities. · Limit alcohol to no more than 2 drinks a day for men and 1 drink a day for women. · Do not smoke. If you need help quitting, talk to your doctor about stop-smoking programs and medicines. These can increase your chances of quitting for good. When should you call for help?   Call 911 anytime you think you may need emergency care. For example, call if:  · You have symptoms of a heart attack. These may include:  ¨ Chest pain or pressure, or a strange feeling in the chest.  ¨ Sweating. ¨ Shortness of breath. ¨ Nausea or vomiting. ¨ Pain, pressure, or a strange feeling in the back, neck, jaw, or upper belly or in one or both shoulders or arms. ¨ Lightheadedness or sudden weakness. ¨ A fast or irregular heartbeat. After you call 911, the  may tell you to chew 1 adult-strength or 2 to 4 low-dose aspirin. Wait for an ambulance. Do not try to drive yourself. · You have signs of a stroke. These may include:  ¨ Sudden numbness, paralysis, or weakness in your face, arm, or leg, especially on only one side of your body. ¨ New problems with walking or balance. ¨ Sudden vision changes. ¨ Drooling or slurred speech. ¨ New problems speaking or understanding simple statements, or feeling confused. ¨ A sudden, severe headache that is different from past headaches. · You passed out (lost consciousness). Call your doctor now or seek immediate medical care if:  · You have muscle pain or weakness. Watch closely for changes in your health, and be sure to contact your doctor if:  · You are very tired. · You have an upset stomach, gas, constipation, or belly pain or cramps. Where can you learn more? Go to Braingaze.be  Enter C406 in the search box to learn more about \"Hyperlipidemia: After Your Visit. \"   © 1164-8401 Healthwise, Incorporated. Care instructions adapted under license by [facility] (which disclaims liability or warranty for this information). This care instruction is for use with your licensed healthcare professional. If you have questions about a medical condition or this instruction, always ask your healthcare professional. [place] [address] any warranty or liability for your use of this information.   Content Version: 6.9.636911; Last Revised: October 13, 2011 High Blood Pressure: Care Instructions  Overview     It's normal for blood pressure to go up and down throughout the day. But if it stays up, you have high blood pressure. Another name for high blood pressure is hypertension. Despite what a lot of people think, high blood pressure usually doesn't cause headaches or make you feel dizzy or lightheaded. It usually has no symptoms. But it does increase your risk of stroke, heart attack, and other problems. You and your doctor will talk about your risks of these problems based on your blood pressure. Your doctor will give you a goal for your blood pressure. Your goal will be based on your health and your age. Lifestyle changes, such as eating healthy and being active, are always important to help lower blood pressure. You might also take medicine to reach your blood pressure goal.  Follow-up care is a key part of your treatment and safety. Be sure to make and go to all appointments, and call your doctor if you are having problems. It's also a good idea to know your test results and keep a list of the medicines you take. How can you care for yourself at home? Medical treatment  · If you stop taking your medicine, your blood pressure will go back up. You may take one or more types of medicine to lower your blood pressure. Be safe with medicines. Take your medicine exactly as prescribed. Call your doctor if you think you are having a problem with your medicine. · Talk to your doctor before you start taking aspirin every day. Aspirin can help certain people lower their risk of a heart attack or stroke. But taking aspirin isn't right for everyone, because it can cause serious bleeding. · See your doctor regularly. You may need to see the doctor more often at first or until your blood pressure comes down. · If you are taking blood pressure medicine, talk to your doctor before you take decongestants or anti-inflammatory medicine, such as ibuprofen.  Some of these medicines can raise blood pressure. · Learn how to check your blood pressure at home. Lifestyle changes  · Stay at a healthy weight. This is especially important if you put on weight around the waist. Losing even 10 pounds can help you lower your blood pressure. · If your doctor recommends it, get more exercise. Walking is a good choice. Bit by bit, increase the amount you walk every day. Try for at least 30 minutes on most days of the week. You also may want to swim, bike, or do other activities. · Avoid or limit alcohol. Talk to your doctor about whether you can drink any alcohol. · Try to limit how much sodium you eat to less than 2,300 milligrams (mg) a day. Your doctor may ask you to try to eat less than 1,500 mg a day. · Eat plenty of fruits (such as bananas and oranges), vegetables, legumes, whole grains, and low-fat dairy products. · Lower the amount of saturated fat in your diet. Saturated fat is found in animal products such as milk, cheese, and meat. Limiting these foods may help you lose weight and also lower your risk for heart disease. · Do not smoke. Smoking increases your risk for heart attack and stroke. If you need help quitting, talk to your doctor about stop-smoking programs and medicines. These can increase your chances of quitting for good. When should you call for help? Call  911 anytime you think you may need emergency care. This may mean having symptoms that suggest that your blood pressure is causing a serious heart or blood vessel problem. Your blood pressure may be over 180/120. For example, call 911 if:    · You have symptoms of a heart attack. These may include:  ? Chest pain or pressure, or a strange feeling in the chest.  ? Sweating. ? Shortness of breath. ? Nausea or vomiting. ? Pain, pressure, or a strange feeling in the back, neck, jaw, or upper belly or in one or both shoulders or arms. ? Lightheadedness or sudden weakness. ? A fast or irregular heartbeat. · You have symptoms of a stroke. These may include:  ? Sudden numbness, tingling, weakness, or loss of movement in your face, arm, or leg, especially on only one side of your body. ? Sudden vision changes. ? Sudden trouble speaking. ? Sudden confusion or trouble understanding simple statements. ? Sudden problems with walking or balance. ? A sudden, severe headache that is different from past headaches. · You have severe back or belly pain. Do not wait until your blood pressure comes down on its own. Get help right away. Call your doctor now or seek immediate care if:    · Your blood pressure is much higher than normal (such as 180/120 or higher), but you don't have symptoms. · You think high blood pressure is causing symptoms, such as:  ? Severe headache.  ? Blurry vision. Watch closely for changes in your health, and be sure to contact your doctor if:    · Your blood pressure measures higher than your doctor recommends at least 2 times. That means the top number is higher or the bottom number is higher, or both. · You think you may be having side effects from your blood pressure medicine. Where can you learn more? Go to http://www.gray.com/  Enter [id number] in the search box to learn more about \"High Blood Pressure: Care Instructions. \"  Current as of: August 31, 2020               Content Version: 12.8  © 2006-2021 [employer]. Care instructions adapted under license by [email or web address] (which disclaims liability or warranty for this information). If you have questions about a medical condition or this instruction, always ask your healthcare professional. [place] [address] any warranty or liability for your use of this information. Learning About the [address] Diet  What is the Mediterranean diet? The Mediterranean diet is a style of eating rather than a diet plan.  It features foods eaten in [place], [place], [place] [facility] and [place], and other countries along the [facility]. It emphasizes eating foods like fish, fruits, vegetables, beans, high-fiber breads and whole grains, nuts, and olive oil. This style of eating includes limited red meat, cheese, and sweets. Why choose the Mediterranean diet? A Mediterranean-style diet may improve heart health. It contains more fat than other heart-healthy diets. But the fats are mainly from nuts, unsaturated oils (such as fish oils and olive oil), and certain nut or seed oils (such as canola, soybean, or flaxseed oil). These fats may help protect the heart and blood vessels. How can you get started on the Mediterranean diet? Here are some things you can do to switch to a more Mediterranean way of eating. What to eat  · Eat a variety of fruits and vegetables each day, such as grapes, blueberries, tomatoes, broccoli, peppers, figs, olives, spinach, eggplant, beans, lentils, and chickpeas. · Eat a variety of whole-grain foods each day, such as oats, brown rice, and whole wheat bread, pasta, and couscous. · Eat fish at least 2 times a week. Try tuna, salmon, mackerel, lake trout, herring, or sardines. · Eat moderate amounts of low-fat dairy products, such as milk, cheese, or yogurt. · Eat moderate amounts of poultry and eggs. · Choose healthy (unsaturated) fats, such as nuts, olive oil, and certain nut or seed oils like canola, soybean, and flaxseed. · Limit unhealthy (saturated) fats, such as butter, palm oil, and coconut oil. And limit fats found in animal products, such as meat and dairy products made with whole milk. Try to eat red meat only a few times a month in very small amounts. · Limit sweets and desserts to only a few times a week. This includes sugar-sweetened drinks like soda. The Mediterranean diet may also include red wine with your meal1 glass each day for women and up to 2 glasses a day for men.   Tips for eating at home  · Use herbs, spices, garlic, lemon zest, and citrus juice instead of salt to add flavor to foods. · Add avocado slices to your sandwich instead of ortega. · Have fish for lunch or dinner instead of red meat. Brush the fish with olive oil, and broil or grill it. · Sprinkle your salad with seeds or nuts instead of cheese. · Cook with olive or canola oil instead of butter or oils that are high in saturated fat. · Switch from 2% milk or whole milk to 1% or fat-free milk. · Dip raw vegetables in a vinaigrette dressing or hummus instead of dips made from mayonnaise or sour cream.  · Have a piece of fruit for dessert instead of a piece of cake. Try baked apples, or have some dried fruit. Tips for eating out  · Try broiled, grilled, baked, or poached fish instead of having it fried or breaded. · Ask your  to have your meals prepared with olive oil instead of butter. · Order dishes made with marinara sauce or sauces made from olive oil. Avoid sauces made from cream or mayonnaise. · Choose whole-grain breads, whole wheat pasta and pizza crust, brown rice, beans, and lentils. · Cut back on butter or margarine on bread. Instead, you can dip your bread in a small amount of olive oil. · Ask for a side salad or grilled vegetables instead of french fries or chips. Where can you learn more? Go to http://leandra-giovanni.info/  Enter O407 in the search box to learn more about \"Learning About the Mediterranean Diet. \"  Current as of: December 17, 2020               Content Version: 12.8  © 3610-2010 [employer]. Care instructions adapted under license by [employer] (which disclaims liability or warranty for this information). If you have questions about a medical condition or this instruction, always ask your healthcare professional. [address] any warranty or liability for your use of this information.          Body Mass Index: Care Instructions  Your Care Instructions     Body mass index (BMI) can help you see if your weight is raising your risk for health problems. It uses a formula to compare how much you weigh with how tall you are. · A BMI lower than 18.5 is considered underweight. · A BMI between 18.5 and 24.9 is considered healthy. · A BMI between 25 and 29.9 is considered overweight. A BMI of 30 or higher is considered obese. If your BMI is in the normal range, it means that you have a lower risk for weight-related health problems. If your BMI is in the overweight or obese range, you may be at increased risk for weight-related health problems, such as high blood pressure, heart disease, stroke, arthritis or joint pain, and diabetes. If your BMI is in the underweight range, you may be at increased risk for health problems such as fatigue, lower protection (immunity) against illness, muscle loss, bone loss, hair loss, and hormone problems. BMI is just one measure of your risk for weight-related health problems. You may be at higher risk for health problems if you are not active, you eat an unhealthy diet, or you drink too much alcohol or use tobacco products. Follow-up care is a key part of your treatment and safety. Be sure to make and go to all appointments, and call your doctor if you are having problems. It's also a good idea to know your test results and keep a list of the medicines you take. How can you care for yourself at home? · Practice healthy eating habits. This includes eating plenty of fruits, vegetables, whole grains, lean protein, and low-fat dairy. · If your doctor recommends it, get more exercise. Walking is a good choice. Bit by bit, increase the amount you walk every day. Try for at least 30 minutes on most days of the week. · Do not smoke. Smoking can increase your risk for health problems. If you need help quitting, talk to your doctor about stop-smoking programs and medicines. These can increase your chances of quitting for good.   · Limit alcohol to 2 drinks a day for men and 1 drink a day for women. Too much alcohol can cause health problems. If you have a BMI higher than 25  · Your doctor may do other tests to check your risk for weight-related health problems. This may include measuring the distance around your waist. A waist measurement of more than 40 inches in men or 35 inches in women can increase the risk of weight-related health problems. · Talk with your doctor about steps you can take to stay healthy or improve your health. You may need to make lifestyle changes to lose weight and stay healthy, such as changing your diet and getting regular exercise. If you have a BMI lower than 18.5  · Your doctor may do other tests to check your risk for health problems. · Talk with your doctor about steps you can take to stay healthy or improve your health. You may need to make lifestyle changes to gain or maintain weight and stay healthy, such as getting more healthy foods in your diet and doing exercises to build muscle. Where can you learn more? Go to http://www.anderson.com/  Enter S176 in the search box to learn more about \"Body Mass Index: Care Instructions. \"  Current as of: September 23, 2020               Content Version: 12.8  © 0830-8245 Healthwise, Incorporated. Care instructions adapted under license by [employer] (which disclaims liability or warranty for this information). If you have questions about a medical condition or this instruction, always ask your healthcare professional. [address] any warranty or liability for your use of this information.

## 2021-08-02 DIAGNOSIS — I10 ESSENTIAL HYPERTENSION WITH GOAL BLOOD PRESSURE LESS THAN 140/90: ICD-10-CM

## 2021-08-02 RX ORDER — CARVEDILOL 6.25 MG/1
TABLET ORAL
Qty: 60 TABLET | Refills: 6 | Status: SHIPPED | OUTPATIENT
Start: 2021-08-02 | End: 2022-01-05 | Stop reason: SDUPTHER

## 2021-09-15 ENCOUNTER — OFFICE VISIT (OUTPATIENT)
Dept: CARDIOLOGY CLINIC | Age: 26
End: 2021-09-15
Payer: MEDICAID

## 2021-09-15 VITALS
DIASTOLIC BLOOD PRESSURE: 80 MMHG | OXYGEN SATURATION: 97 % | SYSTOLIC BLOOD PRESSURE: 116 MMHG | HEIGHT: 69 IN | WEIGHT: 315 LBS | HEART RATE: 68 BPM | BODY MASS INDEX: 46.65 KG/M2

## 2021-09-15 DIAGNOSIS — E78.5 HYPERLIPIDEMIA, UNSPECIFIED HYPERLIPIDEMIA TYPE: ICD-10-CM

## 2021-09-15 DIAGNOSIS — E66.01 MORBID OBESITY (HCC): ICD-10-CM

## 2021-09-15 DIAGNOSIS — I10 ESSENTIAL HYPERTENSION WITH GOAL BLOOD PRESSURE LESS THAN 140/90: Primary | ICD-10-CM

## 2021-09-15 DIAGNOSIS — I42.9 CARDIOMYOPATHY, UNSPECIFIED TYPE (HCC): ICD-10-CM

## 2021-09-15 PROCEDURE — 99214 OFFICE O/P EST MOD 30 MIN: CPT | Performed by: INTERNAL MEDICINE

## 2021-09-15 NOTE — PROGRESS NOTES
HISTORY OF PRESENT ILLNESS  Ernesto Skiff is a 31 y.o. male. Cardiomyopathy  The history is provided by the patient. This is a chronic problem. The problem has been resolved. Associated symptoms include shortness of breath. Pertinent negatives include no chest pain, no abdominal pain and no headaches. Hypertension  The history is provided by the patient. This is a chronic problem. The problem has not changed since onset. Associated symptoms include shortness of breath. Pertinent negatives include no chest pain, no abdominal pain and no headaches. Shortness of Breath  The history is provided by the patient. This is a recurrent problem. The problem occurs intermittently. The problem has been rapidly improving. Pertinent negatives include no fever, no headaches, no cough, no sputum production, no hemoptysis, no wheezing, no PND, no orthopnea, no chest pain, no vomiting, no abdominal pain, no rash, no leg swelling and no claudication. Follow-up  Associated symptoms include shortness of breath. Pertinent negatives include no chest pain, no abdominal pain and no headaches. Review of Systems   Constitutional: Negative for chills and fever. HENT: Negative for nosebleeds. Eyes: Negative for blurred vision and double vision. Respiratory: Positive for shortness of breath. Negative for cough, hemoptysis, sputum production and wheezing. Cardiovascular: Negative for chest pain, palpitations, orthopnea, claudication, leg swelling and PND. Gastrointestinal: Negative for abdominal pain, heartburn, nausea and vomiting. Musculoskeletal: Negative for myalgias. Skin: Negative for rash. Neurological: Negative for dizziness, weakness and headaches. Endo/Heme/Allergies: Does not bruise/bleed easily.      Family History   Problem Relation Age of Onset    Diabetes Mother     Hypertension Mother     Heart Attack Father 48    Hypertension Sister     Cancer Maternal Aunt     Stroke Maternal Grandmother  Stroke Maternal Grandfather     Heart Surgery Neg Hx        Past Medical History:   Diagnosis Date    Asthma 5/5/2014    possible asthma r/o cardiac etiology h/o chest trauma as a child     Hypertension     Knee pain, right     Other specified cardiac dysrhythmias(427.89) 5/5/2014    marked sinus bradycardia     Palpitations 5/5/2014    r/o arrythmia     Shortness of breath 5/5/2014    possible asthma r/o cardiac etiology h/o chest trauma as a child        Past Surgical History:   Procedure Laterality Date    HX ADENOIDECTOMY         Social History     Tobacco Use    Smoking status: Never Smoker    Smokeless tobacco: Never Used   Substance Use Topics    Alcohol use: No     Alcohol/week: 0.0 standard drinks       Allergies   Allergen Reactions    Penicillins Unable to Obtain           Visit Vitals  /80 (BP 1 Location: Left upper arm, BP Patient Position: Sitting, BP Cuff Size: Large adult)   Pulse 68   Ht 5' 9\" (1.753 m)   Wt (!) 164.7 kg (363 lb)   SpO2 97%   BMI 53.61 kg/m²         Physical Exam  Constitutional:       Appearance: He is well-developed. HENT:      Head: Normocephalic and atraumatic. Eyes:      Conjunctiva/sclera: Conjunctivae normal.   Neck:      Thyroid: No thyromegaly. Vascular: No JVD. Trachea: No tracheal deviation. Cardiovascular:      Rate and Rhythm: Normal rate and regular rhythm. Heart sounds: Normal heart sounds. No murmur heard. No friction rub. No gallop. Pulmonary:      Effort: No respiratory distress. Breath sounds: Normal breath sounds. No wheezing or rales. Chest:      Chest wall: No tenderness. Abdominal:      Palpations: Abdomen is soft. Tenderness: There is no abdominal tenderness. Musculoskeletal:      Cervical back: Neck supple. Skin:     General: Skin is warm and dry. Neurological:      Mental Status: He is alert and oriented to person, place, and time.        Mr. Rubén Benitez has a reminder for a \"due or due soon\" health maintenance. I have asked that he contact his primary care provider for follow-up on this health maintenance. No flowsheet data found. I have personally reviewed patient's records available from hospital and other providers and incorporated findings in patient care. SUMMARY:echo:12/2015  Left ventricle: Systolic function was normal. Ejection fraction was  estimated to be 55 %. There were no regional wall motion abnormalities. Left ventricular diastolic function parameters were normal.    COMPARISONS:  Comparison was made with the previous study of 02-Jun-2015. LV overall  function has increased from 45 % to 55 %  Interpretation Summary 8/2019       · Left Ventricle: Normal cavity size, systolic function (ejection fraction normal) and diastolic function. Moderate concentric hypertrophy. Estimated left ventricular ejection fraction is 56 - 60%. No regional wall motion abnormality noted. · Right Ventricle: Normal right ventricular size and function. · No hemodynamically significant valvular pathology. I Have personally reviewed recent relevant labs available and discussed with patient  6/2019BMP and lipids  6/2021  CBC, BMP, EKG, x-ray  Assessment         ICD-10-CM ICD-9-CM    1. Essential hypertension with goal blood pressure less than 140/90  I10 401.9     Stable continue treatment monitor   2. Cardiomyopathy, unspecified type ([address]  I42.9 425.4     Stable continue current treatment   3. Hyperlipidemia, unspecified hyperlipidemia type  E78.5 272.4     Continue treatment lab with PCP   4. Morbid obesity ([address]  E66.01 278.01     Continue dietary modification and exercise monitor   2/2021  Patient seen for follow-up. Cardiomyopathy stable with improving function tolerating beta-blocker and lisinopril. Continue statin check labs. Continue diet and exercise  9/2021  Cardiac status stable. ER visit in 6/2021 unremarkable labs and EKG. Blood pressure controlled.   Has significant weight loss with dieting continue treatment    No orders of the defined types were placed in this encounter. Follow-up and Dispositions    · Return in about 6 months (around 3/15/2022).

## 2021-09-15 NOTE — PROGRESS NOTES
1. Have you been to the ER, urgent care clinic since your last visit? Hospitalized since your last visit? Yes Where: [address] [facility]    2. Have you seen or consulted any other health care providers outside of the [address] [place] [address] since your last visit? Include any pap smears or colon screening. Yes Where: PCP Routine     3. Do you need any refills today?   No

## 2021-10-26 ENCOUNTER — OFFICE VISIT (OUTPATIENT)
Dept: FAMILY MEDICINE CLINIC | Age: 26
End: 2021-10-26
Payer: MEDICAID

## 2021-10-26 VITALS
SYSTOLIC BLOOD PRESSURE: 126 MMHG | HEIGHT: 69 IN | RESPIRATION RATE: 16 BRPM | HEART RATE: 78 BPM | DIASTOLIC BLOOD PRESSURE: 72 MMHG | BODY MASS INDEX: 46.65 KG/M2 | WEIGHT: 315 LBS | TEMPERATURE: 97.1 F | OXYGEN SATURATION: 97 %

## 2021-10-26 DIAGNOSIS — E78.5 HYPERLIPIDEMIA, UNSPECIFIED HYPERLIPIDEMIA TYPE: ICD-10-CM

## 2021-10-26 DIAGNOSIS — I10 ESSENTIAL HYPERTENSION WITH GOAL BLOOD PRESSURE LESS THAN 140/90: Primary | ICD-10-CM

## 2021-10-26 DIAGNOSIS — E66.01 MORBID OBESITY (HCC): ICD-10-CM

## 2021-10-26 DIAGNOSIS — Z23 NEEDS FLU SHOT: ICD-10-CM

## 2021-10-26 DIAGNOSIS — M79.2 NEUROPATHIC PAIN: ICD-10-CM

## 2021-10-26 PROCEDURE — 99214 OFFICE O/P EST MOD 30 MIN: CPT | Performed by: EMERGENCY MEDICINE

## 2021-10-26 PROCEDURE — 90686 IIV4 VACC NO PRSV 0.5 ML IM: CPT | Performed by: EMERGENCY MEDICINE

## 2021-10-26 RX ORDER — GABAPENTIN 100 MG/1
CAPSULE ORAL
Qty: 90 CAPSULE | Refills: 0 | Status: SHIPPED | OUTPATIENT
Start: 2021-10-26 | End: 2022-01-05 | Stop reason: SDUPTHER

## 2021-10-26 NOTE — PROGRESS NOTES
10/26/21  1:25 PM    ICD-10-CM ICD-9-CM    1. Needs flu shot  Z23 V04.81 INFLUENZA VIRUS VAC QUAD,SPLIT,PRESV FREE SYRINGE IM   2. Neuropathic pain  M79.2 729.2 gabapentin (NEURONTIN) 100 mg capsule   3. Essential hypertension with goal blood pressure less than 140/90  I10 401.9 CBC WITH AUTOMATED DIFF      METABOLIC PANEL, BASIC   4. Hyperlipidemia, unspecified hyperlipidemia type  E78.5 272.4 LIPID PANEL         Assessment and plan  Essential hypertension, doing well continue present regimen. 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 Will trial low-dose gabapentin at nighttime 200 mg to start with. Should improve his weight decreases also. Patient has an unhealthy weight. He is going to try over-the-counter medication first and if is not improving will trial Adipex. History of hyperlipidemia doing well in March 2020. Will recheck in 1 year. History of cardiomyopathy, stable follow along with cardiology. Lab results and schedule of future lab studies reviewed with patient  All questions were answered and understood. Health Maintenance Due   Topic Date Due    Hepatitis C Screening  Never done    COVID-19 Vaccine (1) Never done    DTaP/Tdap/Td series (2 - Td or Tdap) 01/01/2015    Flu Vaccine (1) 09/01/2021   Patient has previously received the Covid vaccines      Subjective:   Ynes Velazquez is a 31 y.o. male has Palpitations, Shortness of breath, Other specified cardiac dysrhythmias(427.89), Cardiomyopathy ([address] Essential hypertension with goal blood pressure less than 140/90, Morbid obesity ([address] Seborrheic dermatitis, Vitamin D deficiency, Moderate persistent asthma without complication, Bilateral low back pain without sciatica, and Hyperlipidemia on their problem list.. No chief complaint on file. Seen in follow-up 9/15/2021 by cardiology for cardiomyopathy. He was felt to be stable and improving.   He was experiencing significant weight loss with dieting. The patient was seen for essential hypertension, good control continue present regimen. Hyperlipidemia continue present regimen. Morbid obesity, weight is decreasing, continue present management. We will screen for hepatitis C. Right lateral thigh pain differential diagnosis includes sciatica, meralgia paresthetica, lateral tibial band syndrome or other etiologies. At this time we will just use Tylenol or Motrin. He is able to walk at least half an hour without difficulty. If not improving or symptoms worsen we will initiate work-up. Lab results and schedule of future lab studies reviewed with patient  Diagnostic and radiologic results and the schedule of future studies were reviewed with the patient  reviewed diet, exercise and weight control  All questions were answered and understood. He was seen by virtual visit May 2020. His hypertension was well controlled. His hyperlipidemia was in good control. The patient had an unhealthy weight which was improving post increased exercise. He was to be considered for bariatric surgery if not improving. Chronic bilateral low back pain associated with neck pain treated with Aleve. The patient had an episode of acute pharyngitis. ED chest pain  Hypertension  The patient has no headaches, visual changes, chest pain or pressure,dyspnea, orthopnea, or PND. There is no problem with the medication. Hyperlipidemia   The patient denies any myalgias or weakness. No abdominal discomfort admitted to. The patient denies any difficulty with or side effects from the medication. BP Readings from Last 3 Encounters:   09/15/21 116/80   07/20/21 109/76   06/18/21 (!) 116/58     Right leg pain  Continued symptoms. Is located in the right lateral thigh region. Is described as an aching sensation. The patient is able to move the upper and lower extremities normally with apparent normal strength and ambulate well.   No obvious difficulty with balance is noted. Provoked by prolonged walking. He walked a mile and a half here to the visit. It is relieved by rest.  He has to walk several miles before it begins. It does not always happen. There is no change in color in the area. He is taken Alevefor relief of the symptoms without success. There is no history of peripheral vascular disease. The patient is overweight. Overweight  The patient states that the weight has slowly decreased. Patient's diet has improved, he states. The patient denies edema of the lower extremities. Aggravating factors include having to make his own meals. .  Associated symptoms include no joint aches. No heat or cold intolerance. .  body mass index is unknown because there is no height or weight on file. Wt Readings from Last 3 Encounters:   10/26/21 (!) 359 lb (162.8 kg)   09/15/21 (!) 363 lb (164.7 kg)   07/20/21 (!) 365 lb (165.6 kg)     Cardiomyopathy   The patient denies any chest pain pressure or palpitations. No orthopnea is admitted to. He was lseen by cardiology and this is considered a chronic problem that is improving. Associated symptoms include shortness of breath. Pertinent negatives include no chest pain, no abdominal pain and no headaches. His last echocardiogram was from August 2019 his ejection fraction had increased to 55 to 60%. No regional wall motion abnormality was noted. There was normal right ventricular size and function. He was felt to be improved.       has Palpitations, Shortness of breath, Other specified cardiac dysrhythmias(427.89), Cardiomyopathy ([address] Essential hypertension with goal blood pressure less than 140/90, Morbid obesity ([address] Seborrheic dermatitis, Vitamin D deficiency, Moderate persistent asthma without complication, Bilateral low back pain without sciatica, and Hyperlipidemia on their problem list.    Past Surgical History:   Procedure Laterality Date    HX ADENOIDECTOMY        reports that he has never smoked. He has never used smokeless tobacco. He reports that he does not drink alcohol and does not use drugs. family history includes Cancer in his maternal aunt; Diabetes in his mother; Heart Attack (age of onset: 48) in his father; Hypertension in his mother and sister; Stroke in his maternal grandfather and maternal grandmother. Review of Systems   Constitutional: Negative for chills and fever. HENT: Positive for sore throat. Negative for congestion. Respiratory: Negative for shortness of breath and wheezing. Musculoskeletal: Positive for back pain, joint pain, myalgias and neck pain. Negative for falls. Right thigh pain on walking  Better at rest  Can walk 20 minutes   Neurological: Positive for headaches. Psychiatric/Behavioral: The patient is not nervous/anxious. Visit Vitals  /72 (BP 1 Location: Right arm, BP Patient Position: Sitting, BP Cuff Size: Thigh)   Pulse 78   Temp 97.1 °F (36.2 °C) (Temporal)   Resp 16   Ht 5' 9\" (1.753 m)   Wt (!) 359 lb (162.8 kg)   SpO2 97%   BMI 53.02 kg/m²     Wt Readings from Last 3 Encounters:   09/15/21 (!) 363 lb (164.7 kg)   07/20/21 (!) 365 lb (165.6 kg)   05/14/21 (!) 381 lb (172.8 kg)         Physical Exam  Vitals and nursing note reviewed. Constitutional:       General: He is not in acute distress. Appearance: He is well-developed. HENT:      Right Ear: External ear normal.      Left Ear: External ear normal.      Nose: Nose normal.   Eyes:      Pupils: Pupils are equal, round, and reactive to light. Neck:      Thyroid: No thyromegaly. Comments: Carotid bruit absent  Cardiovascular:      Rate and Rhythm: Normal rate and regular rhythm. Heart sounds: No murmur heard. No friction rub. No gallop. Pulmonary:      Effort: Pulmonary effort is normal. No respiratory distress. Breath sounds: Normal breath sounds. Abdominal:      General: There is no distension. Palpations: There is no mass.       Tenderness: There is no abdominal tenderness. Musculoskeletal:         General: No tenderness (Right side of the neck aggravated by rotation of the neck to the right and right lateral extension). Cervical back: Neck supple. Lymphadenopathy:      Cervical: No cervical adenopathy. Skin:     General: Skin is warm and dry. Neurological:      Mental Status: He is alert and oriented to person, place, and time. Sensory: Sensory deficit (.Monofilament test on the right lateral thigh is diminished) present. Psychiatric:      Comments: Nice gentleman, likes to tell jokes            Results for orders placed or performed during the hospital encounter of 06/18/21   CBC WITH AUTOMATED DIFF   Result Value Ref Range    WBC 13.1 4.6 - 13.2 K/uL    RBC 5.15 4.35 - 5.65 M/uL    HGB 14.6 13.0 - 16.0 g/dL    HCT 43.3 36.0 - 48.0 %    MCV 84.1 74.0 - 97.0 FL    MCH 28.3 24.0 - 34.0 PG    MCHC 33.7 31.0 - 37.0 g/dL    RDW 13.4 11.6 - 14.5 %    PLATELET 569 (H) 596 - 420 K/uL    MPV 10.7 9.2 - 11.8 FL    NEUTROPHILS 70 40 - 73 %    LYMPHOCYTES 22 21 - 52 %    MONOCYTES 7 3 - 10 %    EOSINOPHILS 0 0 - 5 %    BASOPHILS 0 0 - 2 %    ABS. NEUTROPHILS 9.2 (H) 1.8 - 8.0 K/UL    ABS. LYMPHOCYTES 2.9 0.9 - 3.6 K/UL    ABS. MONOCYTES 0.9 0.05 - 1.2 K/UL    ABS. EOSINOPHILS 0.0 0.0 - 0.4 K/UL    ABS.  BASOPHILS 0.0 0.0 - 0.1 K/UL    DF AUTOMATED     CARDIAC PANEL,(CK, CKMB & TROPONIN)   Result Value Ref Range    CK - MB <1.0 <3.6 ng/ml    CK-MB Index  0.0 - 4.0 %     CALCULATION NOT PERFORMED WHEN RESULT IS BELOW LINEAR LIMIT     39 - 308 U/L    Troponin-I, QT <0.02 0.0 - 5.619 NG/ML   METABOLIC PANEL, COMPREHENSIVE   Result Value Ref Range    Sodium 139 136 - 145 mmol/L    Potassium 3.5 3.5 - 5.5 mmol/L    Chloride 108 100 - 111 mmol/L    CO2 27 21 - 32 mmol/L    Anion gap 4 3.0 - 18 mmol/L    Glucose 111 (H) 74 - 99 mg/dL    BUN 17 7.0 - 18 MG/DL    Creatinine 1.01 0.6 - 1.3 MG/DL    BUN/Creatinine ratio 17 12 - 20      GFR est AA >60 >60 ml/min/1.73m2    GFR est non-AA >60 >60 ml/min/1.73m2    Calcium 9.0 8.5 - 10.1 MG/DL    Bilirubin, total 0.4 0.2 - 1.0 MG/DL    ALT (SGPT) 106 (H) 16 - 61 U/L    AST (SGOT) 40 (H) 10 - 38 U/L    Alk. phosphatase 107 45 - 117 U/L    Protein, total 8.5 (H) 6.4 - 8.2 g/dL    Albumin 3.8 3.4 - 5.0 g/dL    Globulin 4.7 (H) 2.0 - 4.0 g/dL    A-G Ratio 0.8 0.8 - 1.7     TROPONIN I   Result Value Ref Range    Troponin-I, QT <0.02 0.0 - 0.045 NG/ML   EKG, 12 LEAD, INITIAL   Result Value Ref Range    Ventricular Rate 105 BPM    Atrial Rate 105 BPM    P-R Interval 172 ms    QRS Duration 86 ms    Q-T Interval 342 ms    QTC Calculation (Bezet) 452 ms    Calculated P Axis 31 degrees    Calculated R Axis 54 degrees    Calculated T Axis 32 degrees    Diagnosis       Sinus tachycardia  Otherwise normal ECG  When compared with ECG of 25-JAN-2020 11:10,  No significant change was found  Confirmed by Harsh Hill M.D., [address] [place] [address] ([id number]) on 6/20/2021 10:05:02 PM           We discussed the expected course, resolution and complications of the diagnosis(es) in detail. Medication risks, benefits, costs, interactions, and alternatives were discussed as indicated. I advised him to contact the office if his condition worsens, changes or fails to improve as anticipated. He expressed understanding with the diagnosis(es) and plan. This note was done with the assistance of dragon speech software.   Some inadvertent errors or omissions may be present

## 2021-10-26 NOTE — PATIENT INSTRUCTIONS
Learning About High Blood Pressure  What is high blood pressure? Blood pressure is a measure of how hard the blood pushes against the walls of your arteries. It's normal for blood pressure to go up and down throughout the day. But if it stays up, you have high blood pressure. Another name for high blood pressure is hypertension. Two numbers tell you your blood pressure. The first number is the systolic pressure (top number). It shows how hard the blood pushes when your heart is pumping. The second number is the diastolic pressure (bottom number). It shows how hard the blood pushes between heartbeats, when your heart is relaxed and filling with blood. Your doctor will give you a goal for your blood pressure based on your health and your age. High blood pressure (hypertension) means that the top number stays high, or the bottom number stays high, or both. High blood pressure increases the risk of stroke, heart attack, and other problems. What happens when you have high blood pressure? · Blood flows through your arteries with too much force. Over time, this can damage the heart and the walls of your arteries. But you can't feel it. High blood pressure usually doesn't cause symptoms. · High blood pressure makes your heart work harder. And that can lead to heart failure, which means your heart doesn't pump as much blood as your body needs. · Fat and calcium start to build up in your arteries. This buildup is called hardening of the arteries. It can cause many problems including a heart attack and stroke. · Arteries also carry blood and oxygen to organs like your eyes, kidneys, and brain. If high blood pressure damages those arteries, it can lead to vision loss, kidney disease, stroke, and a higher risk of dementia. How can you prevent high blood pressure? · Stay at a healthy weight. · Try to limit how much sodium you eat to less than 2,300 milligrams (mg) a day.  If you limit your sodium to 1,500 mg a day, you can lower your blood pressure even more. ? Buy foods that are labeled \"unsalted,\" \"sodium-free,\" or \"low-sodium. \" Foods labeled \"reduced-sodium\" and \"light sodium\" may still have too much sodium. ? Flavor your food with garlic, lemon juice, onion, vinegar, herbs, and spices instead of salt. Do not use soy sauce, steak sauce, onion salt, garlic salt, mustard, or ketchup on your food. ? Use less salt (or none) when recipes call for it. You can often use half the salt a recipe calls for without losing flavor. · Be physically active. Get at least 30 minutes of exercise on most days of the week. Walking is a good choice. You also may want to do other activities, such as running, swimming, cycling, or playing tennis or team sports. · Limit alcohol to 2 drinks a day for men and 1 drink a day for women. · Eat plenty of fruits, vegetables, and low-fat dairy products. Eat less saturated and total fats. How is high blood pressure treated? · Your doctor will suggest making lifestyle changes to help your heart. For example, your doctor may ask you to eat healthy foods, quit smoking, lose extra weight, and be more active. · If lifestyle changes don't help enough, your doctor may recommend that you take medicine. · When blood pressure is very high, medicines are needed to lower it. Follow-up care is a key part of your treatment and safety. Be sure to make and go to all appointments, and call your doctor if you are having problems. It's also a good idea to know your test results and keep a list of the medicines you take. Where can you learn more? Go to http://www.[employer]hti[employer].com/  Enter P501 in the search box to learn more about \"Learning About High Blood Pressure. \"  Current as of: April 29, 2021               Content Version: 13.0  © 7104-5410 Healthwise, Incorporated.    Care instructions adapted under license by [facility] (which disclaims liability or warranty for this information). If you have questions about a medical condition or this instruction, always ask your healthcare professional. [address] any warranty or liability for your use of this information. Learning About the [address] Diet  What is the Mediterranean diet? The Mediterranean diet is a style of eating rather than a diet plan. It features foods eaten in [place], [place], [place], and other countries along the [facility]. It emphasizes eating foods like fish, fruits, vegetables, beans, high-fiber breads and whole grains, nuts, and olive oil. This style of eating includes limited red meat, cheese, and sweets. Why choose the Mediterranean diet? A Mediterranean-style diet may improve heart health. It contains more fat than other heart-healthy diets. But the fats are mainly from nuts, unsaturated oils (such as fish oils and olive oil), and certain nut or seed oils (such as canola, soybean, or flaxseed oil). These fats may help protect the heart and blood vessels. How can you get started on the Mediterranean diet? Here are some things you can do to switch to a more Mediterranean way of eating. What to eat  · Eat a variety of fruits and vegetables each day, such as grapes, blueberries, tomatoes, broccoli, peppers, figs, olives, spinach, eggplant, beans, lentils, and chickpeas. · Eat a variety of whole-grain foods each day, such as oats, brown rice, and whole wheat bread, pasta, and couscous. · Eat fish at least 2 times a week. Try tuna, salmon, mackerel, lake trout, herring, or sardines. · Eat moderate amounts of low-fat dairy products, such as milk, cheese, or yogurt. · Eat moderate amounts of poultry and eggs. · Choose healthy (unsaturated) fats, such as nuts, olive oil, and certain nut or seed oils like canola, soybean, and flaxseed. · Limit unhealthy (saturated) fats, such as butter, palm oil, and coconut oil.  And limit fats found in animal products, such as meat and dairy products made with whole milk. Try to eat red meat only a few times a month in very small amounts. · Limit sweets and desserts to only a few times a week. This includes sugar-sweetened drinks like soda. The Mediterranean diet may also include red wine with your meal1 glass each day for women and up to 2 glasses a day for men. Tips for eating at home  · Use herbs, spices, garlic, lemon zest, and citrus juice instead of salt to add flavor to foods. · Add avocado slices to your sandwich instead of ortega. · Have fish for lunch or dinner instead of red meat. Brush the fish with olive oil, and broil or grill it. · Sprinkle your salad with seeds or nuts instead of cheese. · Cook with olive or canola oil instead of butter or oils that are high in saturated fat. · Switch from 2% milk or whole milk to 1% or fat-free milk. · Dip raw vegetables in a vinaigrette dressing or hummus instead of dips made from mayonnaise or sour cream.  · Have a piece of fruit for dessert instead of a piece of cake. Try baked apples, or have some dried fruit. Tips for eating out  · Try broiled, grilled, baked, or poached fish instead of having it fried or breaded. · Ask your  to have your meals prepared with olive oil instead of butter. · Order dishes made with marinara sauce or sauces made from olive oil. Avoid sauces made from cream or mayonnaise. · Choose whole-grain breads, whole wheat pasta and pizza crust, brown rice, beans, and lentils. · Cut back on butter or margarine on bread. Instead, you can dip your bread in a small amount of olive oil. · Ask for a side salad or grilled vegetables instead of french fries or chips. Where can you learn more? Go to http://www.[employer].com/  Enter O407 in the search box to learn more about \"Learning About the Mediterranean Diet. \"  Current as of: December 17, 2020               Content Version: 13.0  © 4369-5963 Healthwise Incorporated. Care instructions adapted under license by [employer] (which disclaims liability or warranty for this information). If you have questions about a medical condition or this instruction, always ask your healthcare professional. [facility] [address] any warranty or liability for your use of this information. Hyperlipidemia: After Your Visit  Your Care Instructions  Hyperlipidemia is too much fat in your blood. The body has several kinds of fat, including cholesterol and triglycerides. Your body needs fat for many things, such as making new cells. But too much fat in your blood increases your chances of having a heart attack or stroke. You may be able to lower your cholesterol and triglycerides with a heart-healthy diet, exercise, and if needed, medicine. Your doctor may want you to try lifestyle changes first to see whether they lower the fat in your blood. You may need to take medicine if lifestyle changes do not lower the fat in your blood enough. Follow-up care is a key part of your treatment and safety. Be sure to make and go to all appointments, and call your doctor if you are having problems. Its also a good idea to know your test results and keep a list of the medicines you take. How can you care for yourself at home? Take your medicines  · Take your medicines exactly as prescribed. Call your doctor if you think you are having a problem with your medicine. · If you take medicine to lower your cholesterol, go to follow-up visits. You will need to have blood tests. · Do not take large doses of niacin, which is a B vitamin, while taking medicine called statins. It may increase the chance of muscle pain and liver problems. · Talk to your doctor about avoiding grapefruit juice if you are taking statins. Grapefruit juice can raise the level of this medicine in your blood. This could increase side effects.   Eat more fruits, vegetables, and fiber  · Fruits and vegetables have lots of nutrients that help protect against heart disease, and they have littleif anyfat. Try to eat at least five servings a day. Dark green, deep orange, or yellow fruits and vegetables are healthy choices. · Keep carrots, celery, and other veggies handy for snacks. Buy fruit that is in season and store it where you can see it so that you will be tempted to eat it. Cook dishes that have a lot of veggies in them, such as stir-fries and soups. · Foods high in fiber may reduce your cholesterol and provide important vitamins and minerals. High-fiber foods include whole-grain cereals and breads, oatmeal, beans, brown rice, citrus fruits, and apples. · Buy whole-grain breads and cereals instead of white bread and pastries. Limit saturated fat  · Read food labels and try to avoid saturated fat and trans fat. They increase your risk of heart disease. · Use olive or canola oil when you cook. Try cholesterol-lowering spreads, such as Benecol or Take Control. · Bake, broil, grill, or steam foods instead of frying them. · Limit the amount of high-fat meats you eat, including hot dogs and sausages. Cut out all visible fat when you prepare meat. · Eat fish, skinless poultry, and soy products such as tofu instead of high-fat meats. Soybeans may be especially good for your heart. Eat at least two servings of fish a week. Certain fish, such as salmon, contain omega-3 fatty acids, which may help reduce your risk of heart attack. · Choose low-fat or fat-free milk and dairy products. Get exercise, limit alcohol, and quit smoking  · Get more exercise. Work with your doctor to set up an exercise program. Even if you can do only a small amount, exercise will help you get stronger, have more energy, and manage your weight and your stress. Walking is an easy way to get exercise. Gradually increase the amount you walk every day. Aim for at least 30 minutes on most days of the week.  You also may want to swim, bike, or do other activities. · Limit alcohol to no more than 2 drinks a day for men and 1 drink a day for women. · Do not smoke. If you need help quitting, talk to your doctor about stop-smoking programs and medicines. These can increase your chances of quitting for good. When should you call for help? Call 911 anytime you think you may need emergency care. For example, call if:  · You have symptoms of a heart attack. These may include:  ¨ Chest pain or pressure, or a strange feeling in the chest.  ¨ Sweating. ¨ Shortness of breath. ¨ Nausea or vomiting. ¨ Pain, pressure, or a strange feeling in the back, neck, jaw, or upper belly or in one or both shoulders or arms. ¨ Lightheadedness or sudden weakness. ¨ A fast or irregular heartbeat. After you call 911, the  may tell you to chew 1 adult-strength or 2 to 4 low-dose aspirin. Wait for an ambulance. Do not try to drive yourself. · You have signs of a stroke. These may include:  ¨ Sudden numbness, paralysis, or weakness in your face, arm, or leg, especially on only one side of your body. ¨ New problems with walking or balance. ¨ Sudden vision changes. ¨ Drooling or slurred speech. ¨ New problems speaking or understanding simple statements, or feeling confused. ¨ A sudden, severe headache that is different from past headaches. · You passed out (lost consciousness). Call your doctor now or seek immediate medical care if:  · You have muscle pain or weakness. Watch closely for changes in your health, and be sure to contact your doctor if:  · You are very tired. · You have an upset stomach, gas, constipation, or belly pain or cramps. Where can you learn more? Go to [employer]  Enter C406 in the search box to learn more about \"Hyperlipidemia: After Your Visit. \"   © 7702-3946 Healthwise, Incorporated. Care instructions adapted under license by [facility] [employer] (which disclaims liability or warranty for this information). This care instruction is for use with your licensed healthcare professional. If you have questions about a medical condition or this instruction, always ask your healthcare professional. [address] any warranty or liability for your use of this information. Content Version: [id number]; Last Revised: October 13, 2011                   Meralgia Paresthetica: Care Instructions  Your Care Instructions  Meralgia paresthetica (say \"muh-RAL-juh [id number]-mux-UKEL-ick-uh\") is pain and numbness in the outer part of your thigh. The pain might get worse after you walk or stand for a long time. This pain and numbness occur when a nerve in your thigh is pinched (compressed). Sometimes the problem is caused by wearing tight clothing or being overweight. Most of the time the problem goes away on its own in a few months. Lowering any pressure on the thigh area may help. Wear loose clothes, and lose weight if you need to. Follow-up care is a key part of your treatment and safety. Be sure to make and go to all appointments, and call your doctor if you are having problems. It's also a good idea to know your test results and keep a list of the medicines you take. How can you care for yourself at home? · Most times the problem gets better on its own. Try wearing loose clothing to see if this helps. · Lose weight if you need to. Talk with your doctor if you need help. When should you call for help? Watch closely for changes in your health, and be sure to contact your doctor if:    · You have new symptoms, such as pain that gets worse or new numbness in your thigh.     · You do not get better as expected. Where can you learn more? Go to http://www.gray.com/  Enter C468 in the search box to learn more about \"Meralgia Paresthetica: Care Instructions. \"  Current as of: April 8, 2021               Content Version: 13.0  © 2006-2021 Healthwise, Incorporated.    Care instructions adapted under license by [employer] (which disclaims liability or warranty for this information). If you have questions about a medical condition or this instruction, always ask your healthcare professional. [address] any warranty or liability for your use of this information.

## 2021-10-26 NOTE — PROGRESS NOTES
Catrachito Mitchell is a 31 y.o. male that is here for a No chief complaint on file. 1. Have you been to the ER, urgent care clinic since your last visit? Hospitalized since your last visit?no    2. Have you seen or consulted any other health care providers outside of the [address] [place] [address] since your last visit? Include any pap smears or colon screening.  no      Health Maintenance reviewed - yes      Upcoming Appts  no      VORB: No orders of the defined types were placed in this encounter.   Jem Espinal MD/ Xochitl Valderrama MA

## 2022-01-05 DIAGNOSIS — E78.5 HYPERLIPIDEMIA, UNSPECIFIED HYPERLIPIDEMIA TYPE: ICD-10-CM

## 2022-01-05 DIAGNOSIS — L21.9 SEBORRHEIC DERMATITIS: ICD-10-CM

## 2022-01-05 DIAGNOSIS — M79.2 NEUROPATHIC PAIN: ICD-10-CM

## 2022-01-05 DIAGNOSIS — I10 ESSENTIAL HYPERTENSION WITH GOAL BLOOD PRESSURE LESS THAN 140/90: ICD-10-CM

## 2022-01-11 ENCOUNTER — HOSPITAL ENCOUNTER (OUTPATIENT)
Dept: LAB | Age: 27
Discharge: HOME | End: 2022-01-11
Payer: MEDICAID

## 2022-01-11 DIAGNOSIS — Z11.59 ENCOUNTER FOR HEPATITIS C SCREENING TEST FOR LOW RISK PATIENT: ICD-10-CM

## 2022-01-11 DIAGNOSIS — I10 ESSENTIAL HYPERTENSION WITH GOAL BLOOD PRESSURE LESS THAN 140/90: ICD-10-CM

## 2022-01-11 LAB
ANION GAP SERPL CALC-SCNC: 3 MMOL/L (ref 3–18)
BASOPHILS # BLD: 0 K/UL (ref 0–0.1)
BASOPHILS NFR BLD: 0 % (ref 0–2)
BUN SERPL-MCNC: 18 MG/DL (ref 7–18)
BUN/CREAT SERPL: 21 (ref 12–20)
CALCIUM SERPL-MCNC: 9.1 MG/DL (ref 8.5–10.1)
CHLORIDE SERPL-SCNC: 107 MMOL/L (ref 100–111)
CO2 SERPL-SCNC: 29 MMOL/L (ref 21–32)
CREAT SERPL-MCNC: 0.84 MG/DL (ref 0.6–1.3)
DIFFERENTIAL METHOD BLD: ABNORMAL
EOSINOPHIL # BLD: 0.1 K/UL (ref 0–0.4)
EOSINOPHIL NFR BLD: 1 % (ref 0–5)
ERYTHROCYTE [DISTWIDTH] IN BLOOD BY AUTOMATED COUNT: 13.7 % (ref 11.6–14.5)
GLUCOSE SERPL-MCNC: 70 MG/DL (ref 74–99)
HCT VFR BLD AUTO: 41.4 % (ref 36–48)
HGB BLD-MCNC: 13.6 G/DL (ref 13–16)
IMM GRANULOCYTES # BLD AUTO: 0 K/UL (ref 0–0.04)
IMM GRANULOCYTES NFR BLD AUTO: 0 % (ref 0–0.5)
LYMPHOCYTES # BLD: 2.8 K/UL (ref 0.9–3.6)
LYMPHOCYTES NFR BLD: 46 % (ref 21–52)
MCH RBC QN AUTO: 28.9 PG (ref 24–34)
MCHC RBC AUTO-ENTMCNC: 32.9 G/DL (ref 31–37)
MCV RBC AUTO: 87.9 FL (ref 78–100)
MONOCYTES # BLD: 0.7 K/UL (ref 0.05–1.2)
MONOCYTES NFR BLD: 12 % (ref 3–10)
NEUTS SEG # BLD: 2.5 K/UL (ref 1.8–8)
NEUTS SEG NFR BLD: 41 % (ref 40–73)
NRBC # BLD: 0 K/UL (ref 0–0.01)
NRBC BLD-RTO: 0 PER 100 WBC
PLATELET # BLD AUTO: 353 K/UL (ref 135–420)
PMV BLD AUTO: 10.4 FL (ref 9.2–11.8)
POTASSIUM SERPL-SCNC: 4.6 MMOL/L (ref 3.5–5.5)
RBC # BLD AUTO: 4.71 M/UL (ref 4.35–5.65)
SODIUM SERPL-SCNC: 139 MMOL/L (ref 136–145)
WBC # BLD AUTO: 6.1 K/UL (ref 4.6–13.2)

## 2022-01-11 PROCEDURE — 80048 BASIC METABOLIC PNL TOTAL CA: CPT

## 2022-01-11 PROCEDURE — 36415 COLL VENOUS BLD VENIPUNCTURE: CPT

## 2022-01-11 PROCEDURE — 85025 COMPLETE CBC W/AUTO DIFF WBC: CPT

## 2022-01-11 PROCEDURE — 86803 HEPATITIS C AB TEST: CPT

## 2022-01-12 LAB
HCV AB SER IA-ACNC: 0.03 INDEX
HCV AB SERPL QL IA: NEGATIVE
HCV COMMENT,HCGAC: NORMAL

## 2022-01-22 RX ORDER — LISINOPRIL 2.5 MG/1
2.5 TABLET ORAL DAILY
Qty: 90 TABLET | Refills: 3 | Status: SHIPPED | OUTPATIENT
Start: 2022-01-22

## 2022-01-22 RX ORDER — KETOCONAZOLE 20 MG/ML
SHAMPOO TOPICAL
Qty: 120 ML | Refills: 11 | Status: SHIPPED | OUTPATIENT
Start: 2022-01-22

## 2022-01-22 RX ORDER — CARVEDILOL 6.25 MG/1
6.25 TABLET ORAL 2 TIMES DAILY
Qty: 180 TABLET | Refills: 3 | Status: SHIPPED | OUTPATIENT
Start: 2022-01-22

## 2022-01-22 RX ORDER — GABAPENTIN 100 MG/1
CAPSULE ORAL
Qty: 90 CAPSULE | Refills: 0 | Status: SHIPPED | OUTPATIENT
Start: 2022-01-22 | End: 2022-05-03 | Stop reason: SDUPTHER

## 2022-01-22 RX ORDER — GEMFIBROZIL 600 MG/1
600 TABLET, FILM COATED ORAL 2 TIMES DAILY
Qty: 180 TABLET | Refills: 3 | Status: SHIPPED | OUTPATIENT
Start: 2022-01-22

## 2022-01-24 NOTE — PROGRESS NOTES
ICD-10-CM ICD-9-CM    1. Essential hypertension with goal blood pressure less than 140/90  I10 401.9 CBC WITH AUTOMATED DIFF      METABOLIC PANEL, BASIC   2. Hyperlipidemia, unspecified hyperlipidemia type  E78.5 272.4 LIPID PANEL   3. Moderate persistent asthma without complication  Q30.95 492.71    4. Morbid obesity (HCC)  E66.01 278.01          Assessment and plan  Essential hypertension good control, continue present regimen. Hyperlipidemia good control on last visit recheck before next visit. Continue present regimen. Good weight loss from primarily walking. Continue present regimen. Moderate asthma but no recent exacerbations. Continue present medications. No worsening of the knee or leg pain at this time. Follow. CBC within normal limits last BMP was within normal limits and hep C was negative. Lab results and schedule of future lab studies reviewed with patient  Diagnostic and radiologic results and the schedule of future studies were reviewed with the patient  Reviewed diet, exercise and weight control  All questions answered and understood. Patient has previously received the Covid vaccines      Subjective:   Rosa Majano is a 31 y.o. male has Palpitations, Shortness of breath, Other specified cardiac dysrhythmias(427.89), Cardiomyopathy ([address] Essential hypertension with goal blood pressure less than 140/90, Morbid obesity ([address] Seborrheic dermatitis, Vitamin D deficiency, Moderate persistent asthma without complication, Bilateral low back pain without sciatica, and Hyperlipidemia on their problem list.. No chief complaint on file. The last visit was 10/26/2021. Essential hypertension, doing well continue present regimen. 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 Will trial low-dose gabapentin at nighttime 200 mg to start with. Should improve his weight decreases also. Patient has an unhealthy weight.   He is going to try over-the-counter medication first and if is not improving will trial Adipex. History of hyperlipidemia doing well in March 2020. Will recheck in 1 year. History of cardiomyopathy, stable follow along with cardiology. Seen in follow-up 9/15/2021 by cardiology for cardiomyopathy. He was felt to be stable and improving. He was experiencing significant weight loss with dieting. The patient was seen for essential hypertension, good control continue present regimen. Hyperlipidemia continue present regimen. Morbid obesity, weight is decreasing, continue present management. We will screen for hepatitis C. Right lateral thigh pain differential diagnosis includes sciatica, meralgia paresthetica, lateral tibial band syndrome or other etiologies. At this time we will just use Tylenol or Motrin. He is able to walk at least half an hour without difficulty. If not improving or symptoms worsen we will initiate work-up. Lab results and schedule of future lab studies reviewed with patient  Diagnostic and radiologic results and the schedule of future studies were reviewed with the patient  reviewed diet, exercise and weight control  All questions were answered and understood. He was seen by virtual visit May 2020. His hypertension was well controlled. His hyperlipidemia was in good control. The patient had an unhealthy weight which was improving post increased exercise. He was to be considered for bariatric surgery if not improving. Chronic bilateral low back pain associated with neck pain treated with Aleve. The patient had an episode of acute pharyngitis. ED chest pain  Hypertension  The patient has no headaches, visual changes, chest pain or pressure,dyspnea, orthopnea, or PND. There is no problem with the medication. Hyperlipidemia   The patient denies any myalgias or weakness. No abdominal discomfort admitted to. The patient denies any difficulty with or side effects from the medication.     BP Readings from Last 3 Encounters:   01/25/22 134/69   10/26/21 126/72   09/15/21 116/80     Lab Results   Component Value Date/Time    Cholesterol, total 144 03/02/2020 03:43 PM    HDL Cholesterol 29 (L) 03/02/2020 03:43 PM    LDL, calculated 98.2 03/02/2020 03:43 PM    VLDL, calculated 16.8 03/02/2020 03:43 PM    Triglyceride 84 03/02/2020 03:43 PM    CHOL/HDL Ratio 5.0 03/02/2020 03:43 PM       Right leg pain  Continued symptoms. Is located in the right lateral thigh region. Is described as an aching sensation. The patient is able to move the upper and lower extremities normally with apparent normal strength and ambulate well. No obvious difficulty with balance is noted. Provoked by prolonged walking. He walked a mile and a half here to the visit. It is relieved by rest.  He has to walk several miles before it begins. It does not always happen. There is no change in color in the area. He is taken Alevefor relief of the symptoms without success. There is no history of peripheral vascular disease. The patient is overweight. Overweight  The patient states that the weight has slowly decreased. He knows he is walking more Patient's diet has improved, he states. The patient denies edema of the lower extremities. Aggravating factors include having to make his own meals. .  Associated symptoms include no joint aches. No heat or cold intolerance. .  body mass index is unknown because there is no height or weight on file. Wt Readings from Last 3 Encounters:   01/25/22 346 lb (156.9 kg)   10/26/21 (!) 359 lb (162.8 kg)   09/15/21 (!) 363 lb (164.7 kg)     Cardiomyopathy   The patient denies any chest pain pressure or palpitations. No orthopnea is admitted to. He was lseen by cardiology and this is considered a chronic problem that is improving. Associated symptoms include shortness of breath. Pertinent negatives include no chest pain, no abdominal pain and no headaches.  His last echocardiogram was from August 2019 his ejection fraction had increased to 55 to 60%. No regional wall motion abnormality was noted. There was normal right ventricular size and function. He was felt to be improved. Asthma  The patient has a history of asthma. It is a chronic condition. No recent attacks. no daytime  asthma symptoms   no nightime asthma symptoms    Overweight  The patient states that the weight has decreased. Patient's diet improved. The patient denies edema or ascites. Obesity comorbid conditions include high cholesterol and high blood pressure   body mass index is unknown because there is no height or weight on file. Wt Readings from Last 3 Encounters:   01/25/22 346 lb (156.9 kg)   10/26/21 (!) 359 lb (162.8 kg)   09/15/21 (!) 363 lb (164.7 kg)     Key Obesity Meds     Patient is on no anti-obesity meds. has Palpitations, Shortness of breath, Other specified cardiac dysrhythmias(427.89), Cardiomyopathy ([address] Essential hypertension with goal blood pressure less than 140/90, Morbid obesity ([address] Seborrheic dermatitis, Vitamin D deficiency, Moderate persistent asthma without complication, Bilateral low back pain without sciatica, and Hyperlipidemia on their problem list.    Past Surgical History:   Procedure Laterality Date    HX ADENOIDECTOMY        reports that he has never smoked. He has never used smokeless tobacco. He reports that he does not drink alcohol and does not use drugs. family history includes Cancer in his maternal aunt; Diabetes in his mother; Heart Attack (age of onset: 48) in his father; Hypertension in his mother and sister; Stroke in his maternal grandfather and maternal grandmother. Review of Systems   Constitutional: Negative for chills and fever. HENT: Positive for sore throat. Negative for congestion. Respiratory: Negative for shortness of breath and wheezing. Musculoskeletal: Positive for back pain, joint pain, myalgias and neck pain. Negative for falls.         Right thigh pain on walking  Better at rest  Can walk 20 minutes   Neurological: Positive for headaches. Psychiatric/Behavioral: The patient is not nervous/anxious. Visit Vitals  /69 (BP 1 Location: Left upper arm, BP Patient Position: Sitting, BP Cuff Size: Adult long)   Pulse 79   Temp 98.2 °F (36.8 °C) (Oral)   Resp 24   Ht 5' 9\" (1.753 m)   Wt 346 lb (156.9 kg)   SpO2 98%   BMI 51.10 kg/m²     Wt Readings from Last 3 Encounters:   01/25/22 346 lb (156.9 kg)   10/26/21 (!) 359 lb (162.8 kg)   09/15/21 (!) 363 lb (164.7 kg)         Physical Exam  Vitals and nursing note reviewed. Constitutional:       General: He is not in acute distress. Appearance: He is well-developed. HENT:      Right Ear: External ear normal.      Left Ear: External ear normal.      Nose: Nose normal.   Eyes:      Pupils: Pupils are equal, round, and reactive to light. Neck:      Thyroid: No thyromegaly. Comments: Carotid bruit absent  Cardiovascular:      Rate and Rhythm: Normal rate and regular rhythm. Heart sounds: No murmur heard. No friction rub. No gallop. Pulmonary:      Effort: Pulmonary effort is normal. No respiratory distress. Breath sounds: Normal breath sounds. Abdominal:      General: There is no distension. Palpations: There is no mass. Tenderness: There is no abdominal tenderness. Musculoskeletal:         General: No tenderness (Right side of the neck aggravated by rotation of the neck to the right and right lateral extension). Cervical back: Neck supple. Lymphadenopathy:      Cervical: No cervical adenopathy. Skin:     General: Skin is warm and dry. Neurological:      Mental Status: He is alert and oriented to person, place, and time. Sensory: Sensory deficit (.Monofilament test on the right lateral thigh is diminished) present.    Psychiatric:      Comments: Nice gentleman, likes to tell jokes        Visit Vitals  /69 (BP 1 Location: Left upper arm, BP Patient Position: Sitting, BP Cuff Size: Adult long)   Pulse 79   Temp 98.2 °F (36.8 °C) (Oral)   Resp 24   Ht 5' 9\" (1.753 m)   Wt 346 lb (156.9 kg)   SpO2 98%   BMI 51.10 kg/m²         Results for orders placed or performed during the hospital encounter of 01/11/22   HEPATITIS C AB   Result Value Ref Range    Hepatitis C virus Ab 0.03 <0.80 Index    Hep C virus Ab Interp. Negative NEG      Hep C  virus Ab comment         CBC WITH AUTOMATED DIFF   Result Value Ref Range    WBC 6.1 4.6 - 13.2 K/uL    RBC 4.71 4.35 - 5.65 M/uL    HGB 13.6 13.0 - 16.0 g/dL    HCT 41.4 36.0 - 48.0 %    MCV 87.9 78.0 - 100.0 FL    MCH 28.9 24.0 - 34.0 PG    MCHC 32.9 31.0 - 37.0 g/dL    RDW 13.7 11.6 - 14.5 %    PLATELET 020 975 - 884 K/uL    MPV 10.4 9.2 - 11.8 FL    NRBC 0.0 0  WBC    ABSOLUTE NRBC 0.00 0.00 - 0.01 K/uL    NEUTROPHILS 41 40 - 73 %    LYMPHOCYTES 46 21 - 52 %    MONOCYTES 12 (H) 3 - 10 %    EOSINOPHILS 1 0 - 5 %    BASOPHILS 0 0 - 2 %    IMMATURE GRANULOCYTES 0 0.0 - 0.5 %    ABS. NEUTROPHILS 2.5 1.8 - 8.0 K/UL    ABS. LYMPHOCYTES 2.8 0.9 - 3.6 K/UL    ABS. MONOCYTES 0.7 0.05 - 1.2 K/UL    ABS. EOSINOPHILS 0.1 0.0 - 0.4 K/UL    ABS. BASOPHILS 0.0 0.0 - 0.1 K/UL    ABS. IMM. GRANS. 0.0 0.00 - 0.04 K/UL    DF AUTOMATED     METABOLIC PANEL, BASIC   Result Value Ref Range    Sodium 139 136 - 145 mmol/L    Potassium 4.6 3.5 - 5.5 mmol/L    Chloride 107 100 - 111 mmol/L    CO2 29 21 - 32 mmol/L    Anion gap 3 3.0 - 18 mmol/L    Glucose 70 (L) 74 - 99 mg/dL    BUN 18 7.0 - 18 MG/DL    Creatinine 0.84 0.6 - 1.3 MG/DL    BUN/Creatinine ratio 21 (H) 12 - 20      GFR est AA >60 >60 ml/min/1.73m2    GFR est non-AA >60 >60 ml/min/1.73m2    Calcium 9.1 8.5 - 10.1 MG/DL         We discussed the expected course, resolution and complications of the diagnosis(es) in detail. Medication risks, benefits, costs, interactions, and alternatives were discussed as indicated.   I advised him to contact the office if his condition worsens, changes or fails to improve as anticipated. He expressed understanding with the diagnosis(es) and plan. This note was done with the assistance of dragon speech software.   Some inadvertent errors or omissions may be present

## 2022-01-25 ENCOUNTER — OFFICE VISIT (OUTPATIENT)
Dept: FAMILY MEDICINE CLINIC | Age: 27
End: 2022-01-25
Payer: MEDICAID

## 2022-01-25 VITALS
HEART RATE: 79 BPM | RESPIRATION RATE: 24 BRPM | SYSTOLIC BLOOD PRESSURE: 134 MMHG | OXYGEN SATURATION: 98 % | WEIGHT: 315 LBS | HEIGHT: 69 IN | DIASTOLIC BLOOD PRESSURE: 69 MMHG | TEMPERATURE: 98.2 F | BODY MASS INDEX: 46.65 KG/M2

## 2022-01-25 DIAGNOSIS — J45.40 MODERATE PERSISTENT ASTHMA WITHOUT COMPLICATION: ICD-10-CM

## 2022-01-25 DIAGNOSIS — E78.5 HYPERLIPIDEMIA, UNSPECIFIED HYPERLIPIDEMIA TYPE: ICD-10-CM

## 2022-01-25 DIAGNOSIS — I10 ESSENTIAL HYPERTENSION WITH GOAL BLOOD PRESSURE LESS THAN 140/90: Primary | ICD-10-CM

## 2022-01-25 DIAGNOSIS — E66.01 MORBID OBESITY (HCC): ICD-10-CM

## 2022-01-25 PROCEDURE — 99214 OFFICE O/P EST MOD 30 MIN: CPT | Performed by: EMERGENCY MEDICINE

## 2022-01-25 NOTE — PROGRESS NOTES
1. \"Have you been to the ER, urgent care clinic since your last visit? Hospitalized since your last visit? \" No    2. \"Have you seen or consulted any other health care providers outside of the [address] [place] [address] since your last visit? \" No     3. For patients aged 39-70: Has the patient had a colonoscopy / FIT/ Cologuard? NA - based on age      If the patient is female:    4. For patients aged 41-77: Has the patient had a mammogram within the past 2 years? NA - based on age or sex  See top three    5. For patients aged 21-65: Has the patient had a pap smear?  NA - based on age or sex

## 2022-01-25 NOTE — PATIENT INSTRUCTIONS
Learning About High Blood Pressure  What is high blood pressure? Blood pressure is a measure of how hard the blood pushes against the walls of your arteries. It's normal for blood pressure to go up and down throughout the day. But if it stays up, you have high blood pressure. Another name for high blood pressure is hypertension. Two numbers tell you your blood pressure. The first number is the systolic pressure (top number). It shows how hard the blood pushes when your heart is pumping. The second number is the diastolic pressure (bottom number). It shows how hard the blood pushes between heartbeats, when your heart is relaxed and filling with blood. Your doctor will give you a goal for your blood pressure based on your health and your age. High blood pressure (hypertension) means that the top number stays high, or the bottom number stays high, or both. High blood pressure increases the risk of stroke, heart attack, and other problems. What happens when you have high blood pressure? · Blood flows through your arteries with too much force. Over time, this can damage the heart and the walls of your arteries. But you can't feel it. High blood pressure usually doesn't cause symptoms. · High blood pressure makes your heart work harder. And that can lead to heart failure, which means your heart doesn't pump as much blood as your body needs. · Fat and calcium start to build up in your arteries. This buildup is called hardening of the arteries. It can cause many problems including a heart attack and stroke. · Arteries also carry blood and oxygen to organs like your eyes, kidneys, and brain. If high blood pressure damages those arteries, it can lead to vision loss, kidney disease, stroke, and a higher risk of dementia. How can you prevent high blood pressure? · Stay at a healthy weight. · Try to limit how much sodium you eat to less than 2,300 milligrams (mg) a day.  If you limit your sodium to 1,500 mg a day, you can lower your blood pressure even more. ? Buy foods that are labeled \"unsalted,\" \"sodium-free,\" or \"low-sodium. \" Foods labeled \"reduced-sodium\" and \"light sodium\" may still have too much sodium. ? Flavor your food with garlic, lemon juice, onion, vinegar, herbs, and spices instead of salt. Do not use soy sauce, steak sauce, onion salt, garlic salt, mustard, or ketchup on your food. ? Use less salt (or none) when recipes call for it. You can often use half the salt a recipe calls for without losing flavor. · Be physically active. Get at least 30 minutes of exercise on most days of the week. Walking is a good choice. You also may want to do other activities, such as running, swimming, cycling, or playing tennis or team sports. · Limit alcohol to 2 drinks a day for men and 1 drink a day for women. · Eat plenty of fruits, vegetables, and low-fat dairy products. Eat less saturated and total fats. How is high blood pressure treated? · Your doctor will suggest making lifestyle changes to help your heart. For example, your doctor may ask you to eat healthy foods, quit smoking, lose extra weight, and be more active. · If lifestyle changes don't help enough, your doctor may recommend that you take medicine. · When blood pressure is very high, medicines are needed to lower it. Follow-up care is a key part of your treatment and safety. Be sure to make and go to all appointments, and call your doctor if you are having problems. It's also a good idea to know your test results and keep a list of the medicines you take. Where can you learn more? Go to [email or web address]  Enter P501 in the search box to learn more about \"Learning About High Blood Pressure. \"  Current as of: April 29, 2021               Content Version: 13.0  © 5387-9846 Healthwise, Incorporated.    Care instructions adapted under license by [employer] (which disclaims liability or warranty for this information). If you have questions about a medical condition or this instruction, always ask your healthcare professional. [address] any warranty or liability for your use of this information. Learning About High Cholesterol  What is high cholesterol? High cholesterol means that you have too much cholesterol in your blood. Cholesterol is a type of fat. It's needed for many body functions, such as making new cells. Cholesterol is made by your body. It also comes from food you eat. Having high cholesterol can lead to the buildup of plaque in artery walls. This can increase your risk of heart attack and stroke. When your doctor talks about high cholesterol levels, your doctor is talking about your total cholesterol and LDL cholesterol (the \"bad\" cholesterol) levels. Your doctor may also speak about HDL (the \"good\" cholesterol) levels. High HDL is linked with a lower risk for coronary artery disease, heart attack, and stroke. Your cholesterol levels help your doctor find out your risk for having a heart attack or stroke. How can you help prevent high cholesterol? A heart-healthy lifestyle can help you prevent high cholesterol and lower your risk for a heart attack and stroke. · Eat heart-healthy foods. ? Eat fruits, vegetables, whole grains, beans, and other high-fiber foods. ? Eat lean proteins, such as seafood, lean meats, beans, nuts, and soy products. ? Eat healthy fats, such as canola and olive oil. ? Choose foods that are low in saturated fat. ? Limit sodium and alcohol. ? Limit drinks and foods with added sugar. · Be active. Try to do moderate activity at least 2½ hours a week. Or try vigorous activity at least 1¼ hours a week. You may want to walk or try other activities, such as running, swimming, cycling, or playing tennis or team sports. · Stay at a healthy weight. Lose weight if you need to. · Don't smoke.  If you need help quitting, talk to your doctor about stop-smoking programs and medicines. These can increase your chances of quitting for good. How is high cholesterol treated? The goal of treatment is to reduce your chances of having a heart attack or stroke. The goal is not to lower your cholesterol numbers only. · Have a heart-healthy lifestyle. This includes eating healthy foods, not smoking, losing weight, and being more active. · You may choose to take medicine. Follow-up care is a key part of your treatment and safety. Be sure to make and go to all appointments, and call your doctor if you are having problems. It's also a good idea to know your test results and keep a list of the medicines you take. Where can you learn more? Go to http://www.[employer].com/  Enter Q621 in the search box to learn more about \"Learning About High Cholesterol. \"  Current as of: April 29, 2021               Content Version: 13.0  © 2006-2021 [employer]. Care instructions adapted under license by [employer] (which disclaims liability or warranty for this information). If you have questions about a medical condition or this instruction, always ask your healthcare professional. [place] [address] any warranty or liability for your use of this information. Body Mass Index: Care Instructions  Your Care Instructions     Body mass index (BMI) can help you see if your weight is raising your risk for health problems. It uses a formula to compare how much you weigh with how tall you are. · A BMI lower than 18.5 is considered underweight. · A BMI between 18.5 and 24.9 is considered healthy. · A BMI between 25 and 29.9 is considered overweight. A BMI of 30 or higher is considered obese. If your BMI is in the normal range, it means that you have a lower risk for weight-related health problems.  If your BMI is in the overweight or obese range, you may be at increased risk for weight-related health problems, such as high blood pressure, heart disease, stroke, arthritis or joint pain, and diabetes. If your BMI is in the underweight range, you may be at increased risk for health problems such as fatigue, lower protection (immunity) against illness, muscle loss, bone loss, hair loss, and hormone problems. BMI is just one measure of your risk for weight-related health problems. You may be at higher risk for health problems if you are not active, you eat an unhealthy diet, or you drink too much alcohol or use tobacco products. Follow-up care is a key part of your treatment and safety. Be sure to make and go to all appointments, and call your doctor if you are having problems. It's also a good idea to know your test results and keep a list of the medicines you take. How can you care for yourself at home? · Practice healthy eating habits. This includes eating plenty of fruits, vegetables, whole grains, lean protein, and low-fat dairy. · If your doctor recommends it, get more exercise. Walking is a good choice. Bit by bit, increase the amount you walk every day. Try for at least 30 minutes on most days of the week. · Do not smoke. Smoking can increase your risk for health problems. If you need help quitting, talk to your doctor about stop-smoking programs and medicines. These can increase your chances of quitting for good. · Limit alcohol to 2 drinks a day for men and 1 drink a day for women. Too much alcohol can cause health problems. If you have a BMI higher than 25  · Your doctor may do other tests to check your risk for weight-related health problems. This may include measuring the distance around your waist. A waist measurement of more than 40 inches in men or 35 inches in women can increase the risk of weight-related health problems. · Talk with your doctor about steps you can take to stay healthy or improve your health.  You may need to make lifestyle changes to lose weight and stay healthy, such as changing your diet and getting regular exercise. If you have a BMI lower than 18.5  · Your doctor may do other tests to check your risk for health problems. · Talk with your doctor about steps you can take to stay healthy or improve your health. You may need to make lifestyle changes to gain or maintain weight and stay healthy, such as getting more healthy foods in your diet and doing exercises to build muscle. Where can you learn more? Go to http://www.[employer].com/  Enter S176 in the search box to learn more about \"Body Mass Index: Care Instructions. \"  Current as of: March 17, 2021               Content Version: 13.0  © 5330-3555 [employer]. Care instructions adapted under license by [employer] (which disclaims liability or warranty for this information). If you have questions about a medical condition or this instruction, always ask your healthcare professional. [address] any warranty or liability for your use of this information. Learning About the [address] Diet  What is the Mediterranean diet? The Mediterranean diet is a style of eating rather than a diet plan. It features foods eaten in [place], [place], [place], and other countries along the [facility]. It emphasizes eating foods like fish, fruits, vegetables, beans, high-fiber breads and whole grains, nuts, and olive oil. This style of eating includes limited red meat, cheese, and sweets. Why choose the Mediterranean diet? A Mediterranean-style diet may improve heart health. It contains more fat than other heart-healthy diets. But the fats are mainly from nuts, unsaturated oils (such as fish oils and olive oil), and certain nut or seed oils (such as canola, soybean, or flaxseed oil). These fats may help protect the heart and blood vessels. How can you get started on the Mediterranean diet?   Here are some things you can do to switch to a more Mediterranean way of eating. What to eat  · Eat a variety of fruits and vegetables each day, such as grapes, blueberries, tomatoes, broccoli, peppers, figs, olives, spinach, eggplant, beans, lentils, and chickpeas. · Eat a variety of whole-grain foods each day, such as oats, brown rice, and whole wheat bread, pasta, and couscous. · Eat fish at least 2 times a week. Try tuna, salmon, mackerel, lake trout, herring, or sardines. · Eat moderate amounts of low-fat dairy products, such as milk, cheese, or yogurt. · Eat moderate amounts of poultry and eggs. · Choose healthy (unsaturated) fats, such as nuts, olive oil, and certain nut or seed oils like canola, soybean, and flaxseed. · Limit unhealthy (saturated) fats, such as butter, palm oil, and coconut oil. And limit fats found in animal products, such as meat and dairy products made with whole milk. Try to eat red meat only a few times a month in very small amounts. · Limit sweets and desserts to only a few times a week. This includes sugar-sweetened drinks like soda. The Mediterranean diet may also include red wine with your meal--1 glass each day for women and up to 2 glasses a day for men. Tips for eating at home  · Use herbs, spices, garlic, lemon zest, and citrus juice instead of salt to add flavor to foods. · Add avocado slices to your sandwich instead of ortega. · Have fish for lunch or dinner instead of red meat. Brush the fish with olive oil, and broil or grill it. · Sprinkle your salad with seeds or nuts instead of cheese. · Cook with olive or canola oil instead of butter or oils that are high in saturated fat. · Switch from 2% milk or whole milk to 1% or fat-free milk. · Dip raw vegetables in a vinaigrette dressing or hummus instead of dips made from mayonnaise or sour cream.  · Have a piece of fruit for dessert instead of a piece of cake. Try baked apples, or have some dried fruit.   Tips for eating out  · Try broiled, grilled, baked, or poached fish instead of having it fried or breaded. · Ask your  to have your meals prepared with olive oil instead of butter. · Order dishes made with marinara sauce or sauces made from olive oil. Avoid sauces made from cream or mayonnaise. · Choose whole-grain breads, whole wheat pasta and pizza crust, brown rice, beans, and lentils. · Cut back on butter or margarine on bread. Instead, you can dip your bread in a small amount of olive oil. · Ask for a side salad or grilled vegetables instead of french fries or chips. Where can you learn more? Go to http://www.anderson.com/  Enter O407 in the search box to learn more about \"Learning About the Mediterranean Diet. \"  Current as of: December 17, 2020               Content Version: 13.0  © 1967-6355 Healthwise, Incorporated. Care instructions adapted under license by [employer] (which disclaims liability or warranty for this information). If you have questions about a medical condition or this instruction, always ask your healthcare professional. [address] any warranty or liability for your use of this information.

## 2022-02-27 NOTE — PROGRESS NOTES
ICD-10-CM ICD-9-CM    1. Essential hypertension with goal blood pressure less than 140/90  I10 401.9    2. Hyperlipidemia, unspecified hyperlipidemia type  E78.5 272.4    3. Morbid obesity ([address]  E66.01 278.01    4. Moderate persistent asthma without complication  V06.84 101.69    5. Neuropathic pain  M79.2 729.2 gabapentin (NEURONTIN) 100 mg capsule     Follow-up and Dispositions    · Return in about 4 months (around 9/3/2022). Assessment and plan  Essential hypertension, good control continue present regimen. Hyperlipidemia good control on 2020 recheck before next visit. Moderate persistent asthma no increase in frequency of attacks, continue to use rescue inhaler. Unhealthy weight. Increasing. Discussed diet and exercise. Neuropathic pain continue gabapentin. Continues to be a problem but well controlled. Lab results and schedule of future lab studies reviewed with patient  Reviewed diet, exercise and weight control  All questions answered and understood. Subjective:   Cori Dalton is a 28 y.o. male has Palpitations, Shortness of breath, Other specified cardiac dysrhythmias(427.89), Cardiomyopathy ([address] Essential hypertension with goal blood pressure less than 140/90, Morbid obesity ([address] Seborrheic dermatitis, Vitamin D deficiency, Moderate persistent asthma without complication, Bilateral low back pain without sciatica, and Hyperlipidemia on their problem list..Follow Up Chronic Condition, Medication Refill, and Leg Pain      The last visit was 1/25/2022  New labs are pending. Essential hypertension good control, continue present regimen. Hyperlipidemia good control on last visit recheck before next visit. Continue present regimen. Good weight loss from primarily walking. Continue present regimen. Moderate asthma but no recent exacerbations. Continue present medications. No worsening of the knee or leg pain at this time. Follow.   CBC within normal limits last BMP was within normal limits and hep C was negative. Patient has previously received the Covid vaccines  The last visit was 10/26/2021. Essential hypertension, doing well continue present regimen. 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 Will trial low-dose gabapentin at nighttime 200 mg to start with. Should improve his weight decreases also. Patient has an unhealthy weight. He is going to try over-the-counter medication first and if is not improving will trial Adipex. History of hyperlipidemia doing well in March 2020. Will recheck in 1 year. History of cardiomyopathy, stable follow along with cardiology. Seen in follow-up 9/15/2021 by cardiology for cardiomyopathy. He was felt to be stable and improving. He was experiencing significant weight loss with dieting. The patient was seen for essential hypertension, good control continue present regimen. Hyperlipidemia continue present regimen. Morbid obesity, weight is decreasing, continue present management. We will screen for hepatitis C. Right lateral thigh pain differential diagnosis includes sciatica, meralgia paresthetica, lateral tibial band syndrome or other etiologies. At this time we will just use Tylenol or Motrin. He is able to walk at least half an hour without difficulty. If not improving or symptoms worsen we will initiate work-up. Lab results and schedule of future lab studies reviewed with patient  Diagnostic and radiologic results and the schedule of future studies were reviewed with the patient  reviewed diet, exercise and weight control  All questions were answered and understood. He was seen by virtual visit May 2020. His hypertension was well controlled. His hyperlipidemia was in good control. The patient had an unhealthy weight which was improving post increased exercise. He was to be considered for bariatric surgery if not improving.   Chronic bilateral low back pain associated with neck pain treated with Aleve. The patient had an episode of acute pharyngitis. ED chest pain  Hypertension  The patient has no headaches, visual changes, chest pain or pressure,dyspnea, orthopnea, or PND. There is no problem with the medication. Hyperlipidemia   The patient denies any myalgias or weakness. No abdominal discomfort admitted to. The patient denies any difficulty with or side effects from the medication. BP Readings from Last 3 Encounters:   05/03/22 136/65   03/16/22 105/60   01/25/22 134/69     Lab Results   Component Value Date/Time    Cholesterol, total 144 03/02/2020 03:43 PM    HDL Cholesterol 29 (L) 03/02/2020 03:43 PM    LDL, calculated 98.2 03/02/2020 03:43 PM    VLDL, calculated 16.8 03/02/2020 03:43 PM    Triglyceride 84 03/02/2020 03:43 PM    CHOL/HDL Ratio 5.0 03/02/2020 03:43 PM       Right leg pain  Continued symptoms. Is located in the right lateral thigh region. Is described as an aching sensation. The patient is able to move the upper and lower extremities normally with apparent normal strength and ambulate well. No obvious difficulty with balance is noted. Provoked by prolonged walking. He walked a mile and a half here to the visit. It is relieved by rest.  He has to walk several miles before it begins. It does not always happen. There is no change in color in the area. He is taken Alevefor relief of the symptoms without success. There is no history of peripheral vascular disease. The patient is overweight. Overweight  The patient states that the weight has slowly increased again. Some stressed.  has broken on I the house. He knows he is walking more Patient's diet has improved, he states. The patient denies edema of the lower extremities. Aggravating factors include having to make his own meals. .  Associated symptoms include no joint aches. No heat or cold intolerance. .  body mass index is 54.05 kg/m².   Wt Readings from Last 3 Encounters: 05/03/22 (!) 366 lb (166 kg)   03/16/22 (!) 351 lb (159.2 kg)   01/25/22 346 lb (156.9 kg)     Cardiomyopathy   The patient denies any chest pain pressure or palpitations. No orthopnea is admitted to. He was lseen by cardiology and this is considered a chronic problem that is improving. Associated symptoms include shortness of breath. Pertinent negatives include no chest pain, no abdominal pain and no headaches. His last echocardiogram was from August 2019 his ejection fraction had increased to 55 to 60%. No regional wall motion abnormality was noted. There was normal right ventricular size and function. He was felt to be improved. Asthma  The patient has a history of asthma. It is a chronic condition. No recent attacks. no daytime  asthma symptoms   no nightime asthma symptoms    Overweight  The patient states that the weight has decreased. Patient's diet improved. The patient denies edema or ascites. Obesity comorbid conditions include high cholesterol and high blood pressure   body mass index is 54.05 kg/m². Wt Readings from Last 3 Encounters:   05/03/22 (!) 366 lb (166 kg)   03/16/22 (!) 351 lb (159.2 kg)   01/25/22 346 lb (156.9 kg)     Key Obesity Meds     Patient is on no anti-obesity meds. Health Maintenance Due   Topic Date Due    COVID-19 Vaccine (1) Never done    Pneumococcal 0-64 years (1 - PCV) Never done    DTaP/Tdap/Td series (2 - Td or Tdap) 01/01/2015     Review of Systems   Constitutional: Negative for chills and fever. HENT: Positive for sore throat. Negative for congestion. Respiratory: Negative for shortness of breath and wheezing. Musculoskeletal: Positive for back pain, joint pain, myalgias and neck pain. Negative for falls. Right thigh pain on walking  Better at rest  Can walk 20 minutes   Neurological: Positive for headaches. Psychiatric/Behavioral: The patient is not nervous/anxious.         has Palpitations, Shortness of breath, Other specified cardiac dysrhythmias(427.89), Cardiomyopathy ([address] Essential hypertension with goal blood pressure less than 140/90, Morbid obesity ([address] Seborrheic dermatitis, Vitamin D deficiency, Moderate persistent asthma without complication, Bilateral low back pain without sciatica, and Hyperlipidemia on their problem list.    Past Surgical History:   Procedure Laterality Date    HX ADENOIDECTOMY        reports that he has never smoked. He has never used smokeless tobacco. He reports that he does not drink alcohol and does not use drugs. family history includes Cancer in his maternal aunt; Diabetes in his mother; Heart Attack (age of onset: 48) in his father; Hypertension in his mother and sister; Stroke in his maternal grandfather and maternal grandmother. Visit Vitals  /65 (BP 1 Location: Right arm, BP Patient Position: Sitting, BP Cuff Size: Large adult long)   Pulse 77   Temp 97.6 °F (36.4 °C) (Temporal)   Resp 16   Ht 5' 9\" (1.753 m)   Wt (!) 366 lb (166 kg)   SpO2 96%   BMI 54.05 kg/m²     Wt Readings from Last 3 Encounters:   05/03/22 (!) 366 lb (166 kg)   03/16/22 (!) 351 lb (159.2 kg)   01/25/22 346 lb (156.9 kg)         Physical Exam  Vitals and nursing note reviewed. Constitutional:       General: He is not in acute distress. Appearance: He is well-developed. HENT:      Right Ear: External ear normal.      Left Ear: External ear normal.      Nose: Nose normal.   Eyes:      Pupils: Pupils are equal, round, and reactive to light. Neck:      Thyroid: No thyromegaly. Comments: Carotid bruit absent  Cardiovascular:      Rate and Rhythm: Normal rate and regular rhythm. Heart sounds: No murmur heard. No friction rub. No gallop. Pulmonary:      Effort: Pulmonary effort is normal. No respiratory distress. Breath sounds: Normal breath sounds. Abdominal:      General: There is no distension. Palpations: There is no mass. Tenderness: There is no abdominal tenderness. Musculoskeletal:         General: No tenderness (Right side of the neck aggravated by rotation of the neck to the right and right lateral extension). Cervical back: Neck supple. Lymphadenopathy:      Cervical: No cervical adenopathy. Skin:     General: Skin is warm and dry. Neurological:      Mental Status: He is alert and oriented to person, place, and time. Sensory: Sensory deficit (.Monofilament test on the right lateral thigh is diminished) present. Psychiatric:      Comments: Nice gentleman, likes to tell jokes        Visit Vitals  /65 (BP 1 Location: Right arm, BP Patient Position: Sitting, BP Cuff Size: Large adult long)   Pulse 77   Temp 97.6 °F (36.4 °C) (Temporal)   Resp 16   Ht 5' 9\" (1.753 m)   Wt (!) 366 lb (166 kg)   SpO2 96%   BMI 54.05 kg/m²         Results for orders placed or performed during the hospital encounter of 01/11/22   HEPATITIS C AB   Result Value Ref Range    Hepatitis C virus Ab 0.03 <0.80 Index    Hep C virus Ab Interp. Negative NEG      Hep C  virus Ab comment         CBC WITH AUTOMATED DIFF   Result Value Ref Range    WBC 6.1 4.6 - 13.2 K/uL    RBC 4.71 4.35 - 5.65 M/uL    HGB 13.6 13.0 - 16.0 g/dL    HCT 41.4 36.0 - 48.0 %    MCV 87.9 78.0 - 100.0 FL    MCH 28.9 24.0 - 34.0 PG    MCHC 32.9 31.0 - 37.0 g/dL    RDW 13.7 11.6 - 14.5 %    PLATELET 202 375 - 225 K/uL    MPV 10.4 9.2 - 11.8 FL    NRBC 0.0 0  WBC    ABSOLUTE NRBC 0.00 0.00 - 0.01 K/uL    NEUTROPHILS 41 40 - 73 %    LYMPHOCYTES 46 21 - 52 %    MONOCYTES 12 (H) 3 - 10 %    EOSINOPHILS 1 0 - 5 %    BASOPHILS 0 0 - 2 %    IMMATURE GRANULOCYTES 0 0.0 - 0.5 %    ABS. NEUTROPHILS 2.5 1.8 - 8.0 K/UL    ABS. LYMPHOCYTES 2.8 0.9 - 3.6 K/UL    ABS. MONOCYTES 0.7 0.05 - 1.2 K/UL    ABS. EOSINOPHILS 0.1 0.0 - 0.4 K/UL    ABS. BASOPHILS 0.0 0.0 - 0.1 K/UL    ABS. IMM.  GRANS. 0.0 0.00 - 0.04 K/UL    DF AUTOMATED     METABOLIC PANEL, BASIC   Result Value Ref Range    Sodium 139 136 - 145 mmol/L Potassium 4.6 3.5 - 5.5 mmol/L    Chloride 107 100 - 111 mmol/L    CO2 29 21 - 32 mmol/L    Anion gap 3 3.0 - 18 mmol/L    Glucose 70 (L) 74 - 99 mg/dL    BUN 18 7.0 - 18 MG/DL    Creatinine 0.84 0.6 - 1.3 MG/DL    BUN/Creatinine ratio 21 (H) 12 - 20      GFR est AA >60 >60 ml/min/1.73m2    GFR est non-AA >60 >60 ml/min/1.73m2    Calcium 9.1 8.5 - 10.1 MG/DL         We discussed the expected course, resolution and complications of the diagnosis(es) in detail. Medication risks, benefits, costs, interactions, and alternatives were discussed as indicated. I advised him to contact the office if his condition worsens, changes or fails to improve as anticipated. He expressed understanding with the diagnosis(es) and plan. This note was done with the assistance of dragon speech software.   Some inadvertent errors or omissions may be present

## 2022-03-16 ENCOUNTER — OFFICE VISIT (OUTPATIENT)
Dept: CARDIOLOGY CLINIC | Age: 27
End: 2022-03-16
Payer: MEDICAID

## 2022-03-16 VITALS
BODY MASS INDEX: 46.65 KG/M2 | DIASTOLIC BLOOD PRESSURE: 60 MMHG | OXYGEN SATURATION: 98 % | WEIGHT: 315 LBS | HEART RATE: 53 BPM | SYSTOLIC BLOOD PRESSURE: 105 MMHG | HEIGHT: 69 IN

## 2022-03-16 DIAGNOSIS — E78.5 HYPERLIPIDEMIA, UNSPECIFIED HYPERLIPIDEMIA TYPE: ICD-10-CM

## 2022-03-16 DIAGNOSIS — E66.01 MORBID OBESITY (HCC): ICD-10-CM

## 2022-03-16 DIAGNOSIS — I10 ESSENTIAL HYPERTENSION WITH GOAL BLOOD PRESSURE LESS THAN 140/90: Primary | ICD-10-CM

## 2022-03-16 DIAGNOSIS — I42.9 CARDIOMYOPATHY, UNSPECIFIED TYPE (HCC): ICD-10-CM

## 2022-03-16 PROCEDURE — 99214 OFFICE O/P EST MOD 30 MIN: CPT | Performed by: INTERNAL MEDICINE

## 2022-03-16 NOTE — PROGRESS NOTES
HISTORY OF PRESENT ILLNESS  Maximus Chawla is a 31 y.o. male. Follow-up  Associated symptoms include shortness of breath. Pertinent negatives include no chest pain, no abdominal pain and no headaches. Cardiomyopathy  The history is provided by the patient. This is a chronic problem. The problem has been resolved. Associated symptoms include shortness of breath. Pertinent negatives include no chest pain, no abdominal pain and no headaches. Hypertension  The history is provided by the patient. This is a chronic problem. The problem has not changed since onset. Associated symptoms include shortness of breath. Pertinent negatives include no chest pain, no abdominal pain and no headaches. Shortness of Breath  The history is provided by the patient. This is a recurrent problem. The problem occurs intermittently. The problem has been rapidly improving. Pertinent negatives include no fever, no headaches, no cough, no sputum production, no hemoptysis, no wheezing, no PND, no orthopnea, no chest pain, no vomiting, no abdominal pain, no rash, no leg swelling and no claudication. Review of Systems   Constitutional: Negative for chills and fever. HENT: Negative for nosebleeds. Eyes: Negative for blurred vision and double vision. Respiratory: Positive for shortness of breath. Negative for cough, hemoptysis, sputum production and wheezing. Cardiovascular: Negative for chest pain, palpitations, orthopnea, claudication, leg swelling and PND. Gastrointestinal: Negative for abdominal pain, heartburn, nausea and vomiting. Musculoskeletal: Negative for myalgias. Skin: Negative for rash. Neurological: Negative for dizziness, weakness and headaches. Endo/Heme/Allergies: Does not bruise/bleed easily.      Family History   Problem Relation Age of Onset    Diabetes Mother     Hypertension Mother     Heart Attack Father 48    Hypertension Sister     Cancer Maternal Aunt     Stroke Maternal Grandmother  Stroke Maternal Grandfather     Heart Surgery Neg Hx        Past Medical History:   Diagnosis Date    Asthma 5/5/2014    possible asthma r/o cardiac etiology h/o chest trauma as a child     Hypertension     Knee pain, right     Other specified cardiac dysrhythmias(427.89) 5/5/2014    marked sinus bradycardia     Palpitations 5/5/2014    r/o arrythmia     Shortness of breath 5/5/2014    possible asthma r/o cardiac etiology h/o chest trauma as a child        Past Surgical History:   Procedure Laterality Date    HX ADENOIDECTOMY         Social History     Tobacco Use    Smoking status: Never Smoker    Smokeless tobacco: Never Used   Substance Use Topics    Alcohol use: No     Alcohol/week: 0.0 standard drinks       Allergies   Allergen Reactions    Penicillins Unable to Obtain           Visit Vitals  /60 (BP 1 Location: Left upper arm, BP Patient Position: Sitting)   Pulse (!) 53   Ht 5' 9\" (1.753 m)   Wt (!) 159.2 kg (351 lb)   SpO2 98%   BMI 51.83 kg/m²         Physical Exam  Constitutional:       Appearance: He is well-developed. HENT:      Head: Normocephalic and atraumatic. Eyes:      Conjunctiva/sclera: Conjunctivae normal.   Neck:      Thyroid: No thyromegaly. Vascular: No JVD. Trachea: No tracheal deviation. Cardiovascular:      Rate and Rhythm: Normal rate and regular rhythm. Heart sounds: Normal heart sounds. No murmur heard. No friction rub. No gallop. Pulmonary:      Effort: No respiratory distress. Breath sounds: Normal breath sounds. No wheezing or rales. Chest:      Chest wall: No tenderness. Abdominal:      Palpations: Abdomen is soft. Tenderness: There is no abdominal tenderness. Musculoskeletal:      Cervical back: Neck supple. Skin:     General: Skin is warm and dry. Neurological:      Mental Status: He is alert and oriented to person, place, and time. Mr. Gerald Lloyd has a reminder for a \"due or due soon\" health maintenance.  I have asked that he contact his primary care provider for follow-up on this health maintenance. No flowsheet data found. I have personally reviewed patient's records available from hospital and other providers and incorporated findings in patient care. SUMMARY:echo:12/2015  Left ventricle: Systolic function was normal. Ejection fraction was  estimated to be 55 %. There were no regional wall motion abnormalities. Left ventricular diastolic function parameters were normal.    COMPARISONS:  Comparison was made with the previous study of 02-Jun-2015. LV overall  function has increased from 45 % to 55 %  Interpretation Summary 8/2019       · Left Ventricle: Normal cavity size, systolic function (ejection fraction normal) and diastolic function. Moderate concentric hypertrophy. Estimated left ventricular ejection fraction is 56 - 60%. No regional wall motion abnormality noted. · Right Ventricle: Normal right ventricular size and function. · No hemodynamically significant valvular pathology. I Have personally reviewed recent relevant labs available and discussed with patient  6/2019BMP and lipids  6/2021  CBC, BMP, EKG, x-ray  Assessment         ICD-10-CM ICD-9-CM    1. Essential hypertension with goal blood pressure less than 140/90  I10 401.9     Stable continue treatment monitor   2. Cardiomyopathy, unspecified type ([facility] [address]  I42.9 425.4     Stable continue therapy   3. Hyperlipidemia, unspecified hyperlipidemia type  E78.5 272.4     Continue treatment lab with PCP   4. Morbid obesity ([facility] [address]  E66.01 278.01     Continue diet modification and exercise   2/2021  Patient seen for follow-up. Cardiomyopathy stable with improving function tolerating beta-blocker and lisinopril. Continue statin check labs. Continue diet and exercise  9/2021  Cardiac status stable. ER visit in 6/2021 unremarkable labs and EKG. Blood pressure controlled.   Has significant weight loss with dieting continue treatment  3/2022  Cardiac status stable. Blood pressure controlled. Continue dietary modification and weight  No orders of the defined types were placed in this encounter. Follow-up and Dispositions    · Return in about 6 months (around 9/16/2022).

## 2022-03-16 NOTE — PROGRESS NOTES
1. Have you been to the ER, urgent care clinic since your last visit? Hospitalized since your last visit? No     2. Have you seen or consulted any other health care providers outside of the [address] [place] [address] since your last visit? Include any pap smears or colon screening. No        Patient has had medications today.

## 2022-03-19 PROBLEM — E66.01 MORBID OBESITY (HCC): Status: ACTIVE | Noted: 2017-09-07

## 2022-03-19 PROBLEM — L21.9 SEBORRHEIC DERMATITIS: Status: ACTIVE | Noted: 2018-02-05

## 2022-03-19 PROBLEM — M54.50 BILATERAL LOW BACK PAIN WITHOUT SCIATICA: Status: ACTIVE | Noted: 2018-02-05

## 2022-03-19 PROBLEM — J45.40 MODERATE PERSISTENT ASTHMA WITHOUT COMPLICATION: Status: ACTIVE | Noted: 2018-02-05

## 2022-03-20 PROBLEM — E78.5 HYPERLIPIDEMIA: Status: ACTIVE | Noted: 2019-05-29

## 2022-03-20 PROBLEM — E55.9 VITAMIN D DEFICIENCY: Status: ACTIVE | Noted: 2018-02-05

## 2022-05-03 ENCOUNTER — OFFICE VISIT (OUTPATIENT)
Dept: FAMILY MEDICINE CLINIC | Age: 27
End: 2022-05-03
Payer: MEDICAID

## 2022-05-03 VITALS
HEART RATE: 77 BPM | DIASTOLIC BLOOD PRESSURE: 65 MMHG | HEIGHT: 69 IN | OXYGEN SATURATION: 96 % | SYSTOLIC BLOOD PRESSURE: 136 MMHG | WEIGHT: 315 LBS | TEMPERATURE: 97.6 F | BODY MASS INDEX: 46.65 KG/M2 | RESPIRATION RATE: 16 BRPM

## 2022-05-03 DIAGNOSIS — E66.01 MORBID OBESITY (HCC): ICD-10-CM

## 2022-05-03 DIAGNOSIS — E78.5 HYPERLIPIDEMIA, UNSPECIFIED HYPERLIPIDEMIA TYPE: ICD-10-CM

## 2022-05-03 DIAGNOSIS — J45.40 MODERATE PERSISTENT ASTHMA WITHOUT COMPLICATION: ICD-10-CM

## 2022-05-03 DIAGNOSIS — M79.2 NEUROPATHIC PAIN: ICD-10-CM

## 2022-05-03 DIAGNOSIS — I10 ESSENTIAL HYPERTENSION WITH GOAL BLOOD PRESSURE LESS THAN 140/90: Primary | ICD-10-CM

## 2022-05-03 PROCEDURE — 99213 OFFICE O/P EST LOW 20 MIN: CPT | Performed by: EMERGENCY MEDICINE

## 2022-05-03 RX ORDER — GABAPENTIN 100 MG/1
CAPSULE ORAL
Qty: 90 CAPSULE | Refills: 0 | Status: SHIPPED | OUTPATIENT
Start: 2022-05-03 | End: 2022-09-12 | Stop reason: SDUPTHER

## 2022-05-03 NOTE — PATIENT INSTRUCTIONS
Learning About High Blood Pressure  What is high blood pressure? Blood pressure is a measure of how hard the blood pushes against the walls of your arteries. It's normal for blood pressure to go up and down throughout the day. But if it stays up, you have high blood pressure. Another name for high blood pressure is hypertension. Two numbers tell you your blood pressure. The first number is the systolic pressure (top number). It shows how hard the blood pushes when your heart is pumping. The second number is the diastolic pressure (bottom number). It shows how hard the blood pushes between heartbeats, when your heart is relaxed and filling with blood. Your doctor will give you a goal for your blood pressure based on your health and your age. High blood pressure (hypertension) means that the top number stays high, or the bottom number stays high, or both. High blood pressure increases the risk of stroke, heart attack, and other problems. What happens when you have high blood pressure? · Blood flows through your arteries with too much force. Over time, this can damage the heart and the walls of your arteries. But you can't feel it. High blood pressure usually doesn't cause symptoms. · High blood pressure makes your heart work harder. And that can lead to heart failure, which means your heart doesn't pump as much blood as your body needs. · Fat and calcium start to build up in your arteries. This buildup is called hardening of the arteries. It can cause many problems including a heart attack and stroke. · Arteries also carry blood and oxygen to organs like your eyes, kidneys, and brain. If high blood pressure damages those arteries, it can lead to vision loss, kidney disease, stroke, and a higher risk of dementia. How can you prevent high blood pressure? · Stay at a healthy weight. · Try to limit how much sodium you eat to less than 2,300 milligrams (mg) a day.  If you limit your sodium to 1,500 mg a day, you can lower your blood pressure even more. ? Buy foods that are labeled \"unsalted,\" \"sodium-free,\" or \"low-sodium. \" Foods labeled \"reduced-sodium\" and \"light sodium\" may still have too much sodium. ? Flavor your food with garlic, lemon juice, onion, vinegar, herbs, and spices instead of salt. Do not use soy sauce, steak sauce, onion salt, garlic salt, mustard, or ketchup on your food. ? Use less salt (or none) when recipes call for it. You can often use half the salt a recipe calls for without losing flavor. · Be physically active. Get at least 30 minutes of exercise on most days of the week. Walking is a good choice. You also may want to do other activities, such as running, swimming, cycling, or playing tennis or team sports. · Limit alcohol to 2 drinks a day for men and 1 drink a day for women. · Eat plenty of fruits, vegetables, and low-fat dairy products. Eat less saturated and total fats. How is high blood pressure treated? · Your doctor will suggest making lifestyle changes to help your heart. For example, your doctor may ask you to eat healthy foods, quit smoking, lose extra weight, and be more active. · If lifestyle changes don't help enough, your doctor may recommend that you take medicine. · When blood pressure is very high, medicines are needed to lower it. Follow-up care is a key part of your treatment and safety. Be sure to make and go to all appointments, and call your doctor if you are having problems. It's also a good idea to know your test results and keep a list of the medicines you take. Where can you learn more? Go to http://www.[facility].com/  Enter P501 in the search box to learn more about \"Learning About High Blood Pressure. \"  Current as of: January 10, 2022               Content Version: 13.2  © 8443-4744 Healthwise, Incorporated.    Care instructions adapted under license by [employer] (which disclaims liability or warranty for this information). If you have questions about a medical condition or this instruction, always ask your healthcare professional. [address] any warranty or liability for your use of this information. Learning About the [address] Diet  What is the Mediterranean diet? The Mediterranean diet is a style of eating rather than a diet plan. It features foods eaten in [place], [place], [place], and other countries along the [facility]. It emphasizes eating foods like fish, fruits, vegetables, beans, high-fiber breads and whole grains, nuts, and olive oil. This style of eating includes limited red meat, cheese, and sweets. Why choose the Mediterranean diet? A Mediterranean-style diet may improve heart health. It contains more fat than other heart-healthy diets. But the fats are mainly from nuts, unsaturated oils (such as fish oils and olive oil), and certain nut or seed oils (such as canola, soybean, or flaxseed oil). These fats may help protect the heart and blood vessels. How can you get started on the Mediterranean diet? Here are some things you can do to switch to a more Mediterranean way of eating. What to eat  · Eat a variety of fruits and vegetables each day, such as grapes, blueberries, tomatoes, broccoli, peppers, figs, olives, spinach, eggplant, beans, lentils, and chickpeas. · Eat a variety of whole-grain foods each day, such as oats, brown rice, and whole wheat bread, pasta, and couscous. · Eat fish at least 2 times a week. Try tuna, salmon, mackerel, lake trout, herring, or sardines. · Eat moderate amounts of low-fat dairy products, such as milk, cheese, or yogurt. · Eat moderate amounts of poultry and eggs. · Choose healthy (unsaturated) fats, such as nuts, olive oil, and certain nut or seed oils like canola, soybean, and flaxseed. · Limit unhealthy (saturated) fats, such as butter, palm oil, and coconut oil.  And limit fats found in animal products, such as meat and dairy products made with whole milk. Try to eat red meat only a few times a month in very small amounts. · Limit sweets and desserts to only a few times a week. This includes sugar-sweetened drinks like soda. The Mediterranean diet may also include red wine with your meal--1 glass each day for women and up to 2 glasses a day for men. Tips for eating at home  · Use herbs, spices, garlic, lemon zest, and citrus juice instead of salt to add flavor to foods. · Add avocado slices to your sandwich instead of ortega. · Have fish for lunch or dinner instead of red meat. Brush the fish with olive oil, and broil or grill it. · Sprinkle your salad with seeds or nuts instead of cheese. · Cook with olive or canola oil instead of butter or oils that are high in saturated fat. · Switch from 2% milk or whole milk to 1% or fat-free milk. · Dip raw vegetables in a vinaigrette dressing or hummus instead of dips made from mayonnaise or sour cream.  · Have a piece of fruit for dessert instead of a piece of cake. Try baked apples, or have some dried fruit. Tips for eating out  · Try broiled, grilled, baked, or poached fish instead of having it fried or breaded. · Ask your  to have your meals prepared with olive oil instead of butter. · Order dishes made with marinara sauce or sauces made from olive oil. Avoid sauces made from cream or mayonnaise. · Choose whole-grain breads, whole wheat pasta and pizza crust, brown rice, beans, and lentils. · Cut back on butter or margarine on bread. Instead, you can dip your bread in a small amount of olive oil. · Ask for a side salad or grilled vegetables instead of french fries or chips. Where can you learn more? Go to http://www.[employer].com/  Enter O407 in the search box to learn more about \"Learning About the Mediterranean Diet. \"  Current as of: September 8, 2021               Content Version: 13.2  © 0746-9864 Healthwise, Incorporated. Care instructions adapted under license by [employer] (which disclaims liability or warranty for this information). If you have questions about a medical condition or this instruction, always ask your healthcare professional. [address] any warranty or liability for your use of this information. Learning About High Cholesterol  What is high cholesterol? High cholesterol means that you have too much cholesterol in your blood. Cholesterol is a type of fat. It's needed for many body functions, such as making new cells. Cholesterol is made by your body. It also comes from food you eat. Having high cholesterol can lead to the buildup of plaque in artery walls. This can increase your risk of heart attack and stroke. When your doctor talks about high cholesterol levels, your doctor is talking about your total cholesterol and LDL cholesterol (the \"bad\" cholesterol) levels. Your doctor may also speak about HDL (the \"good\" cholesterol) levels. High HDL is linked with a lower risk for coronary artery disease, heart attack, and stroke. Your cholesterol levels help your doctor find out your risk for having a heart attack or stroke. How can you help prevent high cholesterol? A heart-healthy lifestyle can help you prevent high cholesterol and lower your risk for a heart attack and stroke. · Eat heart-healthy foods. ? Eat fruits, vegetables, whole grains, beans, and other high-fiber foods. ? Eat lean proteins, such as seafood, lean meats, beans, nuts, and soy products. ? Eat healthy fats, such as canola and olive oil. ? Choose foods that are low in saturated fat. ? Limit sodium and alcohol. ? Limit drinks and foods with added sugar. · Be active. Try to do moderate activity at least 2½ hours a week. Or try vigorous activity at least 1¼ hours a week.  You may want to walk or try other activities, such as running, swimming, cycling, or playing tennis or team sports. · Stay at a healthy weight. Lose weight if you need to. · Don't smoke. If you need help quitting, talk to your doctor about stop-smoking programs and medicines. These can increase your chances of quitting for good. How is high cholesterol treated? The goal of treatment is to reduce your chances of having a heart attack or stroke. The goal is not to lower your cholesterol numbers only. · Have a heart-healthy lifestyle. This includes eating healthy foods, not smoking, losing weight, and being more active. · You may choose to take medicine. Follow-up care is a key part of your treatment and safety. Be sure to make and go to all appointments, and call your doctor if you are having problems. It's also a good idea to know your test results and keep a list of the medicines you take. Where can you learn more? Go to http://www.[employer].com/  Enter Q621 in the search box to learn more about \"Learning About High Cholesterol. \"  Current as of: January 10, 2022               Content Version: 13.2  © 2006-2022 Healthwise, Incorporated. Care instructions adapted under license by [employer] (which disclaims liability or warranty for this information). If you have questions about a medical condition or this instruction, always ask your healthcare professional. [address] any warranty or liability for your use of this information.

## 2022-05-03 NOTE — PROGRESS NOTES
Kayla Ashley is a 28 y.o. male that is here for a   Chief Complaint   Patient presents with    Follow Up Chronic Condition         1. Have you been to the ER, urgent care clinic since your last visit? Hospitalized since your last visit?no    2. Have you seen or consulted any other health care providers outside of the [address] [place] [address] since your last visit? Include any pap smears or colon screening.  no      Health Maintenance reviewed - yes      Upcoming Appts  no      VORB: No orders of the defined types were placed in this encounter.   Tony Zuñiga MD/ Esa Vinson MA

## 2022-07-06 ENCOUNTER — HOSPITAL ENCOUNTER (OUTPATIENT)
Dept: LAB | Age: 27
Discharge: HOME | End: 2022-07-06

## 2022-07-06 LAB — XX-LABCORP SPECIMEN COL,LCBCF: NORMAL

## 2022-07-06 PROCEDURE — 99001 SPECIMEN HANDLING PT-LAB: CPT

## 2022-07-07 LAB
CHOLEST SERPL-MCNC: 143 MG/DL (ref 100–199)
HDLC SERPL-MCNC: 29 MG/DL
LDLC SERPL CALC-MCNC: 94 MG/DL (ref 0–99)
TRIGL SERPL-MCNC: 106 MG/DL (ref 0–149)
VLDLC SERPL CALC-MCNC: 20 MG/DL (ref 5–40)

## 2022-09-02 NOTE — PROGRESS NOTES
ICD-10-CM ICD-9-CM    1. Essential hypertension with goal blood pressure less than 140/90  I10 401.9       2. Hyperlipidemia, unspecified hyperlipidemia type  E78.5 272.4       3. Morbid obesity (HCC)  E66.01 278.01 phentermine (ADIPEX-P) 37.5 mg tablet      4. Neuropathic pain  M79.2 729.2 gabapentin (NEURONTIN) 100 mg capsule      5. Encounter for immunization  Z23 V03.89 diphth,pertus,acell,,tetanus (BOOSTRIX TDAP) 2.5-8-5 Lf-mcg-Lf/0.5mL susp suspension      pneumococcal 23-valent (PNEUMOVAX 23) 25 mcg/0.5 mL injection      6. Strain of neck muscle, initial encounter  S16. 1XXA 847.0     Improved but still using the Flexeril at at bedtime. Continue present therapy        Follow-up and Dispositions    Return in about 4 months (around 1/12/2023) for Follow up on illness, Follow-up labs/diagnostics in 3 months. Assessment and plan  Essential hypertension, good control continue present treatment. Hyperlipidemia total cholesterol 143 in July 2022. Adequate control. Follow-up labs before next visit. Moderate persistent asthma stable with no symptoms at this time. Able to walk several miles to get here. Unhealthy weight. Increasing. Discussed diet and exercise. Adding phentermine for now. Neuropathic pain continue gabapentin. Continues to be a problem but well controlled. Lab results and schedule of future lab studies reviewed with patient  Reviewed diet, exercise and weight control  All questions answered and understood.   Health Maintenance Due   Topic Date Due    COVID-19 Vaccine (1) Never done    Pneumococcal 0-64 years (1 - PCV) Never done    DTaP/Tdap/Td series (2 - Td or Tdap) 01/01/2015    Flu Vaccine (1) 09/01/2022     Subjective:   Patricia Dupree is a 28 y.o. male has Palpitations, Shortness of breath, Other specified cardiac dysrhythmias(427.89), Cardiomyopathy ([address] Essential hypertension with goal blood pressure less than 140/90, Morbid obesity ([address] Seborrheic dermatitis, Vitamin D deficiency, Moderate persistent asthma without complication, Bilateral low back pain without sciatica, and Hyperlipidemia on their problem list..Follow Up Chronic Condition         Seen previously by our practice on 5/3/2022. The primary encounter diagnosis was Essential hypertension with goal blood pressure less than 140/90. Diagnoses of Hyperlipidemia, unspecified hyperlipidemia type, Morbid obesity ([address] Neuropathic pain, Encounter for immunization, and Strain of neck muscle, initial encounter were also pertinent to this visit. Knee pain  Chronic problemThis is a chronic problem. It is unchanged. The problem is in good control. There are no new aggravating or relieving factors. There is no history of any new associated signs, symptoms, or complications. The treatment is unchanged and there are no side effects from it. Seen previously by our practice on 5/3/2022  The primary encounter diagnosis was Essential hypertension with goal blood pressure less than 140/90. Diagnoses of Hyperlipidemia, unspecified hyperlipidemia type, Morbid obesity ([address] Neuropathic pain, Encounter for immunization, and Strain of neck muscle, initial encounter were also pertinent to this visit. The last visit was 1/25/2022  New labs are pending. Essential hypertension good control, continue present regimen. Hyperlipidemia good control on last visit recheck before next visit. Continue present regimen. Good weight loss from primarily walking. Continue present regimen. Moderate asthma but no recent exacerbations. Continue present medications. No worsening of the knee or leg pain at this time. Follow. CBC within normal limits last BMP was within normal limits and hep C was negative. Patient has previously received the Covid vaccines  The last visit was 10/26/2021. Essential hypertension, doing well continue present regimen.   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 Will trial low-dose gabapentin at nighttime 200 mg to start with. Should improve his weight decreases also. Patient has an unhealthy weight. He is going to try over-the-counter medication first and if is not improving will trial Adipex. History of hyperlipidemia doing well in March 2020. Will recheck in 1 year. History of cardiomyopathy, stable follow along with cardiology. Seen in follow-up 9/15/2021 by cardiology for cardiomyopathy. He was felt to be stable and improving. He was experiencing significant weight loss with dieting. The patient was seen for essential hypertension, good control continue present regimen. Hyperlipidemia continue present regimen. Morbid obesity, weight is decreasing, continue present management. We will screen for hepatitis C. Right lateral thigh pain differential diagnosis includes sciatica, meralgia paresthetica, lateral tibial band syndrome or other etiologies. At this time we will just use Tylenol or Motrin. He is able to walk at least half an hour without difficulty. If not improving or symptoms worsen we will initiate work-up. Lab results and schedule of future lab studies reviewed with patient  Diagnostic and radiologic results and the schedule of future studies were reviewed with the patient  reviewed diet, exercise and weight control  All questions were answered and understood. He was seen by virtual visit May 2020. His hypertension was well controlled. His hyperlipidemia was in good control. The patient had an unhealthy weight which was improving post increased exercise. He was to be considered for bariatric surgery if not improving. Chronic bilateral low back pain associated with neck pain treated with Aleve. The patient had an episode of acute pharyngitis. ED chest pain  Hypertension  The patient has no headaches, visual changes, chest pain or pressure,dyspnea, orthopnea, or PND.   There is no problem with the medication. Hyperlipidemia   The patient denies any myalgias or weakness. No abdominal discomfort admitted to. The patient denies any difficulty with or side effects from the medication. BP Readings from Last 3 Encounters:   09/12/22 116/60   05/03/22 136/65   03/16/22 105/60     Lab Results   Component Value Date/Time    Cholesterol, total 143 07/06/2022 12:00 AM    HDL Cholesterol 29 (L) 07/06/2022 12:00 AM    LDL, calculated 94 07/06/2022 12:00 AM    LDL, calculated 98.2 03/02/2020 03:43 PM    VLDL, calculated 20 07/06/2022 12:00 AM    VLDL, calculated 16.8 03/02/2020 03:43 PM    Triglyceride 106 07/06/2022 12:00 AM    CHOL/HDL Ratio 5.0 03/02/2020 03:43 PM       Right leg pain  Continued symptoms. Is located in the right lateral thigh region. Is described as an aching sensation. The patient is able to move the upper and lower extremities normally with apparent normal strength and ambulate well. No obvious difficulty with balance is noted. Provoked by prolonged walking. He walked a mile and a half here to the visit. It is relieved by rest.  He has to walk several miles before it begins. It does not always happen. There is no change in color in the area. He is taken Alevefor relief of the symptoms without success. There is no history of peripheral vascular disease. The patient is overweight. Overweight  The patient states that the weight has slowly increased again. Some stressed. AC has broken on I the house. He knows he is walking more Patient's diet has improved, he states. The patient denies edema of the lower extremities. Aggravating factors include having to make his own meals. .  Associated symptoms include no joint aches. No heat or cold intolerance. .  body mass index is 57.59 kg/m².   Wt Readings from Last 3 Encounters:   09/12/22 (!) 390 lb (176.9 kg)   05/03/22 (!) 366 lb (166 kg)   03/16/22 (!) 351 lb (159.2 kg)     Cardiomyopathy   The patient denies any chest pain pressure or palpitations. No orthopnea is admitted to. He was lseen by cardiology and this is considered a chronic problem that is improving. Associated symptoms include shortness of breath. Pertinent negatives include no chest pain, no abdominal pain and no headaches. His last echocardiogram was from August 2019 his ejection fraction had increased to 55 to 60%. No regional wall motion abnormality was noted. There was normal right ventricular size and function. He was felt to be improved. Asthma  The patient has a history of asthma. It is a chronic condition. No recent attacks. no daytime  asthma symptoms   no nightime asthma symptoms    Overweight  The patient states that the weight has decreased. Patient's diet improved. The patient denies edema or ascites. Obesity comorbid conditions include high cholesterol and high blood pressure   body mass index is 57.59 kg/m². Wt Readings from Last 3 Encounters:   09/12/22 (!) 390 lb (176.9 kg)   05/03/22 (!) 366 lb (166 kg)   03/16/22 (!) 351 lb (159.2 kg)     Key Obesity Meds               phentermine (ADIPEX-P) 37.5 mg tablet (Taking) Take 1 Tablet by mouth every morning. Max Daily Amount: 37.5 mg.            Health Maintenance Due   Topic Date Due    COVID-19 Vaccine (1) Never done    Pneumococcal 0-64 years (1 - PCV) Never done    DTaP/Tdap/Td series (2 - Td or Tdap) 01/01/2015    Flu Vaccine (1) 09/01/2022     Review of Systems   Constitutional:  Negative for chills and fever. HENT:  Positive for sore throat. Negative for congestion. Respiratory:  Negative for shortness of breath and wheezing. Musculoskeletal:  Positive for back pain, joint pain, myalgias and neck pain. Negative for falls. Right thigh pain on walking  Better at rest  Can walk 20 minutes   Neurological:  Positive for headaches. Psychiatric/Behavioral:  The patient is not nervous/anxious.       has Palpitations, Shortness of breath, Other specified cardiac dysrhythmias(427.89), Cardiomyopathy [facility]), Essential hypertension with goal blood pressure less than 140/90, Morbid obesity ([address] Seborrheic dermatitis, Vitamin D deficiency, Moderate persistent asthma without complication, Bilateral low back pain without sciatica, and Hyperlipidemia on their problem list.    Past Surgical History:   Procedure Laterality Date    HX ADENOIDECTOMY        reports that he has never smoked. He has never used smokeless tobacco. He reports that he does not drink alcohol and does not use drugs. family history includes Cancer in his maternal aunt; Diabetes in his mother; Heart Attack (age of onset: 48) in his father; Hypertension in his mother and sister; Stroke in his maternal grandfather and maternal grandmother. Visit Vitals  /60 (BP 1 Location: Right arm, BP Patient Position: Sitting, BP Cuff Size: Thigh)   Pulse 74   Resp 16   Ht 5' 9\" (1.753 m)   Wt (!) 390 lb (176.9 kg)   SpO2 95%   BMI 57.59 kg/m²     Wt Readings from Last 3 Encounters:   09/12/22 (!) 390 lb (176.9 kg)   05/03/22 (!) 366 lb (166 kg)   03/16/22 (!) 351 lb (159.2 kg)         Physical Exam  Vitals and nursing note reviewed. Constitutional:       General: He is not in acute distress. Appearance: He is well-developed. HENT:      Right Ear: External ear normal.      Left Ear: External ear normal.      Nose: Nose normal.   Eyes:      Pupils: Pupils are equal, round, and reactive to light. Neck:      Thyroid: No thyromegaly. Comments: Carotid bruit absent  Cardiovascular:      Rate and Rhythm: Normal rate and regular rhythm. Heart sounds: No murmur heard. No friction rub. No gallop. Pulmonary:      Effort: Pulmonary effort is normal. No respiratory distress. Breath sounds: Normal breath sounds. Abdominal:      General: There is no distension. Palpations: There is no mass.       Tenderness: no abdominal tenderness   Musculoskeletal:         General: No tenderness (Right side of the neck aggravated by rotation of the neck to the right and right lateral extension). Cervical back: Neck supple. Lymphadenopathy:      Cervical: No cervical adenopathy. Skin:     General: Skin is warm and dry. Neurological:      Mental Status: He is alert and oriented to person, place, and time. Sensory: Sensory deficit (.Monofilament test on the right lateral thigh is diminished) present. Psychiatric:      Comments: Nice gentleman, likes to tell jokes      Visit Vitals  /60 (BP 1 Location: Right arm, BP Patient Position: Sitting, BP Cuff Size: Thigh)   Pulse 74   Resp 16   Ht 5' 9\" (1.753 m)   Wt (!) 390 lb (176.9 kg)   SpO2 95%   BMI 57.59 kg/m²         Results for orders placed or performed during the hospital encounter of 07/06/22   [facility] SPECIMEN COL   Result Value Ref Range    [facility]. Specimens collected/sent to [facility]. Please direct inquiries to ([phone]). We discussed the expected course, resolution and complications of the diagnosis(es) in detail. Medication risks, benefits, costs, interactions, and alternatives were discussed as indicated. I advised him to contact the office if his condition worsens, changes or fails to improve as anticipated. He expressed understanding with the diagnosis(es) and plan. This note was done with the assistance of dragon speech software.   Some inadvertent errors or omissions may be present

## 2022-09-12 ENCOUNTER — OFFICE VISIT (OUTPATIENT)
Dept: FAMILY MEDICINE CLINIC | Age: 27
End: 2022-09-12
Payer: MEDICAID

## 2022-09-12 VITALS
HEART RATE: 74 BPM | HEIGHT: 69 IN | SYSTOLIC BLOOD PRESSURE: 116 MMHG | BODY MASS INDEX: 46.65 KG/M2 | WEIGHT: 315 LBS | OXYGEN SATURATION: 95 % | RESPIRATION RATE: 16 BRPM | DIASTOLIC BLOOD PRESSURE: 60 MMHG

## 2022-09-12 DIAGNOSIS — I10 ESSENTIAL HYPERTENSION WITH GOAL BLOOD PRESSURE LESS THAN 140/90: Primary | ICD-10-CM

## 2022-09-12 DIAGNOSIS — E66.01 MORBID OBESITY (HCC): ICD-10-CM

## 2022-09-12 DIAGNOSIS — E78.5 HYPERLIPIDEMIA, UNSPECIFIED HYPERLIPIDEMIA TYPE: ICD-10-CM

## 2022-09-12 DIAGNOSIS — M79.2 NEUROPATHIC PAIN: ICD-10-CM

## 2022-09-12 DIAGNOSIS — S16.1XXA STRAIN OF NECK MUSCLE, INITIAL ENCOUNTER: ICD-10-CM

## 2022-09-12 DIAGNOSIS — Z23 ENCOUNTER FOR IMMUNIZATION: ICD-10-CM

## 2022-09-12 PROCEDURE — 99214 OFFICE O/P EST MOD 30 MIN: CPT | Performed by: EMERGENCY MEDICINE

## 2022-09-12 RX ORDER — PHENTERMINE HYDROCHLORIDE 37.5 MG/1
37.5 TABLET ORAL
Qty: 30 TABLET | Refills: 2 | Status: SHIPPED | OUTPATIENT
Start: 2022-09-12

## 2022-09-12 RX ORDER — GABAPENTIN 100 MG/1
CAPSULE ORAL
Qty: 90 CAPSULE | Refills: 0 | Status: SHIPPED | OUTPATIENT
Start: 2022-09-12

## 2022-09-12 RX ORDER — CYCLOBENZAPRINE HCL 10 MG
TABLET ORAL
Qty: 30 TABLET | Refills: 3 | Status: CANCELLED | OUTPATIENT
Start: 2022-09-12

## 2022-09-12 NOTE — PATIENT INSTRUCTIONS
Learning About High Blood Pressure  What is high blood pressure? Blood pressure is a measure of how hard the blood pushes against the walls of your arteries. It's normal for blood pressure to go up and down throughout the day. But if it stays up, you have high blood pressure. Another name for high blood pressure is hypertension. Two numbers tell you your blood pressure. The first number is the systolic pressure (top number). It shows how hard the blood pushes when your heart is pumping. The second number is the diastolic pressure (bottom number). It shows how hard the blood pushes between heartbeats, when your heart is relaxed and filling with blood. Your doctor will give you a goal for your blood pressure based on your health and your age. High blood pressure (hypertension) means that the top number stays high, or the bottom number stays high, or both. High blood pressure increases the risk of stroke, heart attack, and other problems. What happens when you have high blood pressure? Blood flows through your arteries with too much force. Over time, this can damage the heart and the walls of your arteries. But you can't feel it. High blood pressure usually doesn't cause symptoms. High blood pressure makes your heart work harder. And that can lead to heart failure, which means your heart doesn't pump as much blood as your body needs. Fat and calcium start to build up in your arteries. This buildup is called hardening of the arteries. It can cause many problems including a heart attack and stroke. Arteries also carry blood and oxygen to organs like your eyes, kidneys, and brain. If high blood pressure damages those arteries, it can lead to vision loss, kidney disease, stroke, and a higher risk of dementia. How can you prevent high blood pressure? Stay at a healthy weight. Try to limit how much sodium you eat to less than 2,300 milligrams (mg) a day.  If you limit your sodium to 1,500 mg a day, you can lower your blood pressure even more. Buy foods that are labeled \"unsalted,\" \"sodium-free,\" or \"low-sodium. \" Foods labeled \"reduced-sodium\" and \"light sodium\" may still have too much sodium. Flavor your food with garlic, lemon juice, onion, vinegar, herbs, and spices instead of salt. Do not use soy sauce, steak sauce, onion salt, garlic salt, mustard, or ketchup on your food. Use less salt (or none) when recipes call for it. You can often use half the salt a recipe calls for without losing flavor. Be physically active. Get at least 30 minutes of exercise on most days of the week. Walking is a good choice. You also may want to do other activities, such as running, swimming, cycling, or playing tennis or team sports. Limit alcohol to 2 drinks a day for men and 1 drink a day for women. Eat plenty of fruits, vegetables, and low-fat dairy products. Eat less saturated and total fats. How is high blood pressure treated? Your doctor will suggest making lifestyle changes to help your heart. For example, your doctor may ask you to eat healthy foods, quit smoking, lose extra weight, and be more active. If lifestyle changes don't help enough, your doctor may recommend that you take medicine. When blood pressure is very high, medicines are needed to lower it. Follow-up care is a key part of your treatment and safety. Be sure to make and go to all appointments, and call your doctor if you are having problems. It's also a good idea to know your test results and keep a list of the medicines you take. Where can you learn more? Go to http://www.anderson.com/  Enter P501 in the search box to learn more about \"Learning About High Blood Pressure. \"  Current as of: January 10, 2022               Content Version: 13.2  © 2006-2022 Healthwise, Incorporated. Care instructions adapted under license by [employer] (which disclaims liability or warranty for this information).  If you have questions about a medical condition or this instruction, always ask your healthcare professional. [address] any warranty or liability for your use of this information. Hyperlipidemia: After Your Visit  Your Care Instructions  Hyperlipidemia is too much fat in your blood. The body has several kinds of fat, including cholesterol and triglycerides. Your body needs fat for many things, such as making new cells. But too much fat in your blood increases your chances of having a heart attack or stroke. You may be able to lower your cholesterol and triglycerides with a heart-healthy diet, exercise, and if needed, medicine. Your doctor may want you to try lifestyle changes first to see whether they lower the fat in your blood. You may need to take medicine if lifestyle changes do not lower the fat in your blood enough. Follow-up care is a key part of your treatment and safety. Be sure to make and go to all appointments, and call your doctor if you are having problems. Its also a good idea to know your test results and keep a list of the medicines you take. How can you care for yourself at home? Take your medicines  Take your medicines exactly as prescribed. Call your doctor if you think you are having a problem with your medicine. If you take medicine to lower your cholesterol, go to follow-up visits. You will need to have blood tests. Do not take large doses of niacin, which is a B vitamin, while taking medicine called statins. It may increase the chance of muscle pain and liver problems. Talk to your doctor about avoiding grapefruit juice if you are taking statins. Grapefruit juice can raise the level of this medicine in your blood. This could increase side effects. Eat more fruits, vegetables, and fiber  Fruits and vegetables have lots of nutrients that help protect against heart disease, and they have little--if any--fat. Try to eat at least five servings a day.  Dark green, deep orange, or yellow fruits and vegetables are healthy choices. Keep carrots, celery, and other veggies handy for snacks. Buy fruit that is in season and store it where you can see it so that you will be tempted to eat it. Cook dishes that have a lot of veggies in them, such as stir-fries and soups. Foods high in fiber may reduce your cholesterol and provide important vitamins and minerals. High-fiber foods include whole-grain cereals and breads, oatmeal, beans, brown rice, citrus fruits, and apples. Buy whole-grain breads and cereals instead of white bread and pastries. Limit saturated fat  Read food labels and try to avoid saturated fat and trans fat. They increase your risk of heart disease. Use olive or canola oil when you cook. Try cholesterol-lowering spreads, such as Benecol or Take Control. Bake, broil, grill, or steam foods instead of frying them. Limit the amount of high-fat meats you eat, including hot dogs and sausages. Cut out all visible fat when you prepare meat. Eat fish, skinless poultry, and soy products such as tofu instead of high-fat meats. Soybeans may be especially good for your heart. Eat at least two servings of fish a week. Certain fish, such as salmon, contain omega-3 fatty acids, which may help reduce your risk of heart attack. Choose low-fat or fat-free milk and dairy products. Get exercise, limit alcohol, and quit smoking  Get more exercise. Work with your doctor to set up an exercise program. Even if you can do only a small amount, exercise will help you get stronger, have more energy, and manage your weight and your stress. Walking is an easy way to get exercise. Gradually increase the amount you walk every day. Aim for at least 30 minutes on most days of the week. You also may want to swim, bike, or do other activities. Limit alcohol to no more than 2 drinks a day for men and 1 drink a day for women. Do not smoke.  If you need help quitting, talk to your doctor about stop-smoking programs and medicines. These can increase your chances of quitting for good. When should you call for help? Call 911 anytime you think you may need emergency care. For example, call if:  You have symptoms of a heart attack. These may include:  Chest pain or pressure, or a strange feeling in the chest.  Sweating. Shortness of breath. Nausea or vomiting. Pain, pressure, or a strange feeling in the back, neck, jaw, or upper belly or in one or both shoulders or arms. Lightheadedness or sudden weakness. A fast or irregular heartbeat. After you call 911, the  may tell you to chew 1 adult-strength or 2 to 4 low-dose aspirin. Wait for an ambulance. Do not try to drive yourself. You have signs of a stroke. These may include:  Sudden numbness, paralysis, or weakness in your face, arm, or leg, especially on only one side of your body. New problems with walking or balance. Sudden vision changes. Drooling or slurred speech. New problems speaking or understanding simple statements, or feeling confused. A sudden, severe headache that is different from past headaches. You passed out (lost consciousness). Call your doctor now or seek immediate medical care if:  You have muscle pain or weakness. Watch closely for changes in your health, and be sure to contact your doctor if:  You are very tired. You have an upset stomach, gas, constipation, or belly pain or cramps. Where can you learn more? Go to [place]  Enter C406 in the search box to learn more about \"Hyperlipidemia: After Your Visit. \"   © 4807-9914 Healthwise, Incorporated. Care instructions adapted under license by [facility] [employer] (which disclaims liability or warranty for this information).  This care instruction is for use with your licensed healthcare professional. If you have questions about a medical condition or this instruction, always ask your healthcare professional. Saurabh Lua disclaims any warranty or liability for your use of this information. Content Version: 2.2.773161; Last Revised: October 13, 2011                 A Healthy Lifestyle: Care Instructions  Your Care Instructions     A healthy lifestyle can help you feel good, stay at a healthy weight, and have plenty of energy for both work and play. A healthy lifestyle is something you can share with your whole family. A healthy lifestyle also can lower your risk for serious health problems, such as high blood pressure, heart disease, and diabetes. You can follow a few steps listed below to improve your health and the health of your family. Follow-up care is a key part of your treatment and safety. Be sure to make and go to all appointments, and call your doctor if you are having problems. It's also a good idea to know your test results and keep a list of the medicines you take. How can you care for yourself at home? Do not eat too much sugar, fat, or fast foods. You can still have dessert and treats now and then. The goal is moderation. Start small to improve your eating habits. Pay attention to portion sizes, drink less juice and soda pop, and eat more fruits and vegetables. Eat a healthy amount of food. A 3-ounce serving of meat, for example, is about the size of a deck of cards. Fill the rest of your plate with vegetables and whole grains. Limit the amount of soda and sports drinks you have every day. Drink more water when you are thirsty. Eat plenty of fruits and vegetables every day. Have an apple or some carrot sticks as an afternoon snack instead of a candy bar. Try to have fruits and/or vegetables at every meal.  Make exercise part of your daily routine. You may want to start with simple activities, such as walking, bicycling, or slow swimming. Try to be active 30 to 60 minutes every day. You do not need to do all 30 to 60 minutes all at once. For example, you can exercise 3 times a day for 10 or 20 minutes.  Moderate exercise is safe for most people, but it is always a good idea to talk to your doctor before starting an exercise program.  Keep moving. Adolfo Free the lawn, work in the garden, or [employer]. Take the stairs instead of the elevator at work. If you smoke, quit. People who smoke have an increased risk for heart attack, stroke, cancer, and other lung illnesses. Quitting is hard, but there are ways to boost your chance of quitting tobacco for good. Use nicotine gum, patches, or lozenges. Ask your doctor about stop-smoking programs and medicines. Keep trying. In addition to reducing your risk of diseases in the future, you will notice some benefits soon after you stop using tobacco. If you have shortness of breath or asthma symptoms, they will likely get better within a few weeks after you quit. Limit how much alcohol you drink. Moderate amounts of alcohol (up to 2 drinks a day for men, 1 drink a day for women) are okay. But drinking too much can lead to liver problems, high blood pressure, and other health problems. Family health  If you have a family, there are many things you can do together to improve your health. Eat meals together as a family as often as possible. Eat healthy foods. This includes fruits, vegetables, lean meats and dairy, and whole grains. Include your family in your fitness plan. Most people think of activities such as jogging or tennis as the way to fitness, but there are many ways you and your family can be more active. Anything that makes you breathe hard and gets your heart pumping is exercise. Here are some tips:  Walk to do errands or to take your child to school or the bus. Go for a family bike ride after dinner instead of watching TV. Where can you learn more? Go to http://www.gray.com/  Enter P615 in the search box to learn more about \"A Healthy Lifestyle: Care Instructions. \"  Current as of: June 16, 2021               Content Version: 13.2  © 3313-9378 Healthwise, Incorporated. Care instructions adapted under license by [employer] (which disclaims liability or warranty for this information). If you have questions about a medical condition or this instruction, always ask your healthcare professional. Bandar Stephens any warranty or liability for your use of this information.

## 2022-09-12 NOTE — PROGRESS NOTES
1. \"Have you been to the ER, urgent care clinic since your last visit? Hospitalized since your last visit? \" No    2. \"Have you seen or consulted any other health care providers outside of the [address] [place] [address] since your last visit? \" No     3. For patients aged 39-70: Has the patient had a colonoscopy / FIT/ Cologuard? NA - based on age      If the patient is female:    4. For patients aged 41-77: Has the patient had a mammogram within the past 2 years? NA - based on age or sex      11. For patients aged 21-65: Has the patient had a pap smear?  NA - based on age or sex

## 2022-09-16 ENCOUNTER — OFFICE VISIT (OUTPATIENT)
Dept: CARDIOLOGY CLINIC | Age: 27
End: 2022-09-16
Payer: MEDICAID

## 2022-09-16 VITALS
WEIGHT: 315 LBS | DIASTOLIC BLOOD PRESSURE: 63 MMHG | OXYGEN SATURATION: 98 % | SYSTOLIC BLOOD PRESSURE: 108 MMHG | HEIGHT: 69 IN | HEART RATE: 82 BPM | BODY MASS INDEX: 46.65 KG/M2

## 2022-09-16 DIAGNOSIS — I10 ESSENTIAL HYPERTENSION WITH GOAL BLOOD PRESSURE LESS THAN 140/90: Primary | ICD-10-CM

## 2022-09-16 DIAGNOSIS — I42.9 CARDIOMYOPATHY, UNSPECIFIED TYPE (HCC): ICD-10-CM

## 2022-09-16 DIAGNOSIS — E66.01 MORBID OBESITY (HCC): ICD-10-CM

## 2022-09-16 DIAGNOSIS — E78.5 HYPERLIPIDEMIA, UNSPECIFIED HYPERLIPIDEMIA TYPE: ICD-10-CM

## 2022-09-16 PROCEDURE — 99214 OFFICE O/P EST MOD 30 MIN: CPT | Performed by: INTERNAL MEDICINE

## 2022-09-16 NOTE — PROGRESS NOTES
HISTORY OF PRESENT ILLNESS  Vandana Sandra is a 28 y.o. male. Follow-up  Associated symptoms include shortness of breath. Pertinent negatives include no chest pain, no abdominal pain and no headaches. Cardiomyopathy  The history is provided by the Patient. This is a chronic problem. The problem has been resolved. Associated symptoms include shortness of breath. Pertinent negatives include no chest pain, no abdominal pain and no headaches. Hypertension  The history is provided by the Patient. This is a chronic problem. The problem has not changed since onset. Associated symptoms include shortness of breath. Pertinent negatives include no chest pain, no abdominal pain and no headaches. Shortness of Breath  The history is provided by the Patient. This is a recurrent problem. The problem occurs intermittently. The problem has been rapidly improving. Pertinent negatives include no fever, no headaches, no cough, no sputum production, no hemoptysis, no wheezing, no PND, no orthopnea, no chest pain, no vomiting, no abdominal pain, no rash, no leg swelling and no claudication. Review of Systems   Constitutional:  Negative for chills and fever. HENT:  Negative for nosebleeds. Eyes:  Negative for blurred vision and double vision. Respiratory:  Positive for shortness of breath. Negative for cough, hemoptysis, sputum production and wheezing. Cardiovascular:  Negative for chest pain, palpitations, orthopnea, claudication, leg swelling and PND. Gastrointestinal:  Negative for abdominal pain, heartburn, nausea and vomiting. Musculoskeletal:  Negative for myalgias. Skin:  Negative for rash. Neurological:  Negative for dizziness, weakness and headaches. Endo/Heme/Allergies:  Does not bruise/bleed easily.    Family History   Problem Relation Age of Onset    Diabetes Mother     Hypertension Mother     Heart Attack Father 48    Hypertension Sister     Cancer Maternal Aunt     Stroke Maternal Grandmother Stroke Maternal Grandfather     Heart Surgery Neg Hx        Past Medical History:   Diagnosis Date    Asthma 5/5/2014    possible asthma r/o cardiac etiology h/o chest trauma as a child     Hypertension     Knee pain, right     Other specified cardiac dysrhythmias(427.89) 5/5/2014    marked sinus bradycardia     Palpitations 5/5/2014    r/o arrythmia     Shortness of breath 5/5/2014    possible asthma r/o cardiac etiology h/o chest trauma as a child        Past Surgical History:   Procedure Laterality Date    HX ADENOIDECTOMY         Social History     Tobacco Use    Smoking status: Never    Smokeless tobacco: Never   Substance Use Topics    Alcohol use: No     Alcohol/week: 0.0 standard drinks       Allergies   Allergen Reactions    Penicillins Unable to Obtain           Visit Vitals  /63   Pulse 82   Ht 5' 9\" (1.753 m)   Wt (!) 177.4 kg (391 lb)   SpO2 98%   BMI 57.74 kg/m²         Physical Exam  Constitutional:       Appearance: He is well-developed. HENT:      Head: Normocephalic and atraumatic. Eyes:      Conjunctiva/sclera: Conjunctivae normal.   Neck:      Thyroid: No thyromegaly. Vascular: No JVD. Trachea: No tracheal deviation. Cardiovascular:      Rate and Rhythm: Normal rate and regular rhythm. Heart sounds: Normal heart sounds. No murmur heard. No friction rub. No gallop. Pulmonary:      Effort: No respiratory distress. Breath sounds: Normal breath sounds. No wheezing or rales. Chest:      Chest wall: No tenderness. Abdominal:      Palpations: Abdomen is soft. Tenderness: There is no abdominal tenderness. Musculoskeletal:      Cervical back: Neck supple. Skin:     General: Skin is warm and dry. Neurological:      Mental Status: He is alert and oriented to person, place, and time. Mr. Kenton Kumari has a reminder for a \"due or due soon\" health maintenance.  I have asked that he contact his primary care provider for follow-up on this health maintenance. No flowsheet data found. I have personally reviewed patient's records available from hospital and other providers and incorporated findings in patient care. SUMMARY:echo:12/2015  Left ventricle: Systolic function was normal. Ejection fraction was  estimated to be 55 %. There were no regional wall motion abnormalities. Left ventricular diastolic function parameters were normal.    COMPARISONS:  Comparison was made with the previous study of 02-Jun-2015. LV overall  function has increased from 45 % to 55 %  Interpretation Summary 8/2019       Left Ventricle: Normal cavity size, systolic function (ejection fraction normal) and diastolic function. Moderate concentric hypertrophy. Estimated left ventricular ejection fraction is 56 - 60%. No regional wall motion abnormality noted. Right Ventricle: Normal right ventricular size and function. No hemodynamically significant valvular pathology. I Have personally reviewed recent relevant labs available and discussed with patient  6/2019-BMP and lipids  6/2021  CBC, BMP, EKG, x-ray  1/2022-BMP  7/2022-lipid-HDL 29  Assessment         ICD-10-CM ICD-9-CM    1. Essential hypertension with goal blood pressure less than 140/90  I10 401.9 ECHO ADULT COMPLETE    Stable continue treatment monitor      2. Cardiomyopathy, unspecified type ([address]  I42.9 425.4 ECHO ADULT COMPLETE    Stable monitor continue therapy      3. Hyperlipidemia, unspecified hyperlipidemia type  E78.5 272.4     Continue treatment lab with PCP      4. Morbid obesity ([address]  E66.01 278.01     Continue dietary modification and exercise      2/2021  Patient seen for follow-up. Cardiomyopathy stable with improving function tolerating beta-blocker and lisinopril. Continue statin check labs. Continue diet and exercise  9/2021  Cardiac status stable. ER visit in 6/2021 unremarkable labs and EKG. Blood pressure controlled.   Has significant weight loss with dieting continue treatment  3/2022  Cardiac status stable. Blood pressure controlled. Continue dietary modification and weight  9/2022  Cardiac status stable continue current medical management continue dietary modification and exercise. Has significant weight gain. Follow-up echo for cardiomyopathy  Orders Placed This Encounter    ECHO ADULT COMPLETE     Standing Status:   Future     Standing Expiration Date:   9/16/2023     Order Specific Question:   Contrast Enhancement (Bubble Study, Definity, Optison) may be used if criteria listed in established evidence-based protocol has been identified. Answer:   Yes       Follow-up and Dispositions    Return for F/u after tests, Follow-up with Faina.

## 2022-09-16 NOTE — PROGRESS NOTES
1. Have you been to the ER, urgent care clinic since your last visit? Hospitalized since your last visit? No    2. Have you seen or consulted any other health care providers outside of the [address] [place] [address] since your last visit? Include any pap smears or colon screening.       No

## 2022-10-18 ENCOUNTER — OFFICE VISIT (OUTPATIENT)
Dept: CARDIOLOGY CLINIC | Age: 27
End: 2022-10-18
Payer: MEDICAID

## 2022-10-18 VITALS
DIASTOLIC BLOOD PRESSURE: 73 MMHG | HEIGHT: 69 IN | SYSTOLIC BLOOD PRESSURE: 126 MMHG | BODY MASS INDEX: 46.65 KG/M2 | OXYGEN SATURATION: 100 % | HEART RATE: 60 BPM | WEIGHT: 315 LBS

## 2022-10-18 DIAGNOSIS — I10 ESSENTIAL HYPERTENSION WITH GOAL BLOOD PRESSURE LESS THAN 140/90: ICD-10-CM

## 2022-10-18 DIAGNOSIS — I42.9 CARDIOMYOPATHY, UNSPECIFIED TYPE (HCC): Primary | ICD-10-CM

## 2022-10-18 DIAGNOSIS — E78.5 HYPERLIPIDEMIA, UNSPECIFIED HYPERLIPIDEMIA TYPE: ICD-10-CM

## 2022-10-18 DIAGNOSIS — E66.01 MORBID OBESITY (HCC): ICD-10-CM

## 2022-10-18 PROCEDURE — 99214 OFFICE O/P EST MOD 30 MIN: CPT | Performed by: NURSE PRACTITIONER

## 2022-10-18 NOTE — PROGRESS NOTES
HISTORY OF PRESENT ILLNESS  Ray Lopez is a 28 y.o. male. 10/2022  Patient presents to follow-up for testing results. He denies chest pain, shortness of breath, palpitations or edema. He reports is walking for exercise without symptoms    Follow-up  The history is provided by the Patient and medical records. Pertinent negatives include no chest pain, no abdominal pain, no headaches and no shortness of breath. Cardiomyopathy  The history is provided by the Patient. This is a chronic problem. The problem has been resolved. Pertinent negatives include no chest pain, no abdominal pain, no headaches and no shortness of breath. Hypertension  The history is provided by the Patient. This is a chronic problem. The problem has not changed since onset. Pertinent negatives include no chest pain, no abdominal pain, no headaches and no shortness of breath. Shortness of Breath  The history is provided by the Patient. This is a recurrent problem. The problem occurs intermittently. The problem has been rapidly improving. Pertinent negatives include no fever, no headaches, no cough, no sputum production, no hemoptysis, no wheezing, no PND, no orthopnea, no chest pain, no vomiting, no abdominal pain, no rash, no leg swelling and no claudication. Review of Systems   Constitutional:  Negative for chills and fever. HENT:  Negative for nosebleeds. Eyes:  Negative for blurred vision and double vision. Respiratory:  Negative for cough, hemoptysis, sputum production, shortness of breath and wheezing. Cardiovascular:  Negative for chest pain, palpitations, orthopnea, claudication, leg swelling and PND. Gastrointestinal:  Negative for abdominal pain, heartburn, nausea and vomiting. Musculoskeletal:  Negative for myalgias. Skin:  Negative for rash. Neurological:  Negative for dizziness, weakness and headaches. Endo/Heme/Allergies:  Does not bruise/bleed easily.    Family History   Problem Relation Age of Onset    Diabetes Mother     Hypertension Mother     Heart Attack Father 48    Hypertension Sister     Cancer Maternal Aunt     Stroke Maternal Grandmother     Stroke Maternal Grandfather     Heart Surgery Neg Hx        Past Medical History:   Diagnosis Date    Asthma 5/5/2014    possible asthma r/o cardiac etiology h/o chest trauma as a child     Hypertension     Knee pain, right     Other specified cardiac dysrhythmias(427.89) 5/5/2014    marked sinus bradycardia     Palpitations 5/5/2014    r/o arrythmia     Shortness of breath 5/5/2014    possible asthma r/o cardiac etiology h/o chest trauma as a child        Past Surgical History:   Procedure Laterality Date    HX ADENOIDECTOMY         Social History     Tobacco Use    Smoking status: Never    Smokeless tobacco: Never   Substance Use Topics    Alcohol use: No     Alcohol/week: 0.0 standard drinks       Allergies   Allergen Reactions    Penicillins Unable to Obtain           Visit Vitals  /73   Pulse 60   Ht 5' 9\" (1.753 m)   Wt (!) 173.3 kg (382 lb)   SpO2 100%   BMI 56.41 kg/m²         Physical Exam  Vitals and nursing note reviewed. Constitutional:       Appearance: He is well-developed. He is obese. HENT:      Head: Normocephalic and atraumatic. Eyes:      Conjunctiva/sclera: Conjunctivae normal.   Neck:      Thyroid: No thyromegaly. Vascular: No JVD. Trachea: No tracheal deviation. Cardiovascular:      Rate and Rhythm: Normal rate and regular rhythm. Heart sounds: Normal heart sounds. No murmur heard. No friction rub. No gallop. Pulmonary:      Effort: No respiratory distress. Breath sounds: Normal breath sounds. No wheezing, rhonchi or rales. Chest:      Chest wall: No tenderness. Abdominal:      Palpations: Abdomen is soft. Tenderness: There is no abdominal tenderness. Musculoskeletal:      Cervical back: Neck supple. Right lower leg: No edema. Left lower leg: No edema.    Skin:     General: Skin is warm and dry. Neurological:      Mental Status: He is alert and oriented to person, place, and time. Mr. Eugenia Nolasco has a reminder for a \"due or due soon\" health maintenance. I have asked that he contact his primary care provider for follow-up on this health maintenance. No flowsheet data found. I have personally reviewed patient's records available from hospital and other providers and incorporated findings in patient care. SUMMARY:echo:12/2015  Left ventricle: Systolic function was normal. Ejection fraction was  estimated to be 55 %. There were no regional wall motion abnormalities. Left ventricular diastolic function parameters were normal.    COMPARISONS:  Comparison was made with the previous study of 02-Jun-2015. LV overall  function has increased from 45 % to 55 %  Interpretation Summary 8/2019       Left Ventricle: Normal cavity size, systolic function (ejection fraction normal) and diastolic function. Moderate concentric hypertrophy. Estimated left ventricular ejection fraction is 56 - 60%. No regional wall motion abnormality noted. Right Ventricle: Normal right ventricular size and function. No hemodynamically significant valvular pathology. Echo   9/2022  Interpretation Summary         Technical qualifiers: Echo study was technically difficult with poor endocardial visualization and technically difficult due to patient's body habitus. Contrast used: Definity. Left Ventricle: Normal left ventricular systolic function with a visually estimated EF of 55 - 60%. Not well visualized. Normal wall motion. Normal diastolic function. I Have personally reviewed recent relevant labs available and discussed with patient  6/2019-BMP and lipids  6/2021  CBC, BMP, EKG, x-ray  1/2022-BMP  7/2022-lipid-HDL 29  Assessment         ICD-10-CM ICD-9-CM    1. Cardiomyopathy, unspecified type ([facility] [address]  I42.9 425.4     Stable monitor continue therapy      2.  Essential hypertension with goal blood pressure less than 140/90  I10 401.9     Stable continue treatment monitor      3. Hyperlipidemia, unspecified hyperlipidemia type  E78.5 272.4     Continue treatment lab with PCP      4. Morbid obesity ([address]  E66.01 278.01     Continue dietary modification and exercise      2/2021  Patient seen for follow-up. Cardiomyopathy stable with improving function tolerating beta-blocker and lisinopril. Continue statin check labs. Continue diet and exercise  9/2021  Cardiac status stable. ER visit in 6/2021 unremarkable labs and EKG. Blood pressure controlled. Has significant weight loss with dieting continue treatment  3/2022  Cardiac status stable. Blood pressure controlled. Continue dietary modification and weight  9/2022  Cardiac status stable continue current medical management continue dietary modification and exercise. Has significant weight gain. Follow-up echo for cardiomyopathy  10/2022  Cardiac status stable patient is walking for exercise without symptoms he has lost 9 pounds for which he was congratulated. Echocardiogram reviewed and discussed with patient normal LV function. Blood pressure is controlled we will continue current medications recommend diet and weight loss efforts. No orders of the defined types were placed in this encounter. Follow-up and Dispositions    Return in about 6 months (around 4/18/2023) for Follow up with Dr. Kee Gerber.

## 2022-10-18 NOTE — PROGRESS NOTES
1. Have you been to the ER, urgent care clinic since your last visit? Hospitalized since your last visit?     no    2. Have you seen or consulted any other health care providers outside of the [address] [place] [address] since your last visit? Include any pap smears or colon screening.       No

## 2022-12-13 ENCOUNTER — HOSPITAL ENCOUNTER (OUTPATIENT)
Dept: LAB | Age: 27
Discharge: HOME | End: 2022-12-13
Payer: MEDICAID

## 2022-12-13 DIAGNOSIS — I10 ESSENTIAL HYPERTENSION WITH GOAL BLOOD PRESSURE LESS THAN 140/90: ICD-10-CM

## 2022-12-13 DIAGNOSIS — E78.5 HYPERLIPIDEMIA, UNSPECIFIED HYPERLIPIDEMIA TYPE: ICD-10-CM

## 2022-12-13 LAB
ANION GAP SERPL CALC-SCNC: 8 MMOL/L (ref 3–18)
BASOPHILS # BLD: 0 K/UL (ref 0–0.1)
BASOPHILS NFR BLD: 0 % (ref 0–2)
BUN SERPL-MCNC: 20 MG/DL (ref 7–18)
BUN/CREAT SERPL: 22 (ref 12–20)
CALCIUM SERPL-MCNC: 9.1 MG/DL (ref 8.5–10.1)
CHLORIDE SERPL-SCNC: 107 MMOL/L (ref 100–111)
CHOLEST SERPL-MCNC: 174 MG/DL
CO2 SERPL-SCNC: 25 MMOL/L (ref 21–32)
CREAT SERPL-MCNC: 0.92 MG/DL (ref 0.6–1.3)
DIFFERENTIAL METHOD BLD: ABNORMAL
EOSINOPHIL # BLD: 0.1 K/UL (ref 0–0.4)
EOSINOPHIL NFR BLD: 1 % (ref 0–5)
ERYTHROCYTE [DISTWIDTH] IN BLOOD BY AUTOMATED COUNT: 13.5 % (ref 11.6–14.5)
GLUCOSE SERPL-MCNC: 85 MG/DL (ref 74–99)
HCT VFR BLD AUTO: 43.3 % (ref 36–48)
HDLC SERPL-MCNC: 30 MG/DL (ref 40–60)
HDLC SERPL: 5.8 {RATIO} (ref 0–5)
HGB BLD-MCNC: 14.5 G/DL (ref 13–16)
IMM GRANULOCYTES # BLD AUTO: 0 K/UL (ref 0–0.04)
IMM GRANULOCYTES NFR BLD AUTO: 0 % (ref 0–0.5)
LDLC SERPL CALC-MCNC: 125.2 MG/DL (ref 0–100)
LIPID PROFILE,FLP: ABNORMAL
LYMPHOCYTES # BLD: 3.2 K/UL (ref 0.9–3.6)
LYMPHOCYTES NFR BLD: 31 % (ref 21–52)
MCH RBC QN AUTO: 29.5 PG (ref 24–34)
MCHC RBC AUTO-ENTMCNC: 33.5 G/DL (ref 31–37)
MCV RBC AUTO: 88 FL (ref 78–100)
MONOCYTES # BLD: 0.8 K/UL (ref 0.05–1.2)
MONOCYTES NFR BLD: 8 % (ref 3–10)
NEUTS SEG # BLD: 6.2 K/UL (ref 1.8–8)
NEUTS SEG NFR BLD: 60 % (ref 40–73)
NRBC # BLD: 0 K/UL (ref 0–0.01)
NRBC BLD-RTO: 0 PER 100 WBC
PLATELET # BLD AUTO: 460 K/UL (ref 135–420)
PMV BLD AUTO: 11.1 FL (ref 9.2–11.8)
POTASSIUM SERPL-SCNC: 4.2 MMOL/L (ref 3.5–5.5)
RBC # BLD AUTO: 4.92 M/UL (ref 4.35–5.65)
SODIUM SERPL-SCNC: 140 MMOL/L (ref 136–145)
TRIGL SERPL-MCNC: 94 MG/DL (ref ?–150)
VLDLC SERPL CALC-MCNC: 18.8 MG/DL
WBC # BLD AUTO: 10.3 K/UL (ref 4.6–13.2)

## 2022-12-13 PROCEDURE — 36415 COLL VENOUS BLD VENIPUNCTURE: CPT

## 2022-12-13 PROCEDURE — 80048 BASIC METABOLIC PNL TOTAL CA: CPT

## 2022-12-13 PROCEDURE — 85025 COMPLETE CBC W/AUTO DIFF WBC: CPT

## 2022-12-13 PROCEDURE — 80061 LIPID PANEL: CPT

## 2022-12-18 NOTE — PROGRESS NOTES
ICD-10-CM ICD-9-CM    1. Essential hypertension with goal blood pressure less than 140/90  I10 401.9       2. Hyperlipidemia, unspecified hyperlipidemia type  E78.5 272.4       3. Morbid obesity ([address]  E66.01 278.01       4. Neuropathic pain  M79.2 729.2             Assessment and plan  1. Essential hypertension, good control no change in treatment. 2.  Hyperlipidemia. Adequate control continue present regimen recheck before next visit. 3.  Morbid obesity. Losing, continue present regimen. No medication at this time. 4.  Neuropathic pain unchanged continue present regimen. 5.  Moderate stable asthma no change doing well at this time. Diagnostics 12/13/2022 CBC normal except for platelet count of 875,206. BMP unremarkable except for BUN of 20. Cholesterol 174 LDL cholesterol 125.2  Patient had an echocardiogram on 10/11/2022 the LVEF with 55 to 60% not well visualized because of body habitus. There appeared to be normal wall motion and normal diastolic function. Lab results and schedule of future lab studies reviewed with patient  Reviewed diet, exercise and weight control  All questions answered and understood. Follow-up 4 months  Diagnostics before next visit  Health Maintenance Due   Topic Date Due    COVID-19 Vaccine (1) Never done    Flu Vaccine (1) 08/01/2022       Subjective:   Lkaia Jorgensen is a 28 y.o. male has Palpitations, Shortness of breath, Other specified cardiac dysrhythmias(427.89), Cardiomyopathy ([address] Essential hypertension with goal blood pressure less than 140/90, Morbid obesity ([address] Seborrheic dermatitis, Vitamin D deficiency, Moderate persistent asthma without complication, Bilateral low back pain without sciatica, and Hyperlipidemia on their problem list..Follow Up Chronic Condition     Seen previously by our practice on 9/12/2022  The primary encounter diagnosis was Essential hypertension with goal blood pressure less than 140/90.  Diagnoses of Hyperlipidemia, unspecified hyperlipidemia type, Morbid obesity ([address] and Neuropathic pain were also pertinent to this visit. Cardiology follow-up 9/16/2022. Seen for shortness of breath. History of cardiomyopathy and hypertension. An echocardiogram was ordered. Cardiology follow-up 10/18/2022. Reviewed the echocardiogram which is reassuring. Patient was able to walk with exercise. He had lost 9 pounds. Blood pressure was well controlled. Diet and weight loss recommended. Seen previously by our practice on 5/3/2022. The primary encounter diagnosis was Essential hypertension with goal blood pressure less than 140/90. Diagnoses of Hyperlipidemia, unspecified hyperlipidemia type, Morbid obesity ([address] and Neuropathic pain were also pertinent to this visit. Knee pain  Chronic problemThis is a chronic problem. It is unchanged. The problem is in good control. There are no new aggravating or relieving factors. There is no history of any new associated signs, symptoms, or complications. The treatment is unchanged and there are no side effects from it. Seen previously by our practice on 5/3/2022  The primary encounter diagnosis was Essential hypertension with goal blood pressure less than 140/90. Diagnoses of Hyperlipidemia, unspecified hyperlipidemia type, Morbid obesity ([address] and Neuropathic pain were also pertinent to this visit. The last visit was 1/25/2022  New labs are pending. Essential hypertension good control, continue present regimen. Hyperlipidemia good control on last visit recheck before next visit. Continue present regimen. Good weight loss from primarily walking. Continue present regimen. Moderate asthma but no recent exacerbations. Continue present medications. No worsening of the knee or leg pain at this time. Follow. CBC within normal limits last BMP was within normal limits and hep C was negative. Patient has previously received the Covid vaccines  The last visit was 10/26/2021.   Essential hypertension, doing well continue present regimen. 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 Will trial low-dose gabapentin at nighttime 200 mg to start with. Should improve his weight decreases also. Patient has an unhealthy weight. He is going to try over-the-counter medication first and if is not improving will trial Adipex. History of hyperlipidemia doing well in March 2020. Will recheck in 1 year. History of cardiomyopathy, stable follow along with cardiology. Seen in follow-up 9/15/2021 by cardiology for cardiomyopathy. He was felt to be stable and improving. He was experiencing significant weight loss with dieting. The patient was seen for essential hypertension, good control continue present regimen. Hyperlipidemia continue present regimen. Morbid obesity, weight is decreasing, continue present management. We will screen for hepatitis C. Right lateral thigh pain differential diagnosis includes sciatica, meralgia paresthetica, lateral tibial band syndrome or other etiologies. At this time we will just use Tylenol or Motrin. He is able to walk at least half an hour without difficulty. If not improving or symptoms worsen we will initiate work-up. Lab results and schedule of future lab studies reviewed with patient  Diagnostic and radiologic results and the schedule of future studies were reviewed with the patient  reviewed diet, exercise and weight control  All questions were answered and understood. He was seen by virtual visit May 2020. His hypertension was well controlled. His hyperlipidemia was in good control. The patient had an unhealthy weight which was improving post increased exercise. He was to be considered for bariatric surgery if not improving. Chronic bilateral low back pain associated with neck pain treated with Aleve. The patient had an episode of acute pharyngitis.   ED chest pain  Hypertension  The patient has no headaches, visual changes, chest pain or pressure,dyspnea, orthopnea, or PND. There is no problem with the medication. Hyperlipidemia   The patient denies any myalgias or weakness. No abdominal discomfort admitted to. The patient denies any difficulty with or side effects from the medication. BP Readings from Last 3 Encounters:   01/09/23 (!) 143/67   10/18/22 126/73   10/11/22 108/63     Lab Results   Component Value Date/Time    Cholesterol, total 174 12/13/2022 11:59 AM    HDL Cholesterol 30 (L) 12/13/2022 11:59 AM    LDL, calculated 125.2 (H) 12/13/2022 11:59 AM    VLDL, calculated 18.8 12/13/2022 11:59 AM    Triglyceride 94 12/13/2022 11:59 AM    CHOL/HDL Ratio 5.8 (H) 12/13/2022 11:59 AM       Right leg pain  Continued symptoms. Is located in the right lateral thigh region. Is described as an aching sensation. The patient is able to move the upper and lower extremities normally with apparent normal strength and ambulate well. No obvious difficulty with balance is noted. Provoked by prolonged walking. He walked a mile and a half here to the visit. It is relieved by rest.  He has to walk several miles before it begins. It does not always happen. There is no change in color in the area. He is taken Alevefor relief of the symptoms without success. There is no history of peripheral vascular disease. The patient is overweight. Overweight  The patient states that the weight has slowly increased again. Some stressed.  has broken on I the house. He knows he is walking more Patient's diet has improved, he states. The patient denies edema of the lower extremities. Aggravating factors include having to make his own meals. .  Associated symptoms include no joint aches. No heat or cold intolerance. .  body mass index is 54.2 kg/m².   Wt Readings from Last 3 Encounters:   01/09/23 (!) 367 lb (166.5 kg)   10/18/22 (!) 382 lb (173.3 kg)   10/11/22 (!) 391 lb (177.4 kg)     Cardiomyopathy   The patient denies any chest pain pressure or palpitations. No orthopnea is admitted to. He was lseen by cardiology and this is considered a chronic problem that is improving. Associated symptoms include shortness of breath. Pertinent negatives include no chest pain, no abdominal pain and no headaches. His last echocardiogram was from August 2019 his ejection fraction had increased to 55 to 60%. No regional wall motion abnormality was noted. There was normal right ventricular size and function. He was felt to be improved. Asthma  The patient has a history of asthma. It is a chronic condition. No recent attacks. no daytime  asthma symptoms   no nightime asthma symptoms    Overweight  The patient states that the weight has decreased. Patient's diet improved. The patient denies edema or ascites. Obesity comorbid conditions include high cholesterol and high blood pressure   body mass index is 54.2 kg/m². Wt Readings from Last 3 Encounters:   01/09/23 (!) 367 lb (166.5 kg)   10/18/22 (!) 382 lb (173.3 kg)   10/11/22 (!) 391 lb (177.4 kg)     Key Obesity Meds               phentermine (ADIPEX-P) 37.5 mg tablet (Taking) Take 1 Tablet by mouth every morning. Max Daily Amount: 37.5 mg.            Health Maintenance Due   Topic Date Due    COVID-19 Vaccine (1) Never done    Flu Vaccine (1) 08/01/2022     Review of Systems   Constitutional:  Negative for chills and fever. HENT:  Positive for sore throat. Negative for congestion. Respiratory:  Negative for shortness of breath and wheezing. Musculoskeletal:  Positive for back pain, joint pain, myalgias and neck pain. Negative for falls. Right thigh pain on walking  Better at rest  Can walk 20 minutes   Neurological:  Positive for headaches. Psychiatric/Behavioral:  The patient is not nervous/anxious.       has Palpitations, Shortness of breath, Other specified cardiac dysrhythmias(427.89), Cardiomyopathy ([address] Essential hypertension with goal blood pressure less than 140/90, Morbid obesity ([address] Seborrheic dermatitis, Vitamin D deficiency, Moderate persistent asthma without complication, Bilateral low back pain without sciatica, and Hyperlipidemia on their problem list.    Past Surgical History:   Procedure Laterality Date    HX ADENOIDECTOMY        reports that he has never smoked. He has never used smokeless tobacco. He reports that he does not drink alcohol and does not use drugs. family history includes Cancer in his maternal aunt; Diabetes in his mother; Heart Attack (age of onset: 48) in his father; Hypertension in his mother and sister; Stroke in his maternal grandfather and maternal grandmother. Visit Vitals  BP (!) 143/67 (BP 1 Location: Right arm, BP Patient Position: Sitting, BP Cuff Size: Large adult)   Pulse 64   Resp 16   Ht 5' 9\" (1.753 m)   Wt (!) 367 lb (166.5 kg)   SpO2 95%   BMI 54.20 kg/m²     Wt Readings from Last 3 Encounters:   01/09/23 (!) 367 lb (166.5 kg)   10/18/22 (!) 382 lb (173.3 kg)   10/11/22 (!) 391 lb (177.4 kg)         Physical Exam  Vitals and nursing note reviewed. Constitutional:       General: He is not in acute distress. Appearance: He is well-developed. HENT:      Right Ear: External ear normal.      Left Ear: External ear normal.      Nose: Nose normal.   Eyes:      Pupils: Pupils are equal, round, and reactive to light. Neck:      Thyroid: No thyromegaly. Comments: Carotid bruit absent  Cardiovascular:      Rate and Rhythm: Normal rate and regular rhythm. Heart sounds: No murmur heard. No friction rub. No gallop. Pulmonary:      Effort: Pulmonary effort is normal. No respiratory distress. Breath sounds: Normal breath sounds. Abdominal:      General: There is no distension. Palpations: There is no mass. Tenderness: There is no abdominal tenderness.    Musculoskeletal:         General: No tenderness (Right side of the neck aggravated by rotation of the neck to the right and right lateral extension). Cervical back: Neck supple. Lymphadenopathy:      Cervical: No cervical adenopathy. Skin:     General: Skin is warm and dry. Neurological:      Mental Status: He is alert and oriented to person, place, and time. Sensory: Sensory deficit (.Monofilament test on the right lateral thigh is diminished) present. Psychiatric:      Comments: Nice gentleman, likes to tell jokes      Visit Vitals  BP (!) 143/67 (BP 1 Location: Right arm, BP Patient Position: Sitting, BP Cuff Size: Large adult)   Pulse 64   Resp 16   Ht 5' 9\" (1.753 m)   Wt (!) 367 lb (166.5 kg)   SpO2 95%   BMI 54.20 kg/m²         Results for orders placed or performed during the hospital encounter of 12/13/22   CBC WITH AUTOMATED DIFF   Result Value Ref Range    WBC 10.3 4.6 - 13.2 K/uL    RBC 4.92 4.35 - 5.65 M/uL    HGB 14.5 13.0 - 16.0 g/dL    HCT 43.3 36.0 - 48.0 %    MCV 88.0 78.0 - 100.0 FL    MCH 29.5 24.0 - 34.0 PG    MCHC 33.5 31.0 - 37.0 g/dL    RDW 13.5 11.6 - 14.5 %    PLATELET 117 (H) 836 - 420 K/uL    MPV 11.1 9.2 - 11.8 FL    NRBC 0.0 0  WBC    ABSOLUTE NRBC 0.00 0.00 - 0.01 K/uL    NEUTROPHILS 60 40 - 73 %    LYMPHOCYTES 31 21 - 52 %    MONOCYTES 8 3 - 10 %    EOSINOPHILS 1 0 - 5 %    BASOPHILS 0 0 - 2 %    IMMATURE GRANULOCYTES 0 0.0 - 0.5 %    ABS. NEUTROPHILS 6.2 1.8 - 8.0 K/UL    ABS. LYMPHOCYTES 3.2 0.9 - 3.6 K/UL    ABS. MONOCYTES 0.8 0.05 - 1.2 K/UL    ABS. EOSINOPHILS 0.1 0.0 - 0.4 K/UL    ABS. BASOPHILS 0.0 0.0 - 0.1 K/UL    ABS. IMM.  GRANS. 0.0 0.00 - 0.04 K/UL    DF AUTOMATED     METABOLIC PANEL, BASIC   Result Value Ref Range    Sodium 140 136 - 145 mmol/L    Potassium 4.2 3.5 - 5.5 mmol/L    Chloride 107 100 - 111 mmol/L    CO2 25 21 - 32 mmol/L    Anion gap 8 3.0 - 18 mmol/L    Glucose 85 74 - 99 mg/dL    BUN 20 (H) 7.0 - 18 MG/DL    Creatinine 0.92 0.6 - 1.3 MG/DL    BUN/Creatinine ratio 22 (H) 12 - 20      eGFR >60 >60 ml/min/1.73m2    Calcium 9.1 8.5 - 10.1 MG/DL   LIPID PANEL   Result Value Ref Range    LIPID PROFILE          Cholesterol, total 174 <200 MG/DL    Triglyceride 94 <150 MG/DL    HDL Cholesterol 30 (L) 40 - 60 MG/DL    LDL, calculated 125.2 (H) 0 - 100 MG/DL    VLDL, calculated 18.8 MG/DL    CHOL/HDL Ratio 5.8 (H) 0 - 5.0           We discussed the expected course, resolution and complications of the diagnosis(es) in detail. Medication risks, benefits, costs, interactions, and alternatives were discussed as indicated. I advised him to contact the office if his condition worsens, changes or fails to improve as anticipated. He expressed understanding with the diagnosis(es) and plan. This note was done with the assistance of dragon speech software.   Some inadvertent errors or omissions may be present

## 2022-12-18 NOTE — PATIENT INSTRUCTIONS
Hyperlipidemia: After Your Visit  Your Care Instructions  Hyperlipidemia is too much fat in your blood. The body has several kinds of fat, including cholesterol and triglycerides. Your body needs fat for many things, such as making new cells. But too much fat in your blood increases your chances of having a heart attack or stroke. You may be able to lower your cholesterol and triglycerides with a heart-healthy diet, exercise, and if needed, medicine. Your doctor may want you to try lifestyle changes first to see whether they lower the fat in your blood. You may need to take medicine if lifestyle changes do not lower the fat in your blood enough. Follow-up care is a key part of your treatment and safety. Be sure to make and go to all appointments, and call your doctor if you are having problems. Its also a good idea to know your test results and keep a list of the medicines you take. How can you care for yourself at home? Take your medicines  Take your medicines exactly as prescribed. Call your doctor if you think you are having a problem with your medicine. If you take medicine to lower your cholesterol, go to follow-up visits. You will need to have blood tests. Do not take large doses of niacin, which is a B vitamin, while taking medicine called statins. It may increase the chance of muscle pain and liver problems. Talk to your doctor about avoiding grapefruit juice if you are taking statins. Grapefruit juice can raise the level of this medicine in your blood. This could increase side effects. Eat more fruits, vegetables, and fiber  Fruits and vegetables have lots of nutrients that help protect against heart disease, and they have little--if any--fat. Try to eat at least five servings a day. Dark green, deep orange, or yellow fruits and vegetables are healthy choices. Keep carrots, celery, and other veggies handy for snacks.  Buy fruit that is in season and store it where you can see it so that you will be tempted to eat it. Cook dishes that have a lot of veggies in them, such as stir-fries and soups. Foods high in fiber may reduce your cholesterol and provide important vitamins and minerals. High-fiber foods include whole-grain cereals and breads, oatmeal, beans, brown rice, citrus fruits, and apples. Buy whole-grain breads and cereals instead of white bread and pastries. Limit saturated fat  Read food labels and try to avoid saturated fat and trans fat. They increase your risk of heart disease. Use olive or canola oil when you cook. Try cholesterol-lowering spreads, such as Benecol or Take Control. Bake, broil, grill, or steam foods instead of frying them. Limit the amount of high-fat meats you eat, including hot dogs and sausages. Cut out all visible fat when you prepare meat. Eat fish, skinless poultry, and soy products such as tofu instead of high-fat meats. Soybeans may be especially good for your heart. Eat at least two servings of fish a week. Certain fish, such as salmon, contain omega-3 fatty acids, which may help reduce your risk of heart attack. Choose low-fat or fat-free milk and dairy products. Get exercise, limit alcohol, and quit smoking  Get more exercise. Work with your doctor to set up an exercise program. Even if you can do only a small amount, exercise will help you get stronger, have more energy, and manage your weight and your stress. Walking is an easy way to get exercise. Gradually increase the amount you walk every day. Aim for at least 30 minutes on most days of the week. You also may want to swim, bike, or do other activities. Limit alcohol to no more than 2 drinks a day for men and 1 drink a day for women. Do not smoke. If you need help quitting, talk to your doctor about stop-smoking programs and medicines. These can increase your chances of quitting for good. When should you call for help? Call 911 anytime you think you may need emergency care.  For example, call if:  You have symptoms of a heart attack. These may include:  Chest pain or pressure, or a strange feeling in the chest.  Sweating. Shortness of breath. Nausea or vomiting. Pain, pressure, or a strange feeling in the back, neck, jaw, or upper belly or in one or both shoulders or arms. Lightheadedness or sudden weakness. A fast or irregular heartbeat. After you call 911, the  may tell you to chew 1 adult-strength or 2 to 4 low-dose aspirin. Wait for an ambulance. Do not try to drive yourself. You have signs of a stroke. These may include:  Sudden numbness, paralysis, or weakness in your face, arm, or leg, especially on only one side of your body. New problems with walking or balance. Sudden vision changes. Drooling or slurred speech. New problems speaking or understanding simple statements, or feeling confused. A sudden, severe headache that is different from past headaches. You passed out (lost consciousness). Call your doctor now or seek immediate medical care if:  You have muscle pain or weakness. Watch closely for changes in your health, and be sure to contact your doctor if:  You are very tired. You have an upset stomach, gas, constipation, or belly pain or cramps. Where can you learn more? Go to [employer]  Enter C406 in the search box to learn more about \"Hyperlipidemia: After Your Visit. \"   © 8614-1738 Healthwise, Incorporated. Care instructions adapted under license by [facility] [employer] (which disclaims liability or warranty for this information). This care instruction is for use with your licensed healthcare professional. If you have questions about a medical condition or this instruction, always ask your healthcare professional. [place] [address] any warranty or liability for your use of this information.   Content Version: 5.7.396962; Last Revised: October 13, 2011                 High Blood Pressure: Care Instructions  Overview     It's normal for blood pressure to go up and down throughout the day. But if it stays up, you have high blood pressure. Another name for high blood pressure is hypertension. Despite what a lot of people think, high blood pressure usually doesn't cause headaches or make you feel dizzy or lightheaded. It usually has no symptoms. But it does increase your risk of stroke, heart attack, and other problems. You and your doctor will talk about your risks of these problems based on your blood pressure. Your doctor will give you a goal for your blood pressure. Your goal will be based on your health and your age. Lifestyle changes, such as eating healthy and being active, are always important to help lower blood pressure. You might also take medicine to reach your blood pressure goal.  Follow-up care is a key part of your treatment and safety. Be sure to make and go to all appointments, and call your doctor if you are having problems. It's also a good idea to know your test results and keep a list of the medicines you take. How can you care for yourself at home? Medical treatment  If you stop taking your medicine, your blood pressure will go back up. You may take one or more types of medicine to lower your blood pressure. Be safe with medicines. Take your medicine exactly as prescribed. Call your doctor if you think you are having a problem with your medicine. Talk to your doctor before you start taking aspirin every day. Aspirin can help certain people lower their risk of a heart attack or stroke. But taking aspirin isn't right for everyone, because it can cause serious bleeding. See your doctor regularly. You may need to see the doctor more often at first or until your blood pressure comes down. If you are taking blood pressure medicine, talk to your doctor before you take decongestants or anti-inflammatory medicine, such as ibuprofen. Some of these medicines can raise blood pressure.   Learn how to check your blood pressure at home.  Lifestyle changes  Stay at a healthy weight. This is especially important if you put on weight around the waist. Losing even 10 pounds can help you lower your blood pressure. If your doctor recommends it, get more exercise. Walking is a good choice. Bit by bit, increase the amount you walk every day. Try for at least 30 minutes on most days of the week. You also may want to swim, bike, or do other activities. Avoid or limit alcohol. Talk to your doctor about whether you can drink any alcohol. Try to limit how much sodium you eat to less than 2,300 milligrams (mg) a day. Your doctor may ask you to try to eat less than 1,500 mg a day. Eat plenty of fruits (such as bananas and oranges), vegetables, legumes, whole grains, and low-fat dairy products. Lower the amount of saturated fat in your diet. Saturated fat is found in animal products such as milk, cheese, and meat. Limiting these foods may help you lose weight and also lower your risk for heart disease. Do not smoke. Smoking increases your risk for heart attack and stroke. If you need help quitting, talk to your doctor about stop-smoking programs and medicines. These can increase your chances of quitting for good. When should you call for help? Call  911 anytime you think you may need emergency care. This may mean having symptoms that suggest that your blood pressure is causing a serious heart or blood vessel problem. Your blood pressure may be over 180/120. For example, call 911 if:    You have symptoms of a heart attack. These may include:  Chest pain or pressure, or a strange feeling in the chest.  Sweating. Shortness of breath. Nausea or vomiting. Pain, pressure, or a strange feeling in the back, neck, jaw, or upper belly or in one or both shoulders or arms. Lightheadedness or sudden weakness. A fast or irregular heartbeat. You have symptoms of a stroke.  These may include:  Sudden numbness, tingling, weakness, or loss of movement in your face, arm, or leg, especially on only one side of your body. Sudden vision changes. Sudden trouble speaking. Sudden confusion or trouble understanding simple statements. Sudden problems with walking or balance. A sudden, severe headache that is different from past headaches. You have severe back or belly pain. Do not wait until your blood pressure comes down on its own. Get help right away. Call your doctor now or seek immediate care if:    Your blood pressure is much higher than normal (such as 180/120 or higher), but you don't have symptoms. You think high blood pressure is causing symptoms, such as:  Severe headache. Blurry vision. Watch closely for changes in your health, and be sure to contact your doctor if:    Your blood pressure measures higher than your doctor recommends at least 2 times. That means the top number is higher or the bottom number is higher, or both. You think you may be having side effects from your blood pressure medicine. Where can you learn more? Go to http://www.gray.com/  Enter [id number] in the search box to learn more about \"High Blood Pressure: Care Instructions. \"  Current as of: August 31, 2020               Content Version: 12.8  © 2006-2021 [employer]. Care instructions adapted under license by [employer] (which disclaims liability or warranty for this information). If you have questions about a medical condition or this instruction, always ask your healthcare professional. [address] any warranty or liability for your use of this information. A Healthy Lifestyle: Care Instructions  A healthy lifestyle can help you feel good, have more energy, and stay at a weight that's healthy for you. You can share a healthy lifestyle with your friends and family. And you can do it on your own. Eat meals with your friends or family. You could try cooking together.   Plan activities with other people. Go for a walk with a friend, try a free online fitness class, or join a sports league. Eat a variety of healthy foods. These include fruits, vegetables, whole grains, low-fat dairy, and lean protein. Choose healthy portions of food. You can use the Nutrition Facts label on food packages as a guide. Eat more fruits and vegetables. You could add vegetables to sandwiches or add fruit to cereal.  Drink water when you are thirsty. Limit soda, juice, and sports drinks. Try to exercise most days. Aim for at least 2½ hours of exercise each week. Keep moving. Work in the garden or take your dog on a walk. Use the stairs instead of the elevator. If you use tobacco or nicotine, try to quit. Ask your doctor about programs and medicines to help you quit. Limit alcohol. Men should have no more than 2 drinks a day. Women should have no more than 1. For some people, no alcohol is the best choice. Follow-up care is a key part of your treatment and safety. Be sure to make and go to all appointments, and call your doctor if you are having problems. It's also a good idea to know your test results and keep a list of the medicines you take. Where can you learn more? Go to http://www.gray.com/  Enter T197 in the search box to learn more about \"A Healthy Lifestyle: Care Instructions. \"  Current as of: March 9, 2022               Content Version: 13.4  © 9135-9853 Healthwise, [employer]. Care instructions adapted under license by [employer] (which disclaims liability or warranty for this information). If you have questions about a medical condition or this instruction, always ask your healthcare professional. [address] any warranty or liability for your use of this information.

## 2022-12-29 DIAGNOSIS — I10 ESSENTIAL HYPERTENSION WITH GOAL BLOOD PRESSURE LESS THAN 140/90: ICD-10-CM

## 2022-12-29 DIAGNOSIS — E78.5 HYPERLIPIDEMIA, UNSPECIFIED HYPERLIPIDEMIA TYPE: ICD-10-CM

## 2022-12-29 RX ORDER — LISINOPRIL 2.5 MG/1
TABLET ORAL
Qty: 90 TABLET | Refills: 3 | Status: SHIPPED | OUTPATIENT
Start: 2022-12-29

## 2022-12-29 RX ORDER — GEMFIBROZIL 600 MG/1
TABLET, FILM COATED ORAL
Qty: 180 TABLET | Refills: 3 | Status: SHIPPED | OUTPATIENT
Start: 2022-12-29

## 2022-12-29 RX ORDER — CARVEDILOL 6.25 MG/1
TABLET ORAL
Qty: 180 TABLET | Refills: 3 | Status: SHIPPED | OUTPATIENT
Start: 2022-12-29

## 2023-01-09 ENCOUNTER — OFFICE VISIT (OUTPATIENT)
Dept: FAMILY MEDICINE CLINIC | Age: 28
End: 2023-01-09
Payer: MEDICAID

## 2023-01-09 VITALS
OXYGEN SATURATION: 95 % | HEART RATE: 64 BPM | BODY MASS INDEX: 46.65 KG/M2 | SYSTOLIC BLOOD PRESSURE: 128 MMHG | WEIGHT: 315 LBS | DIASTOLIC BLOOD PRESSURE: 70 MMHG | RESPIRATION RATE: 16 BRPM | HEIGHT: 69 IN

## 2023-01-09 DIAGNOSIS — E78.5 HYPERLIPIDEMIA, UNSPECIFIED HYPERLIPIDEMIA TYPE: ICD-10-CM

## 2023-01-09 DIAGNOSIS — E66.01 MORBID OBESITY (HCC): ICD-10-CM

## 2023-01-09 DIAGNOSIS — I10 ESSENTIAL HYPERTENSION WITH GOAL BLOOD PRESSURE LESS THAN 140/90: Primary | ICD-10-CM

## 2023-01-09 DIAGNOSIS — M79.2 NEUROPATHIC PAIN: ICD-10-CM

## 2023-01-09 PROCEDURE — 3074F SYST BP LT 130 MM HG: CPT | Performed by: EMERGENCY MEDICINE

## 2023-01-09 PROCEDURE — 99214 OFFICE O/P EST MOD 30 MIN: CPT | Performed by: EMERGENCY MEDICINE

## 2023-01-09 PROCEDURE — 3078F DIAST BP <80 MM HG: CPT | Performed by: EMERGENCY MEDICINE

## 2023-01-09 NOTE — PROGRESS NOTES
1. \"Have you been to the ER, urgent care clinic since your last visit? Hospitalized since your last visit? \" No    2. \"Have you seen or consulted any other health care providers outside of the [address] [place] [address] since your last visit? \" No     3. For patients aged 39-70: Has the patient had a colonoscopy / FIT/ Cologuard? NA - based on age      If the patient is female:    4. For patients aged 41-77: Has the patient had a mammogram within the past 2 years? NA - based on age or sex      11. For patients aged 21-65: Has the patient had a pap smear?  NA - based on age or sex

## 2023-02-08 DIAGNOSIS — M54.50 CHRONIC BILATERAL LOW BACK PAIN WITHOUT SCIATICA: ICD-10-CM

## 2023-02-08 DIAGNOSIS — G89.29 CHRONIC BILATERAL LOW BACK PAIN WITHOUT SCIATICA: ICD-10-CM

## 2023-02-08 RX ORDER — NAPROXEN 500 MG/1
TABLET ORAL
Qty: 60 TABLET | Refills: 11 | Status: SHIPPED | OUTPATIENT
Start: 2023-02-08

## 2023-05-08 ENCOUNTER — HOSPITAL ENCOUNTER (OUTPATIENT)
Facility: HOSPITAL | Age: 28
Setting detail: SPECIMEN
Discharge: HOME | End: 2023-05-11
Payer: MEDICAID

## 2023-05-08 ENCOUNTER — OFFICE VISIT (OUTPATIENT)
Facility: CLINIC | Age: 28
End: 2023-05-08
Payer: MEDICAID

## 2023-05-08 VITALS
OXYGEN SATURATION: 97 % | DIASTOLIC BLOOD PRESSURE: 46 MMHG | SYSTOLIC BLOOD PRESSURE: 115 MMHG | HEART RATE: 72 BPM | HEIGHT: 69 IN | RESPIRATION RATE: 16 BRPM | BODY MASS INDEX: 46.65 KG/M2 | WEIGHT: 315 LBS

## 2023-05-08 DIAGNOSIS — Z11.4 SCREENING FOR HIV (HUMAN IMMUNODEFICIENCY VIRUS): ICD-10-CM

## 2023-05-08 DIAGNOSIS — E78.5 HYPERLIPIDEMIA, UNSPECIFIED HYPERLIPIDEMIA TYPE: ICD-10-CM

## 2023-05-08 DIAGNOSIS — I10 ESSENTIAL (PRIMARY) HYPERTENSION: ICD-10-CM

## 2023-05-08 DIAGNOSIS — M79.2 NEURALGIA AND NEURITIS, UNSPECIFIED: ICD-10-CM

## 2023-05-08 DIAGNOSIS — I10 ESSENTIAL (PRIMARY) HYPERTENSION: Primary | ICD-10-CM

## 2023-05-08 DIAGNOSIS — J45.40 MODERATE PERSISTENT ASTHMA, UNCOMPLICATED: ICD-10-CM

## 2023-05-08 DIAGNOSIS — E66.01 MORBID (SEVERE) OBESITY DUE TO EXCESS CALORIES (HCC): ICD-10-CM

## 2023-05-08 LAB
ALBUMIN SERPL-MCNC: 3.9 G/DL (ref 3.4–5)
ALBUMIN/GLOB SERPL: 1.1 (ref 0.8–1.7)
ALP SERPL-CCNC: 101 U/L (ref 45–117)
ALT SERPL-CCNC: 32 U/L (ref 16–61)
ANION GAP SERPL CALC-SCNC: 6 MMOL/L (ref 3–18)
AST SERPL-CCNC: 24 U/L (ref 10–38)
BASOPHILS # BLD: 0 K/UL (ref 0–0.1)
BASOPHILS NFR BLD: 0 % (ref 0–2)
BILIRUB SERPL-MCNC: 0.3 MG/DL (ref 0.2–1)
BUN SERPL-MCNC: 20 MG/DL (ref 7–18)
BUN/CREAT SERPL: 23 (ref 12–20)
CALCIUM SERPL-MCNC: 9.6 MG/DL (ref 8.5–10.1)
CHLORIDE SERPL-SCNC: 110 MMOL/L (ref 100–111)
CHOLEST SERPL-MCNC: 169 MG/DL
CO2 SERPL-SCNC: 23 MMOL/L (ref 21–32)
CREAT SERPL-MCNC: 0.86 MG/DL (ref 0.6–1.3)
DIFFERENTIAL METHOD BLD: ABNORMAL
EOSINOPHIL # BLD: 0.4 K/UL (ref 0–0.4)
EOSINOPHIL NFR BLD: 4 % (ref 0–5)
ERYTHROCYTE [DISTWIDTH] IN BLOOD BY AUTOMATED COUNT: 14.1 % (ref 11.6–14.5)
GLOBULIN SER CALC-MCNC: 3.7 G/DL (ref 2–4)
GLUCOSE SERPL-MCNC: 101 MG/DL (ref 74–99)
HCT VFR BLD AUTO: 41.1 % (ref 36–48)
HDLC SERPL-MCNC: 34 MG/DL (ref 40–60)
HDLC SERPL: 5 (ref 0–5)
HGB BLD-MCNC: 13 G/DL (ref 13–16)
IMM GRANULOCYTES # BLD AUTO: 0.1 K/UL (ref 0–0.04)
IMM GRANULOCYTES NFR BLD AUTO: 1 % (ref 0–0.5)
LDLC SERPL CALC-MCNC: 114 MG/DL (ref 0–100)
LIPID PANEL: ABNORMAL
LYMPHOCYTES # BLD: 2.6 K/UL (ref 0.9–3.6)
LYMPHOCYTES NFR BLD: 28 % (ref 21–52)
MCH RBC QN AUTO: 28.3 PG (ref 24–34)
MCHC RBC AUTO-ENTMCNC: 31.6 G/DL (ref 31–37)
MCV RBC AUTO: 89.5 FL (ref 78–100)
MONOCYTES # BLD: 0.9 K/UL (ref 0.05–1.2)
MONOCYTES NFR BLD: 9 % (ref 3–10)
NEUTS SEG # BLD: 5.4 K/UL (ref 1.8–8)
NEUTS SEG NFR BLD: 58 % (ref 40–73)
NRBC # BLD: 0 K/UL (ref 0–0.01)
NRBC BLD-RTO: 0 PER 100 WBC
PLATELET # BLD AUTO: 364 K/UL (ref 135–420)
PMV BLD AUTO: 11.3 FL (ref 9.2–11.8)
POTASSIUM SERPL-SCNC: 4.4 MMOL/L (ref 3.5–5.5)
PROT SERPL-MCNC: 7.6 G/DL (ref 6.4–8.2)
RBC # BLD AUTO: 4.59 M/UL (ref 4.35–5.65)
SODIUM SERPL-SCNC: 139 MMOL/L (ref 136–145)
TRIGL SERPL-MCNC: 105 MG/DL
VLDLC SERPL CALC-MCNC: 21 MG/DL
WBC # BLD AUTO: 9.4 K/UL (ref 4.6–13.2)

## 2023-05-08 PROCEDURE — 3074F SYST BP LT 130 MM HG: CPT | Performed by: EMERGENCY MEDICINE

## 2023-05-08 PROCEDURE — 36415 COLL VENOUS BLD VENIPUNCTURE: CPT

## 2023-05-08 PROCEDURE — 3078F DIAST BP <80 MM HG: CPT | Performed by: EMERGENCY MEDICINE

## 2023-05-08 PROCEDURE — 87389 HIV-1 AG W/HIV-1&-2 AB AG IA: CPT

## 2023-05-08 PROCEDURE — 99214 OFFICE O/P EST MOD 30 MIN: CPT | Performed by: EMERGENCY MEDICINE

## 2023-05-08 PROCEDURE — 80053 COMPREHEN METABOLIC PANEL: CPT

## 2023-05-08 PROCEDURE — 85025 COMPLETE CBC W/AUTO DIFF WBC: CPT

## 2023-05-08 PROCEDURE — 80061 LIPID PANEL: CPT

## 2023-05-08 RX ORDER — PHENTERMINE HYDROCHLORIDE 37.5 MG/1
37.5 TABLET ORAL
Qty: 30 TABLET | Refills: 2 | Status: SHIPPED | OUTPATIENT
Start: 2023-05-08 | End: 2023-06-07

## 2023-05-08 RX ORDER — GABAPENTIN 100 MG/1
CAPSULE ORAL
Qty: 90 CAPSULE | Refills: 0 | Status: SHIPPED | OUTPATIENT
Start: 2023-05-08 | End: 2023-06-07

## 2023-05-08 RX ORDER — NAPROXEN 500 MG/1
500 TABLET ORAL 2 TIMES DAILY WITH MEALS
Qty: 60 TABLET | Refills: 3 | Status: SHIPPED | OUTPATIENT
Start: 2023-05-08

## 2023-05-08 SDOH — ECONOMIC STABILITY: INCOME INSECURITY: HOW HARD IS IT FOR YOU TO PAY FOR THE VERY BASICS LIKE FOOD, HOUSING, MEDICAL CARE, AND HEATING?: SOMEWHAT HARD

## 2023-05-08 SDOH — ECONOMIC STABILITY: FOOD INSECURITY: WITHIN THE PAST 12 MONTHS, THE FOOD YOU BOUGHT JUST DIDN'T LAST AND YOU DIDN'T HAVE MONEY TO GET MORE.: SOMETIMES TRUE

## 2023-05-08 SDOH — ECONOMIC STABILITY: HOUSING INSECURITY
IN THE LAST 12 MONTHS, WAS THERE A TIME WHEN YOU DID NOT HAVE A STEADY PLACE TO SLEEP OR SLEPT IN A SHELTER (INCLUDING NOW)?: NO

## 2023-05-08 SDOH — ECONOMIC STABILITY: FOOD INSECURITY: WITHIN THE PAST 12 MONTHS, YOU WORRIED THAT YOUR FOOD WOULD RUN OUT BEFORE YOU GOT MONEY TO BUY MORE.: SOMETIMES TRUE

## 2023-05-08 ASSESSMENT — ANXIETY QUESTIONNAIRES
5. BEING SO RESTLESS THAT IT IS HARD TO SIT STILL: 0
3. WORRYING TOO MUCH ABOUT DIFFERENT THINGS: 0
GAD7 TOTAL SCORE: 0
6. BECOMING EASILY ANNOYED OR IRRITABLE: 0
4. TROUBLE RELAXING: 0
7. FEELING AFRAID AS IF SOMETHING AWFUL MIGHT HAPPEN: 0
IF YOU CHECKED OFF ANY PROBLEMS ON THIS QUESTIONNAIRE, HOW DIFFICULT HAVE THESE PROBLEMS MADE IT FOR YOU TO DO YOUR WORK, TAKE CARE OF THINGS AT HOME, OR GET ALONG WITH OTHER PEOPLE: NOT DIFFICULT AT ALL
1. FEELING NERVOUS, ANXIOUS, OR ON EDGE: 0
2. NOT BEING ABLE TO STOP OR CONTROL WORRYING: 0

## 2023-05-08 ASSESSMENT — PATIENT HEALTH QUESTIONNAIRE - PHQ9
SUM OF ALL RESPONSES TO PHQ QUESTIONS 1-9: 0
SUM OF ALL RESPONSES TO PHQ QUESTIONS 1-9: 0
SUM OF ALL RESPONSES TO PHQ9 QUESTIONS 1 & 2: 0
SUM OF ALL RESPONSES TO PHQ QUESTIONS 1-9: 0
2. FEELING DOWN, DEPRESSED OR HOPELESS: 0
1. LITTLE INTEREST OR PLEASURE IN DOING THINGS: 0
SUM OF ALL RESPONSES TO PHQ QUESTIONS 1-9: 0

## 2023-05-10 LAB
HIV 1+2 AB+HIV1 P24 AG SERPL QL IA: NONREACTIVE
HIV 1/2 RESULT COMMENT: NORMAL

## 2023-06-24 DIAGNOSIS — M79.2 NEURALGIA AND NEURITIS, UNSPECIFIED: ICD-10-CM

## 2023-06-28 RX ORDER — NAPROXEN 500 MG/1
500 TABLET ORAL 2 TIMES DAILY WITH MEALS
Qty: 60 TABLET | Refills: 3 | Status: SHIPPED | OUTPATIENT
Start: 2023-06-28

## 2023-06-28 RX ORDER — GABAPENTIN 100 MG/1
CAPSULE ORAL
Qty: 90 CAPSULE | Refills: 0 | Status: SHIPPED | OUTPATIENT
Start: 2023-06-28 | End: 2023-07-24

## 2023-09-11 ENCOUNTER — OFFICE VISIT (OUTPATIENT)
Facility: CLINIC | Age: 28
End: 2023-09-11
Payer: MEDICAID

## 2023-09-11 VITALS
HEIGHT: 69 IN | HEART RATE: 70 BPM | OXYGEN SATURATION: 96 % | SYSTOLIC BLOOD PRESSURE: 113 MMHG | RESPIRATION RATE: 16 BRPM | WEIGHT: 315 LBS | DIASTOLIC BLOOD PRESSURE: 60 MMHG | BODY MASS INDEX: 46.65 KG/M2

## 2023-09-11 DIAGNOSIS — M79.2 NEURALGIA AND NEURITIS, UNSPECIFIED: ICD-10-CM

## 2023-09-11 DIAGNOSIS — Z23 ENCOUNTER FOR IMMUNIZATION: ICD-10-CM

## 2023-09-11 DIAGNOSIS — E66.01 MORBID (SEVERE) OBESITY DUE TO EXCESS CALORIES (HCC): ICD-10-CM

## 2023-09-11 DIAGNOSIS — E78.5 HYPERLIPIDEMIA, UNSPECIFIED HYPERLIPIDEMIA TYPE: ICD-10-CM

## 2023-09-11 DIAGNOSIS — I10 ESSENTIAL (PRIMARY) HYPERTENSION: Primary | ICD-10-CM

## 2023-09-11 DIAGNOSIS — J45.40 MODERATE PERSISTENT ASTHMA, UNCOMPLICATED: ICD-10-CM

## 2023-09-11 PROCEDURE — 90471 IMMUNIZATION ADMIN: CPT | Performed by: EMERGENCY MEDICINE

## 2023-09-11 PROCEDURE — 3074F SYST BP LT 130 MM HG: CPT | Performed by: EMERGENCY MEDICINE

## 2023-09-11 PROCEDURE — 3078F DIAST BP <80 MM HG: CPT | Performed by: EMERGENCY MEDICINE

## 2023-09-11 PROCEDURE — 90674 CCIIV4 VAC NO PRSV 0.5 ML IM: CPT | Performed by: EMERGENCY MEDICINE

## 2023-09-11 PROCEDURE — 99214 OFFICE O/P EST MOD 30 MIN: CPT | Performed by: EMERGENCY MEDICINE

## 2023-09-11 RX ORDER — ATORVASTATIN CALCIUM 10 MG/1
10 TABLET, FILM COATED ORAL DAILY
Qty: 90 TABLET | Refills: 3 | Status: SHIPPED | OUTPATIENT
Start: 2023-09-11

## 2023-09-11 RX ORDER — PHENTERMINE HYDROCHLORIDE 37.5 MG/1
37.5 CAPSULE ORAL EVERY MORNING
Qty: 30 CAPSULE | Refills: 0 | Status: CANCELLED | OUTPATIENT
Start: 2023-09-11 | End: 2023-10-11

## 2023-09-11 SDOH — ECONOMIC STABILITY: INCOME INSECURITY: HOW HARD IS IT FOR YOU TO PAY FOR THE VERY BASICS LIKE FOOD, HOUSING, MEDICAL CARE, AND HEATING?: SOMEWHAT HARD

## 2023-09-11 SDOH — ECONOMIC STABILITY: FOOD INSECURITY: WITHIN THE PAST 12 MONTHS, YOU WORRIED THAT YOUR FOOD WOULD RUN OUT BEFORE YOU GOT MONEY TO BUY MORE.: NEVER TRUE

## 2023-09-11 SDOH — ECONOMIC STABILITY: FOOD INSECURITY: WITHIN THE PAST 12 MONTHS, THE FOOD YOU BOUGHT JUST DIDN'T LAST AND YOU DIDN'T HAVE MONEY TO GET MORE.: NEVER TRUE

## 2023-09-11 ASSESSMENT — PATIENT HEALTH QUESTIONNAIRE - PHQ9
SUM OF ALL RESPONSES TO PHQ QUESTIONS 1-9: 0
SUM OF ALL RESPONSES TO PHQ QUESTIONS 1-9: 0
1. LITTLE INTEREST OR PLEASURE IN DOING THINGS: 0
SUM OF ALL RESPONSES TO PHQ QUESTIONS 1-9: 0
SUM OF ALL RESPONSES TO PHQ QUESTIONS 1-9: 0
SUM OF ALL RESPONSES TO PHQ9 QUESTIONS 1 & 2: 0
2. FEELING DOWN, DEPRESSED OR HOPELESS: 0

## 2023-09-11 ASSESSMENT — ANXIETY QUESTIONNAIRES
4. TROUBLE RELAXING: 0
3. WORRYING TOO MUCH ABOUT DIFFERENT THINGS: 0
2. NOT BEING ABLE TO STOP OR CONTROL WORRYING: 0
7. FEELING AFRAID AS IF SOMETHING AWFUL MIGHT HAPPEN: 0
1. FEELING NERVOUS, ANXIOUS, OR ON EDGE: 0
5. BEING SO RESTLESS THAT IT IS HARD TO SIT STILL: 0
GAD7 TOTAL SCORE: 0
IF YOU CHECKED OFF ANY PROBLEMS ON THIS QUESTIONNAIRE, HOW DIFFICULT HAVE THESE PROBLEMS MADE IT FOR YOU TO DO YOUR WORK, TAKE CARE OF THINGS AT HOME, OR GET ALONG WITH OTHER PEOPLE: NOT DIFFICULT AT ALL
6. BECOMING EASILY ANNOYED OR IRRITABLE: 0

## 2023-09-11 NOTE — PATIENT INSTRUCTIONS
weight-related health problems, such as high blood pressure, heart disease, stroke, arthritis or joint pain, and diabetes. If your BMI is in the underweight range, you may be at increased risk for health problems such as fatigue, lower protection (immunity) against illness, muscle loss, bone loss, hair loss, and hormone problems. BMI is just one measure of your risk for weight-related health problems. You may be at higher risk for health problems if you are not active, you eat an unhealthy diet, or you drink too much alcohol or use tobacco products. Follow-up care is a key part of your treatment and safety. Be sure to make and go to all appointments, and call your doctor if you are having problems. It's also a good idea to know your test results and keep a list of the medicines you take. How can you care for yourself at home? Practice healthy eating habits. This includes eating plenty of fruits, vegetables, whole grains, lean protein, and low-fat dairy. If your doctor recommends it, get more exercise. Walking is a good choice. Bit by bit, increase the amount you walk every day. Try for at least 30 minutes on most days of the week. Do not smoke. Smoking can increase your risk for health problems. If you need help quitting, talk to your doctor about stop-smoking programs and medicines. These can increase your chances of quitting for good. Limit alcohol to 2 drinks a day for men and 1 drink a day for women. Too much alcohol can cause health problems. If you have a BMI higher than 25  Your doctor may do other tests to check your risk for weight-related health problems. This may include measuring the distance around your waist. A waist measurement of more than 40 inches in men or 35 inches in women can increase the risk of weight-related health problems. Talk with your doctor about steps you can take to stay healthy or improve your health.  You may need to make lifestyle changes to lose weight and stay healthy,

## 2023-09-11 NOTE — PROGRESS NOTES
Assessment/Plan  Visit Diagnoses and Associated Orders       Essential (primary) hypertension    -  Primary         Hyperlipidemia, unspecified hyperlipidemia type             Morbid (severe) obesity due to excess calories ([address])             Neuralgia and neuritis, unspecified             Moderate persistent asthma, uncomplicated                   RESULTS REVIEW: 5/8/2023: BMP shows BUN 20 otherwise negative. Random glucose 101  Cholesterol 169 LDL cholesterol 114 triglycerides 105  GI liver profile unremarkable  CBC unremarkable  F/ULABS/DIAGNOSTICS before next visit  I would like the patient to follow-up in my office in  4 months       Lab results, diagnostic and radiologic results and schedule of future  studies reviewed with  the patient  All questions were answered and understood.   Health Maintenance Due   Topic Date Due    Hepatitis B vaccine (1 of 3 - 3-dose series) Never done    COVID-19 Vaccine (1) Never done    Varicella vaccine (1 of 2 - 2-dose childhood series) Never done    Flu vaccine (1) 08/01/2023     Current Outpatient Medications   Medication Sig    gabapentin (NEURONTIN) 100 MG capsule Take 2 at night  Indications: neuropathic pain    naproxen (NAPROSYN) 500 MG tablet Take 1 tablet by mouth 2 times daily (with meals)    albuterol sulfate HFA (PROVENTIL;VENTOLIN;PROAIR) 108 (90 Base) MCG/ACT inhaler Inhale 2 puffs into the lungs every 6 hours as needed    carvedilol (COREG) 6.25 MG tablet Take 1 tablet by mouth 2 times daily    fluticasone (FLONASE) 50 MCG/ACT nasal spray One squirt each nostril BID    gemfibrozil (LOPID) 600 MG tablet Take 1 tablet by mouth 2 times daily    ketoconazole (NIZORAL) 2 % shampoo Apply as shampoo 3 times per week.    lidocaine viscous hcl (XYLOCAINE) 2 % SOLN solution Put 10 mL in mouth and swish and spit out QAC and at bedtime    lisinopril (PRINIVIL;ZESTRIL) 2.5 MG tablet Take 1 tablet by mouth daily    tiotropium (SPIRIVA RESPIMAT) 1.25 MCG/ACT AERS inhaler

## 2023-12-14 DIAGNOSIS — M79.2 NEURALGIA AND NEURITIS, UNSPECIFIED: ICD-10-CM

## 2023-12-14 DIAGNOSIS — E78.5 HYPERLIPIDEMIA, UNSPECIFIED HYPERLIPIDEMIA TYPE: ICD-10-CM

## 2023-12-16 RX ORDER — ATORVASTATIN CALCIUM 10 MG/1
10 TABLET, FILM COATED ORAL DAILY
Qty: 90 TABLET | Refills: 3 | Status: SHIPPED | OUTPATIENT
Start: 2023-12-16

## 2023-12-16 RX ORDER — NAPROXEN 500 MG/1
500 TABLET ORAL 2 TIMES DAILY WITH MEALS
Qty: 60 TABLET | Refills: 3 | Status: SHIPPED | OUTPATIENT
Start: 2023-12-16

## 2023-12-16 RX ORDER — GABAPENTIN 100 MG/1
CAPSULE ORAL
Qty: 90 CAPSULE | Refills: 0 | Status: SHIPPED | OUTPATIENT
Start: 2023-12-16 | End: 2024-02-27

## 2024-01-08 RX ORDER — LISINOPRIL 2.5 MG/1
2.5 TABLET ORAL DAILY
Qty: 90 TABLET | Refills: 3 | Status: SHIPPED | OUTPATIENT
Start: 2024-01-08

## 2024-01-08 RX ORDER — GEMFIBROZIL 600 MG/1
600 TABLET, FILM COATED ORAL 2 TIMES DAILY
Qty: 180 TABLET | Refills: 3 | Status: SHIPPED | OUTPATIENT
Start: 2024-01-08

## 2024-01-08 RX ORDER — CARVEDILOL 6.25 MG/1
TABLET ORAL
Qty: 180 TABLET | Refills: 3 | Status: SHIPPED | OUTPATIENT
Start: 2024-01-08

## 2024-02-05 DIAGNOSIS — M79.2 NEURALGIA AND NEURITIS, UNSPECIFIED: ICD-10-CM

## 2024-02-06 RX ORDER — GABAPENTIN 100 MG/1
CAPSULE ORAL
Qty: 90 CAPSULE | Refills: 0 | Status: SHIPPED | OUTPATIENT
Start: 2024-02-06 | End: 2024-04-18

## 2024-02-06 RX ORDER — NAPROXEN 500 MG/1
500 TABLET ORAL 2 TIMES DAILY WITH MEALS
Qty: 60 TABLET | Refills: 3 | Status: SHIPPED | OUTPATIENT
Start: 2024-02-06

## 2024-04-18 DIAGNOSIS — M79.2 NEURALGIA AND NEURITIS, UNSPECIFIED: ICD-10-CM

## 2024-05-01 RX ORDER — GABAPENTIN 100 MG/1
CAPSULE ORAL
Qty: 90 CAPSULE | Refills: 0 | Status: SHIPPED | OUTPATIENT
Start: 2024-05-01 | End: 2024-06-29

## 2024-05-01 RX ORDER — NAPROXEN 500 MG/1
500 TABLET ORAL 2 TIMES DAILY WITH MEALS
Qty: 60 TABLET | Refills: 3 | Status: SHIPPED | OUTPATIENT
Start: 2024-05-01

## 2024-09-13 DIAGNOSIS — M79.2 NEURALGIA AND NEURITIS, UNSPECIFIED: ICD-10-CM

## 2024-09-16 DIAGNOSIS — M79.2 NEURALGIA AND NEURITIS, UNSPECIFIED: ICD-10-CM

## 2024-09-17 RX ORDER — GABAPENTIN 100 MG/1
CAPSULE ORAL
Qty: 30 CAPSULE | Refills: 0 | OUTPATIENT
Start: 2024-09-17

## 2024-09-17 RX ORDER — GABAPENTIN 100 MG/1
CAPSULE ORAL
Qty: 90 CAPSULE | Refills: 0 | Status: SHIPPED | OUTPATIENT
Start: 2024-09-17 | End: 2024-11-11

## 2024-12-02 DIAGNOSIS — E78.5 HYPERLIPIDEMIA, UNSPECIFIED HYPERLIPIDEMIA TYPE: ICD-10-CM

## 2024-12-03 RX ORDER — ATORVASTATIN CALCIUM 10 MG/1
10 TABLET, FILM COATED ORAL DAILY
Qty: 90 TABLET | Refills: 3 | Status: SHIPPED | OUTPATIENT
Start: 2024-12-03

## 2024-12-17 DIAGNOSIS — E78.5 HYPERLIPIDEMIA, UNSPECIFIED HYPERLIPIDEMIA TYPE: ICD-10-CM

## 2024-12-17 DIAGNOSIS — M79.2 NEURALGIA AND NEURITIS, UNSPECIFIED: ICD-10-CM

## 2024-12-18 RX ORDER — NAPROXEN 500 MG/1
500 TABLET ORAL 2 TIMES DAILY WITH MEALS
Qty: 60 TABLET | Refills: 3 | Status: SHIPPED | OUTPATIENT
Start: 2024-12-18

## 2024-12-18 RX ORDER — LISINOPRIL 2.5 MG/1
2.5 TABLET ORAL DAILY
Qty: 90 TABLET | Refills: 3 | Status: SHIPPED | OUTPATIENT
Start: 2024-12-18

## 2024-12-18 RX ORDER — CARVEDILOL 6.25 MG/1
6.25 TABLET ORAL 2 TIMES DAILY
Qty: 180 TABLET | Refills: 3 | Status: SHIPPED | OUTPATIENT
Start: 2024-12-18

## 2024-12-18 RX ORDER — GABAPENTIN 100 MG/1
CAPSULE ORAL
Qty: 90 CAPSULE | Refills: 0 | Status: SHIPPED | OUTPATIENT
Start: 2024-12-18 | End: 2025-02-10

## 2024-12-18 RX ORDER — ATORVASTATIN CALCIUM 10 MG/1
10 TABLET, FILM COATED ORAL DAILY
Qty: 90 TABLET | Refills: 3 | Status: SHIPPED | OUTPATIENT
Start: 2024-12-18

## 2025-01-05 DIAGNOSIS — M79.2 NEURALGIA AND NEURITIS, UNSPECIFIED: ICD-10-CM

## 2025-01-07 RX ORDER — GEMFIBROZIL 600 MG/1
600 TABLET, FILM COATED ORAL 2 TIMES DAILY
Qty: 180 TABLET | Refills: 3 | Status: SHIPPED | OUTPATIENT
Start: 2025-01-07

## 2025-01-07 RX ORDER — NAPROXEN 500 MG/1
500 TABLET ORAL 2 TIMES DAILY WITH MEALS
Qty: 60 TABLET | Refills: 3 | Status: SHIPPED | OUTPATIENT
Start: 2025-01-07

## 2025-02-27 NOTE — PROGRESS NOTES
Lea Juarez  1995   Chief Complaint   Patient presents with    Knee Pain     right, slip and fell        HISTORY OF PRESENT ILLNESS  Lea Juarez is a 31 y.o. male who presents today for evaluation of right knee pain. He rates the pain 9/10 today. He was in his garage on Monday when he fell onto his knee. He has had pain since then which brings him in today. His pain is located on the anterior aspect and lateral aspect of his knee. He did notice some swelling when the injury happened but that has improved. The pain is worse with bending and walking. The pain has improved since the injury but not resolved completely. He applied ice right after the injury but hasn't taken anything or use ice since then. He has not had any cortisone injections for this knee. Patient denies any fever, chills, chest pain, shortness of breath or calf pain. There are no new illness or injuries to report since last seen in the office. No changes in medications, allergies, social or family history. PHYSICAL EXAM:   Visit Vitals  Temp 98 °F (36.7 °C) (Skin)   Ht 5' 9\" (1.753 m)   Wt (!) 381 lb (172.8 kg)   BMI 56.26 kg/m²     The patient is a well-developed, well-nourished male   in no acute distress. The patient is alert and oriented times three. Mood and affect are normal.  LYMPHATIC: lymph nodes are not enlarged and are within normal limits  SKIN: normal in color and non tender to palpation. There are no bruises or abrasions noted. NEUROLOGICAL: Motor sensory exam is within normal limits. Reflexes are equal bilaterally.  There is normal sensation to pinprick and light touch  MUSCULOSKELETAL:  Examination Right knee   Skin Intact   Range of motion 0-130   Effusion -   Medial joint line tenderness +   Lateral joint line tenderness +   Tenderness Pes Bursa -   Tenderness insertion MCL -   Tenderness insertion LCL -   Mohamuds -   Patella crepitus -   Patella grind -   Lachman Not assessed   Pivot shift Not assessed   Anterior drawer Not assessed   Posterior drawer Not assessed   Varus stress -   Valgus stress -   Neurovascular Intact   Calf Swelling and Tenderness to Palpation -   J Carlos's Test -   Hamstring Cord Tightness -       IMAGIN views of the right knee 21 do not show any acute bony abnormalities. Mild OA of the medial compartment of the knee. IMPRESSION:      ICD-10-CM ICD-9-CM    1. Acute pain of right knee  M25.561 719.46 AMB POC XRAY, KNEE; COMPLETE, 4+ VIEW   2. Contusion of right knee, initial encounter  S80. 01XA 924.11    3. Soft tissue injury of right knee, initial encounter  S89. 91XA 891.0         PLAN:   Pt having right knee pain  I think his pain is coming from soft tissue injury or contusion after the fall. I have recommend conservative treatment for this. This should get better with time. Will prescribe volteran gel and diclofenac to help with pain. He will continue to ice and elevate to help with pain and swelling. If his pain does not improve will consider a cortisone injection.   RTC 6 weeks       [facility] The patient is a 41y Female complaining of vomiting.

## 2025-03-27 DIAGNOSIS — M79.2 NEURALGIA AND NEURITIS, UNSPECIFIED: ICD-10-CM

## 2025-03-27 RX ORDER — GABAPENTIN 100 MG/1
CAPSULE ORAL
Qty: 90 CAPSULE | Refills: 0 | Status: SHIPPED | OUTPATIENT
Start: 2025-03-27 | End: 2025-05-20

## 2025-04-18 NOTE — ASSESSMENT & PLAN NOTE
Chronic problem prior LDL elevation.  Several risk factors.  Recheck this visit and adjust treatment as indicated.  Lab Results   Component Value Date    CHOL 169 05/08/2023    TRIG 105 05/08/2023    HDL 34 (L) 05/08/2023     (H) 05/08/2023    VLDL 21 05/08/2023    CHOLHDLRATIO 5.0 05/08/2023       Orders:    Lipid Panel; Future

## 2025-04-18 NOTE — PATIENT INSTRUCTIONS
doctor if you are having problems. It's also a good idea to know your test results and keep a list of the medicines you take.  How can you care for yourself at home?  Practice healthy eating habits. This includes eating plenty of fruits, vegetables, whole grains, lean protein, and low-fat dairy.  If your doctor recommends it, get more exercise. Walking is a good choice. Bit by bit, increase the amount you walk every day. Try for at least 30 minutes on most days of the week.  Do not smoke. Smoking can increase your risk for health problems. If you need help quitting, talk to your doctor about stop-smoking programs and medicines. These can increase your chances of quitting for good.  Limit alcohol to 2 drinks a day for men and 1 drink a day for women. Too much alcohol can cause health problems.  If you have a BMI higher than 25  Your doctor may do other tests to check your risk for weight-related health problems. This may include measuring the distance around your waist. A waist measurement of more than 40 inches in men or 35 inches in women can increase the risk of weight-related health problems.  Talk with your doctor about steps you can take to stay healthy or improve your health. You may need to make lifestyle changes to lose weight and stay healthy, such as changing your diet and getting regular exercise.  If you have a BMI lower than 18.5  Your doctor may do other tests to check your risk for health problems.  Talk with your doctor about steps you can take to stay healthy or improve your health. You may need to make lifestyle changes to gain or maintain weight and stay healthy, such as getting more healthy foods in your diet and doing exercises to build muscle.  Where can you learn more?  Go to https://www.[employer].net/patientEd and enter S176 to learn more about \"Body Mass Index: Care Instructions.\"  Current as of: August 25, 2022               Content Version: 13.5  © 2742-6796 Healthwise, Incorporated.

## 2025-04-18 NOTE — PROGRESS NOTES
Assessment & Plan  Essential (primary) hypertension   Chronic, at goal (stable), continue current treatment plan  BP Readings from Last 3 Encounters:   04/28/25 122/68   09/11/23 113/60   05/08/23 (!) 115/46       Orders:    Comprehensive Metabolic Panel; Future    CBC with Auto Differential; Future    Hyperlipidemia, unspecified hyperlipidemia type     Chronic problem prior LDL elevation.  Several risk factors.  Recheck this visit and adjust treatment as indicated.  Lab Results   Component Value Date    CHOL 169 05/08/2023    TRIG 105 05/08/2023    HDL 34 (L) 05/08/2023     (H) 05/08/2023    VLDL 21 05/08/2023    CHOLHDLRATIO 5.0 05/08/2023       Orders:    Lipid Panel; Future    Morbid (severe) obesity due to excess calories   Chronic, not at goal (unstable), changes made today: We went over diet and exercise.  Download my fitness pal.  Walk 150 minutes a week  Wt Readings from Last 3 Encounters:   04/28/25 (!) 163.7 kg (361 lb)   09/11/23 (!) 163.3 kg (360 lb)   05/08/23 (!) 170.1 kg (375 lb)            Moderate persistent asthma, uncomplicated   Chronic, at goal (stable), continue current treatment plan         Neuralgia and neuritis, unspecified   Chronic, at goal (stable), continue current treatment plan         .    F/ULABS/DIAGNOSTICS before next visit  I would like the patient to follow-up in my office in  4 months    Lab results, diagnostic and radiologic results and schedule of future  studies reviewed with  the patient  All questions were answered and understood.  Health Maintenance Due   Topic Date Due    Varicella vaccine (1 of 2 - 13+ 2-dose series) Never done    Hepatitis B vaccine (1 of 3 - 19+ 3-dose series) Never done    Pneumococcal 0-49 years Vaccine (2 of 2 - PCV) 11/04/2023    Lipids  05/08/2024    COVID-19 Vaccine (1 - 2024-25 season) Never done    Depression Screen  09/11/2024   No colonoscopy on file   No cologuard on file  No FIT/FOBT on file   Last eye exam.Due.  Last dental

## 2025-04-28 ENCOUNTER — OFFICE VISIT (OUTPATIENT)
Facility: CLINIC | Age: 30
End: 2025-04-28
Payer: MEDICAID

## 2025-04-28 ENCOUNTER — HOSPITAL ENCOUNTER (OUTPATIENT)
Facility: HOSPITAL | Age: 30
Setting detail: SPECIMEN
Discharge: HOME | End: 2025-05-01
Payer: MEDICAID

## 2025-04-28 VITALS
HEIGHT: 69 IN | HEART RATE: 70 BPM | OXYGEN SATURATION: 96 % | DIASTOLIC BLOOD PRESSURE: 68 MMHG | RESPIRATION RATE: 16 BRPM | SYSTOLIC BLOOD PRESSURE: 122 MMHG | WEIGHT: 315 LBS | BODY MASS INDEX: 46.65 KG/M2 | TEMPERATURE: 98.4 F

## 2025-04-28 DIAGNOSIS — E78.5 HYPERLIPIDEMIA, UNSPECIFIED HYPERLIPIDEMIA TYPE: ICD-10-CM

## 2025-04-28 DIAGNOSIS — M79.2 NEURALGIA AND NEURITIS, UNSPECIFIED: ICD-10-CM

## 2025-04-28 DIAGNOSIS — J45.40 MODERATE PERSISTENT ASTHMA, UNCOMPLICATED: ICD-10-CM

## 2025-04-28 DIAGNOSIS — L21.9 SEBORRHEIC DERMATITIS: ICD-10-CM

## 2025-04-28 DIAGNOSIS — I10 ESSENTIAL (PRIMARY) HYPERTENSION: Primary | ICD-10-CM

## 2025-04-28 DIAGNOSIS — I10 ESSENTIAL (PRIMARY) HYPERTENSION: ICD-10-CM

## 2025-04-28 DIAGNOSIS — E66.01 MORBID (SEVERE) OBESITY DUE TO EXCESS CALORIES (HCC): ICD-10-CM

## 2025-04-28 LAB
ALBUMIN SERPL-MCNC: 3.8 G/DL (ref 3.4–5)
ALBUMIN/GLOB SERPL: 1.1 (ref 0.8–1.7)
ALP SERPL-CCNC: 84 U/L (ref 45–117)
ALT SERPL-CCNC: 21 U/L (ref 16–61)
ANION GAP SERPL CALC-SCNC: 5 MMOL/L (ref 3–18)
AST SERPL-CCNC: 15 U/L (ref 10–38)
BASOPHILS # BLD: 0.03 K/UL (ref 0–0.1)
BASOPHILS NFR BLD: 0.3 % (ref 0–2)
BILIRUB SERPL-MCNC: 0.4 MG/DL (ref 0.2–1)
BUN SERPL-MCNC: 24 MG/DL (ref 7–18)
BUN/CREAT SERPL: 27 (ref 12–20)
CALCIUM SERPL-MCNC: 9.6 MG/DL (ref 8.5–10.1)
CHLORIDE SERPL-SCNC: 106 MMOL/L (ref 100–111)
CHOLEST SERPL-MCNC: 117 MG/DL
CO2 SERPL-SCNC: 26 MMOL/L (ref 21–32)
CREAT SERPL-MCNC: 0.9 MG/DL (ref 0.6–1.3)
DIFFERENTIAL METHOD BLD: ABNORMAL
EOSINOPHIL # BLD: 0.16 K/UL (ref 0–0.4)
EOSINOPHIL NFR BLD: 1.6 % (ref 0–5)
ERYTHROCYTE [DISTWIDTH] IN BLOOD BY AUTOMATED COUNT: 13 % (ref 11.6–14.5)
GLOBULIN SER CALC-MCNC: 3.6 G/DL (ref 2–4)
GLUCOSE SERPL-MCNC: 88 MG/DL (ref 74–99)
HCT VFR BLD AUTO: 45.1 % (ref 36–48)
HDLC SERPL-MCNC: 33 MG/DL (ref 40–60)
HDLC SERPL: 3.5 (ref 0–5)
HGB BLD-MCNC: 14.6 G/DL (ref 13–16)
IMM GRANULOCYTES # BLD AUTO: 0.04 K/UL (ref 0–0.04)
IMM GRANULOCYTES NFR BLD AUTO: 0.4 % (ref 0–0.5)
LDLC SERPL CALC-MCNC: 72.8 MG/DL (ref 0–100)
LIPID PANEL: ABNORMAL
LYMPHOCYTES # BLD: 3.33 K/UL (ref 0.9–3.6)
LYMPHOCYTES NFR BLD: 34.2 % (ref 21–52)
MCH RBC QN AUTO: 29.1 PG (ref 24–34)
MCHC RBC AUTO-ENTMCNC: 32.4 G/DL (ref 31–37)
MCV RBC AUTO: 90 FL (ref 78–100)
MONOCYTES # BLD: 1.06 K/UL (ref 0.05–1.2)
MONOCYTES NFR BLD: 10.9 % (ref 3–10)
NEUTS SEG # BLD: 5.12 K/UL (ref 1.8–8)
NEUTS SEG NFR BLD: 52.6 % (ref 40–73)
NRBC # BLD: 0 K/UL (ref 0–0.01)
NRBC BLD-RTO: 0 PER 100 WBC
PLATELET # BLD AUTO: 367 K/UL (ref 135–420)
PMV BLD AUTO: 11.5 FL (ref 9.2–11.8)
POTASSIUM SERPL-SCNC: 4.5 MMOL/L (ref 3.5–5.5)
PROT SERPL-MCNC: 7.4 G/DL (ref 6.4–8.2)
RBC # BLD AUTO: 5.01 M/UL (ref 4.35–5.65)
SODIUM SERPL-SCNC: 137 MMOL/L (ref 136–145)
TRIGL SERPL-MCNC: 56 MG/DL
VLDLC SERPL CALC-MCNC: 11.2 MG/DL
WBC # BLD AUTO: 9.7 K/UL (ref 4.6–13.2)

## 2025-04-28 PROCEDURE — 3074F SYST BP LT 130 MM HG: CPT | Performed by: EMERGENCY MEDICINE

## 2025-04-28 PROCEDURE — 80061 LIPID PANEL: CPT

## 2025-04-28 PROCEDURE — 80053 COMPREHEN METABOLIC PANEL: CPT

## 2025-04-28 PROCEDURE — 99214 OFFICE O/P EST MOD 30 MIN: CPT | Performed by: EMERGENCY MEDICINE

## 2025-04-28 PROCEDURE — 85025 COMPLETE CBC W/AUTO DIFF WBC: CPT

## 2025-04-28 PROCEDURE — 3078F DIAST BP <80 MM HG: CPT | Performed by: EMERGENCY MEDICINE

## 2025-04-28 PROCEDURE — 36415 COLL VENOUS BLD VENIPUNCTURE: CPT

## 2025-04-28 RX ORDER — KETOCONAZOLE 20 MG/ML
SHAMPOO, SUSPENSION TOPICAL
Qty: 120 ML | Refills: 3 | Status: SHIPPED | OUTPATIENT
Start: 2025-04-28

## 2025-04-28 SDOH — ECONOMIC STABILITY: FOOD INSECURITY: WITHIN THE PAST 12 MONTHS, THE FOOD YOU BOUGHT JUST DIDN'T LAST AND YOU DIDN'T HAVE MONEY TO GET MORE.: NEVER TRUE

## 2025-04-28 SDOH — ECONOMIC STABILITY: FOOD INSECURITY: WITHIN THE PAST 12 MONTHS, YOU WORRIED THAT YOUR FOOD WOULD RUN OUT BEFORE YOU GOT MONEY TO BUY MORE.: NEVER TRUE

## 2025-04-28 ASSESSMENT — PATIENT HEALTH QUESTIONNAIRE - PHQ9
SUM OF ALL RESPONSES TO PHQ QUESTIONS 1-9: 0
SUM OF ALL RESPONSES TO PHQ QUESTIONS 1-9: 0
1. LITTLE INTEREST OR PLEASURE IN DOING THINGS: NOT AT ALL
SUM OF ALL RESPONSES TO PHQ QUESTIONS 1-9: 0
SUM OF ALL RESPONSES TO PHQ QUESTIONS 1-9: 0
2. FEELING DOWN, DEPRESSED OR HOPELESS: NOT AT ALL

## 2025-04-28 ASSESSMENT — ANXIETY QUESTIONNAIRES
6. BECOMING EASILY ANNOYED OR IRRITABLE: NOT AT ALL
1. FEELING NERVOUS, ANXIOUS, OR ON EDGE: NOT AT ALL
7. FEELING AFRAID AS IF SOMETHING AWFUL MIGHT HAPPEN: NOT AT ALL
IF YOU CHECKED OFF ANY PROBLEMS ON THIS QUESTIONNAIRE, HOW DIFFICULT HAVE THESE PROBLEMS MADE IT FOR YOU TO DO YOUR WORK, TAKE CARE OF THINGS AT HOME, OR GET ALONG WITH OTHER PEOPLE: NOT DIFFICULT AT ALL
GAD7 TOTAL SCORE: 0
2. NOT BEING ABLE TO STOP OR CONTROL WORRYING: NOT AT ALL
3. WORRYING TOO MUCH ABOUT DIFFERENT THINGS: NOT AT ALL
4. TROUBLE RELAXING: NOT AT ALL
5. BEING SO RESTLESS THAT IT IS HARD TO SIT STILL: NOT AT ALL

## 2025-04-30 ENCOUNTER — RESULTS FOLLOW-UP (OUTPATIENT)
Facility: CLINIC | Age: 30
End: 2025-04-30

## 2025-04-30 NOTE — RESULT ENCOUNTER NOTE
Please call.  His chemistries look good unremarkable, cholesterol is 117 well below 200, complete blood count is normal.  No change in treatment.  Will see him next visit

## 2025-05-19 DIAGNOSIS — M79.2 NEURALGIA AND NEURITIS, UNSPECIFIED: ICD-10-CM

## 2025-05-19 RX ORDER — GABAPENTIN 100 MG/1
CAPSULE ORAL
Qty: 90 CAPSULE | Refills: 0 | Status: SHIPPED | OUTPATIENT
Start: 2025-05-19 | End: 2025-07-12

## 2025-08-17 DIAGNOSIS — M79.2 NEURALGIA AND NEURITIS, UNSPECIFIED: ICD-10-CM

## 2025-08-19 RX ORDER — NAPROXEN 500 MG/1
500 TABLET ORAL 2 TIMES DAILY WITH MEALS
Qty: 60 TABLET | Refills: 3 | Status: SHIPPED | OUTPATIENT
Start: 2025-08-19

## 2025-08-19 RX ORDER — GABAPENTIN 100 MG/1
CAPSULE ORAL
Qty: 90 CAPSULE | Refills: 0 | Status: SHIPPED | OUTPATIENT
Start: 2025-08-19 | End: 2025-10-10

## 2025-08-22 PROBLEM — E66.01 MORBID (SEVERE) OBESITY DUE TO EXCESS CALORIES (HCC): Status: ACTIVE | Noted: 2017-09-07

## 2025-08-22 PROBLEM — J45.40 MODERATE PERSISTENT ASTHMA, UNCOMPLICATED: Status: ACTIVE | Noted: 2018-02-05

## 2025-08-22 PROBLEM — M79.2 NEURALGIA AND NEURITIS, UNSPECIFIED: Status: ACTIVE | Noted: 2025-08-22

## 2025-08-25 ENCOUNTER — OFFICE VISIT (OUTPATIENT)
Facility: CLINIC | Age: 30
End: 2025-08-25
Payer: MEDICAID

## 2025-08-25 VITALS
HEART RATE: 83 BPM | BODY MASS INDEX: 46.65 KG/M2 | WEIGHT: 315 LBS | SYSTOLIC BLOOD PRESSURE: 111 MMHG | HEIGHT: 69 IN | DIASTOLIC BLOOD PRESSURE: 68 MMHG | TEMPERATURE: 98 F | OXYGEN SATURATION: 97 % | RESPIRATION RATE: 18 BRPM

## 2025-08-25 DIAGNOSIS — J45.40 MODERATE PERSISTENT ASTHMA, UNCOMPLICATED: ICD-10-CM

## 2025-08-25 DIAGNOSIS — E66.01 MORBID (SEVERE) OBESITY DUE TO EXCESS CALORIES (HCC): ICD-10-CM

## 2025-08-25 DIAGNOSIS — M79.2 NEURALGIA AND NEURITIS, UNSPECIFIED: ICD-10-CM

## 2025-08-25 DIAGNOSIS — I10 ESSENTIAL (PRIMARY) HYPERTENSION: Primary | ICD-10-CM

## 2025-08-25 DIAGNOSIS — M54.50 CHRONIC BILATERAL LOW BACK PAIN WITHOUT SCIATICA: ICD-10-CM

## 2025-08-25 DIAGNOSIS — G89.29 CHRONIC BILATERAL LOW BACK PAIN WITHOUT SCIATICA: ICD-10-CM

## 2025-08-25 DIAGNOSIS — E55.9 VITAMIN D DEFICIENCY: ICD-10-CM

## 2025-08-25 DIAGNOSIS — L21.9 SEBORRHEIC DERMATITIS: ICD-10-CM

## 2025-08-25 DIAGNOSIS — I42.9 CARDIOMYOPATHY, UNSPECIFIED TYPE (HCC): ICD-10-CM

## 2025-08-25 DIAGNOSIS — E78.5 HYPERLIPIDEMIA, UNSPECIFIED HYPERLIPIDEMIA TYPE: ICD-10-CM

## 2025-08-25 PROCEDURE — 3074F SYST BP LT 130 MM HG: CPT | Performed by: EMERGENCY MEDICINE

## 2025-08-25 PROCEDURE — 3078F DIAST BP <80 MM HG: CPT | Performed by: EMERGENCY MEDICINE

## 2025-08-25 PROCEDURE — 99214 OFFICE O/P EST MOD 30 MIN: CPT | Performed by: EMERGENCY MEDICINE

## 2025-08-25 RX ORDER — PHENTERMINE HYDROCHLORIDE 37.5 MG/1
37.5 CAPSULE ORAL EVERY MORNING
Qty: 30 CAPSULE | Refills: 2 | Status: SHIPPED | OUTPATIENT
Start: 2025-08-25 | End: 2025-11-23

## 2025-08-25 SDOH — ECONOMIC STABILITY: FOOD INSECURITY: WITHIN THE PAST 12 MONTHS, THE FOOD YOU BOUGHT JUST DIDN'T LAST AND YOU DIDN'T HAVE MONEY TO GET MORE.: NEVER TRUE

## 2025-08-25 SDOH — ECONOMIC STABILITY: FOOD INSECURITY: WITHIN THE PAST 12 MONTHS, YOU WORRIED THAT YOUR FOOD WOULD RUN OUT BEFORE YOU GOT MONEY TO BUY MORE.: NEVER TRUE

## 2025-08-25 ASSESSMENT — PATIENT HEALTH QUESTIONNAIRE - PHQ9
SUM OF ALL RESPONSES TO PHQ QUESTIONS 1-9: 0
2. FEELING DOWN, DEPRESSED OR HOPELESS: NOT AT ALL
SUM OF ALL RESPONSES TO PHQ QUESTIONS 1-9: 0
SUM OF ALL RESPONSES TO PHQ QUESTIONS 1-9: 0
1. LITTLE INTEREST OR PLEASURE IN DOING THINGS: NOT AT ALL
SUM OF ALL RESPONSES TO PHQ QUESTIONS 1-9: 0

## 2025-08-26 ENCOUNTER — HOSPITAL ENCOUNTER (OUTPATIENT)
Facility: HOSPITAL | Age: 30
Setting detail: SPECIMEN
Discharge: HOME | End: 2025-08-29
Payer: MEDICAID

## 2025-08-26 DIAGNOSIS — I10 ESSENTIAL (PRIMARY) HYPERTENSION: ICD-10-CM

## 2025-08-26 DIAGNOSIS — E55.9 VITAMIN D DEFICIENCY: ICD-10-CM

## 2025-08-26 DIAGNOSIS — E78.5 HYPERLIPIDEMIA, UNSPECIFIED HYPERLIPIDEMIA TYPE: ICD-10-CM

## 2025-08-26 LAB
25(OH)D3 SERPL-MCNC: 24.4 NG/ML (ref 30–100)
ALBUMIN SERPL-MCNC: 3.8 G/DL (ref 3.4–5)
ALBUMIN/GLOB SERPL: 1.1 (ref 0.8–1.7)
ALP SERPL-CCNC: 90 U/L (ref 45–117)
ALT SERPL-CCNC: 42 U/L (ref 10–50)
ANION GAP SERPL CALC-SCNC: 12 MMOL/L (ref 3–18)
AST SERPL-CCNC: 35 U/L (ref 10–38)
BASOPHILS # BLD: 0.03 K/UL (ref 0–0.1)
BASOPHILS NFR BLD: 0.3 % (ref 0–2)
BILIRUB SERPL-MCNC: 0.4 MG/DL (ref 0.2–1)
BUN SERPL-MCNC: 25 MG/DL (ref 6–23)
BUN/CREAT SERPL: 24 (ref 12–20)
CALCIUM SERPL-MCNC: 9.9 MG/DL (ref 8.5–10.1)
CHLORIDE SERPL-SCNC: 105 MMOL/L (ref 98–107)
CHOLEST SERPL-MCNC: 129 MG/DL
CO2 SERPL-SCNC: 25 MMOL/L (ref 21–32)
CREAT SERPL-MCNC: 1.04 MG/DL (ref 0.6–1.3)
DIFFERENTIAL METHOD BLD: NORMAL
EOSINOPHIL # BLD: 0.07 K/UL (ref 0–0.4)
EOSINOPHIL NFR BLD: 0.8 % (ref 0–5)
ERYTHROCYTE [DISTWIDTH] IN BLOOD BY AUTOMATED COUNT: 13.2 % (ref 11.6–14.5)
GLOBULIN SER CALC-MCNC: 3.3 G/DL (ref 2–4)
GLUCOSE SERPL-MCNC: 84 MG/DL (ref 74–108)
HCT VFR BLD AUTO: 44.1 % (ref 36–48)
HDLC SERPL-MCNC: 29 MG/DL (ref 40–60)
HDLC SERPL: 4.4 (ref 0–5)
HGB BLD-MCNC: 14 G/DL (ref 13–16)
IMM GRANULOCYTES # BLD AUTO: 0.02 K/UL (ref 0–0.04)
IMM GRANULOCYTES NFR BLD AUTO: 0.2 % (ref 0–0.5)
LDLC SERPL CALC-MCNC: 82 MG/DL (ref 0–100)
LYMPHOCYTES # BLD: 2.74 K/UL (ref 0.9–3.6)
LYMPHOCYTES NFR BLD: 31.4 % (ref 21–52)
MCH RBC QN AUTO: 28.5 PG (ref 24–34)
MCHC RBC AUTO-ENTMCNC: 31.7 G/DL (ref 31–37)
MCV RBC AUTO: 89.8 FL (ref 78–100)
MONOCYTES # BLD: 0.83 K/UL (ref 0.05–1.2)
MONOCYTES NFR BLD: 9.5 % (ref 3–10)
NEUTS SEG # BLD: 5.03 K/UL (ref 1.8–8)
NEUTS SEG NFR BLD: 57.8 % (ref 40–73)
NRBC # BLD: 0 K/UL (ref 0–0.01)
NRBC BLD-RTO: 0 PER 100 WBC
PLATELET # BLD AUTO: 338 K/UL (ref 135–420)
PMV BLD AUTO: 11.7 FL (ref 9.2–11.8)
POTASSIUM SERPL-SCNC: 4.9 MMOL/L (ref 3.5–5.5)
PROT SERPL-MCNC: 7.1 G/DL (ref 6.4–8.2)
RBC # BLD AUTO: 4.91 M/UL (ref 4.35–5.65)
SODIUM SERPL-SCNC: 142 MMOL/L (ref 136–145)
TRIGL SERPL-MCNC: 90 MG/DL (ref 0–150)
VLDLC SERPL CALC-MCNC: 18 MG/DL
WBC # BLD AUTO: 8.7 K/UL (ref 4.6–13.2)

## 2025-08-26 PROCEDURE — 36415 COLL VENOUS BLD VENIPUNCTURE: CPT

## 2025-08-26 PROCEDURE — 80061 LIPID PANEL: CPT

## 2025-08-26 PROCEDURE — 85025 COMPLETE CBC W/AUTO DIFF WBC: CPT

## 2025-08-26 PROCEDURE — 82306 VITAMIN D 25 HYDROXY: CPT

## 2025-08-26 PROCEDURE — 80053 COMPREHEN METABOLIC PANEL: CPT
